# Patient Record
Sex: FEMALE | Race: BLACK OR AFRICAN AMERICAN | NOT HISPANIC OR LATINO | Employment: PART TIME | URBAN - METROPOLITAN AREA
[De-identification: names, ages, dates, MRNs, and addresses within clinical notes are randomized per-mention and may not be internally consistent; named-entity substitution may affect disease eponyms.]

---

## 2017-09-21 ENCOUNTER — APPOINTMENT (OUTPATIENT)
Dept: GENERAL RADIOLOGY | Facility: HOSPITAL | Age: 42
End: 2017-09-21

## 2017-09-21 ENCOUNTER — HOSPITAL ENCOUNTER (EMERGENCY)
Facility: HOSPITAL | Age: 42
Discharge: HOME | End: 2017-09-21
Attending: EMERGENCY MEDICINE | Admitting: EMERGENCY MEDICINE

## 2017-09-21 VITALS
OXYGEN SATURATION: 99 % | DIASTOLIC BLOOD PRESSURE: 87 MMHG | SYSTOLIC BLOOD PRESSURE: 113 MMHG | HEART RATE: 78 BPM | RESPIRATION RATE: 14 BRPM | HEIGHT: 63 IN | BODY MASS INDEX: 31.54 KG/M2 | TEMPERATURE: 98.3 F | WEIGHT: 178 LBS

## 2017-09-21 DIAGNOSIS — S80.212A KNEE ABRASION, LEFT, INITIAL ENCOUNTER: Primary | ICD-10-CM

## 2017-09-21 DIAGNOSIS — S83.512A SPRAIN OF ANTERIOR CRUCIATE LIGAMENT OF LEFT KNEE, INITIAL ENCOUNTER: ICD-10-CM

## 2017-09-21 PROCEDURE — 73560 X-RAY EXAM OF KNEE 1 OR 2: CPT

## 2017-09-21 PROCEDURE — 99283 EMERGENCY DEPT VISIT LOW MDM: CPT

## 2017-09-21 RX ORDER — HYDROCODONE BITARTRATE AND ACETAMINOPHEN 5; 325 MG/1; MG/1
1 TABLET ORAL EVERY 6 HOURS PRN
Qty: 15 TABLET | Refills: 0 | Status: SHIPPED | OUTPATIENT
Start: 2017-09-21 | End: 2017-12-13

## 2017-09-21 NOTE — DISCHARGE INSTRUCTIONS
Followup with orthopedic surgery within next week for further evaluation.     Wear knee immobilizer and use crutches as instructed.

## 2017-11-22 ENCOUNTER — HOSPITAL ENCOUNTER (EMERGENCY)
Facility: HOSPITAL | Age: 42
Discharge: HOME | End: 2017-11-22
Attending: EMERGENCY MEDICINE | Admitting: EMERGENCY MEDICINE

## 2017-11-22 VITALS
OXYGEN SATURATION: 98 % | WEIGHT: 178 LBS | SYSTOLIC BLOOD PRESSURE: 154 MMHG | TEMPERATURE: 98.5 F | BODY MASS INDEX: 30.39 KG/M2 | RESPIRATION RATE: 16 BRPM | HEIGHT: 64 IN | DIASTOLIC BLOOD PRESSURE: 67 MMHG | HEART RATE: 82 BPM

## 2017-11-22 DIAGNOSIS — M25.462 KNEE EFFUSION, LEFT: Primary | ICD-10-CM

## 2017-11-22 DIAGNOSIS — S83.92XA SPRAIN OF LEFT KNEE, UNSPECIFIED LIGAMENT, INITIAL ENCOUNTER: ICD-10-CM

## 2017-11-22 PROCEDURE — 99282 EMERGENCY DEPT VISIT SF MDM: CPT

## 2017-11-22 NOTE — ED PROVIDER NOTES
"Subjective   HPI Comments: Sully Yost is a 42 y.o.female who presents to the ED with c/o left knee pain. She reports she has developed severe left knee pain with swelling and numbness. She notes, 2 months ago, she came to [facility] for the same left knee pain and discharged with a knee brace. She states that she stopped wearing her knee brace and developed severe worsening pain, soon thereafter, which prompted her visit to the ED. She notes that her pain worsens with stretching and activity. There are no other complaints at this time.     Patient is a 42 y.o. female presenting with knee pain.   History provided by:  Patient  Knee Pain   Location:  Knee  Knee location:  L knee  Pain details:     Quality:  Aching    Radiates to:  Does not radiate    Severity:  Moderate    Onset quality:  Sudden    Timing:  Constant    Progression:  Worsening  Chronicity:  Recurrent  Dislocation: no    Foreign body present:  No foreign bodies  Tetanus status:  Unknown  Prior injury to area:  Yes  Relieved by:  None tried  Worsened by:  Activity  Ineffective treatments:  None tried  Associated symptoms: numbness and swelling        Review of Systems   Cardiovascular: Positive for leg swelling (Left knee swelling).   Musculoskeletal: Positive for arthralgias and myalgias.   Neurological: Positive for numbness.       Past Medical History:   Diagnosis Date   • Anxiety    • Asthma    • Migraine        Allergies   Allergen Reactions   • Penicillins      \"MY WHOLE FAMILY IS ALLERGIC AND I DONT WANT TO TAKE A CHANCE\"        Past Surgical History:   Procedure Laterality Date   • CYST REMOVAL         History reviewed. No pertinent family history.    Social History     Social History   • Marital status:      Spouse name: N/A   • Number of children: N/A   • Years of education: N/A     Social History Main Topics   • Smoking status: Current Every Day Smoker     Packs/day: 0.50     Types: Cigarettes   • Smokeless tobacco: Never Used   • " "Alcohol use Yes      Comment: OCCASIONALLY   • Drug use: No   • Sexual activity: Defer     Other Topics Concern   • None     Social History Narrative   • None         Objective   Physical Exam   Constitutional: She is oriented to person, place, and time. She appears well-developed and well-nourished. No distress.   HENT:   Head: Normocephalic and atraumatic.   Nose: Nose normal.   Eyes: Conjunctivae are normal. No scleral icterus.   Neck: Normal range of motion. Neck supple.   Cardiovascular: Normal rate, regular rhythm and normal heart sounds.    No murmur heard.  Pulmonary/Chest: Effort normal and breath sounds normal. No respiratory distress.   Musculoskeletal:   Medial and lateral TTP of left knee. Small to moderate effusion. No increased warmth or erythema throughout knee region.    Neurological: She is alert and oriented to person, place, and time.   Skin: Skin is warm and dry.   Psychiatric: She has a normal mood and affect. Her behavior is normal.   Nursing note and vitals reviewed.      Procedures         ED Course  ED Course     No results found for this or any previous visit (from the past 24 hour(s)).  Note: In addition to lab results from this visit, the labs listed above may include labs taken at another facility or during a different encounter within the last 24 hours. Please correlate lab times with ED admission and discharge times for further clarification of the services performed during this visit.    No orders to display     Vitals:    11/22/17 0951   BP: 154/67   BP Location: Left arm   Patient Position: Sitting   Pulse: 82   Resp: 16   Temp: 98.5 °F (36.9 °C)   TempSrc: Oral   SpO2: 98%   Weight: 178 lb (80.7 kg)   Height: 64\" (162.6 cm)     Medications - No data to display  ECG/EMG Results (last 24 hours)     ** No results found for the last 24 hours. **                          [facility]    Final diagnoses:   Knee effusion, left   Sprain of left knee, unspecified ligament, initial encounter "       Documentation assistance provided by jeff Osman.  Information recorded by the scribe was done at my direction and has been verified and validated by me.     Alexis Osman  11/22/17 5155       Bakari Aguilar MD  11/22/17 4460

## 2017-11-22 NOTE — DISCHARGE INSTRUCTIONS
Take 3 over-the-counter Ibuprofen tablets 3 times a day as needed for pain. Try to take the medication with food. If you develop upper abdominal discomfort, discontinue use.    Weight bearing as tolerated using the crutches you have at home.    Immediately return to the emergency department for any new or worsening symptoms.

## 2017-12-08 ENCOUNTER — HOSPITAL ENCOUNTER (EMERGENCY)
Facility: HOSPITAL | Age: 42
Discharge: HOME | End: 2017-12-08
Attending: EMERGENCY MEDICINE | Admitting: EMERGENCY MEDICINE

## 2017-12-08 VITALS
DIASTOLIC BLOOD PRESSURE: 78 MMHG | WEIGHT: 180 LBS | OXYGEN SATURATION: 100 % | HEART RATE: 83 BPM | TEMPERATURE: 98 F | HEIGHT: 65 IN | BODY MASS INDEX: 29.99 KG/M2 | SYSTOLIC BLOOD PRESSURE: 131 MMHG | RESPIRATION RATE: 16 BRPM

## 2017-12-08 DIAGNOSIS — M25.562 ACUTE PAIN OF LEFT KNEE: Primary | ICD-10-CM

## 2017-12-08 PROCEDURE — 99283 EMERGENCY DEPT VISIT LOW MDM: CPT

## 2017-12-08 RX ORDER — NAPROXEN 500 MG/1
500 TABLET ORAL 2 TIMES DAILY PRN
Qty: 10 TABLET | Refills: 0 | Status: SHIPPED | OUTPATIENT
Start: 2017-12-08 | End: 2017-12-13

## 2017-12-08 NOTE — ED PROVIDER NOTES
Subjective   HPI Comments: 42-year-old female reports pain in her left knee for the last 2 months.  She reports that she works as a  at a local hotel.  She reports she has been ER multiple times and received pain medication but she has ran out of those and it did not provide significant relief.  She has been referred to an orthopedic surgeon but has not followed up with the orthopedic surgeon.  She continues to bear weight on the left leg despite previous recommendation to the contrary, unfortunately this is necessary for her to do her daily activity.   Injuries.  No fever or systemic symptoms of infection.  No excessive swelling or redness to the left knee.    Patient is a 42 y.o. female presenting with knee pain.   Knee Pain   Pain details:     Quality:  Aching    Radiates to:  L leg    Severity:  Moderate    Onset quality:  Gradual    Duration:  2 months    Timing:  Constant    Progression:  Worsening  Chronicity:  Chronic  Dislocation: no    Foreign body present:  No foreign bodies  Tetanus status:  Unknown  Prior injury to area:  No  Relieved by:  Nothing  Worsened by:  Bearing weight, extension and flexion  Ineffective treatments:  Elevation, ice and rest  Associated symptoms: stiffness and swelling    Associated symptoms: no back pain, no fatigue, no fever, no muscle weakness, no neck pain and no numbness        Review of Systems   Constitutional: Negative for chills, fatigue and fever.   HENT: Negative for congestion, ear pain, postnasal drip, sinus pressure and sore throat.    Eyes: Negative for pain, redness and visual disturbance.   Respiratory: Negative for cough, chest tightness and shortness of breath.    Cardiovascular: Negative for chest pain, palpitations and leg swelling.   Gastrointestinal: Negative for abdominal pain, anal bleeding, blood in stool, diarrhea, nausea and vomiting.   Endocrine: Negative for polydipsia and polyuria.   Genitourinary: Negative for difficulty urinating,  "dysuria, frequency and urgency.   Musculoskeletal: Positive for arthralgias (left knee pain) and stiffness. Negative for back pain and neck pain.   Skin: Negative for pallor and rash.   Allergic/Immunologic: Negative for environmental allergies and immunocompromised state.   Neurological: Negative for dizziness, weakness and headaches.   Hematological: Negative for adenopathy.   Psychiatric/Behavioral: Negative for confusion, self-injury and suicidal ideas. The patient is not nervous/anxious.    All other systems reviewed and are negative.      Past Medical History:   Diagnosis Date   • Anxiety    • Asthma    • Migraine        Allergies   Allergen Reactions   • Penicillins      \"MY WHOLE FAMILY IS ALLERGIC AND I DONT WANT TO TAKE A CHANCE\"        Past Surgical History:   Procedure Laterality Date   • CYST REMOVAL         History reviewed. No pertinent family history.    Social History     Social History   • Marital status:      Spouse name: N/A   • Number of children: N/A   • Years of education: N/A     Social History Main Topics   • Smoking status: Current Every Day Smoker     Packs/day: 0.50     Types: Cigarettes   • Smokeless tobacco: Never Used   • Alcohol use Yes      Comment: OCCASIONALLY   • Drug use: No   • Sexual activity: Defer     Other Topics Concern   • None     Social History Narrative           Objective   Physical Exam   Constitutional: She is oriented to person, place, and time. She appears well-developed and well-nourished.  Non-toxic appearance. No distress.   HENT:   Head: Normocephalic and atraumatic.   Right Ear: External ear normal.   Left Ear: External ear normal.   Nose: Nose normal.   Eyes: EOM and lids are normal. Pupils are equal, round, and reactive to light.   Neck: Normal range of motion. Neck supple. No tracheal deviation present.   Cardiovascular: Normal rate, regular rhythm and normal heart sounds.  Exam reveals no gallop, no friction rub and no decreased pulses.    No " murmur heard.  Pulmonary/Chest: Effort normal and breath sounds normal. No respiratory distress. She has no decreased breath sounds. She has no wheezes. She has no rhonchi. She has no rales.   Abdominal: Soft. Normal appearance and bowel sounds are normal. There is no tenderness. There is no rebound and no guarding.   Musculoskeletal: She exhibits no deformity.        Left knee: She exhibits decreased range of motion (mild) and swelling (mild). She exhibits no deformity and no erythema. Tenderness found.        Legs:  Lymphadenopathy:     She has no cervical adenopathy.   Neurological: She is alert and oriented to person, place, and time. She has normal strength. No cranial nerve deficit or sensory deficit.   Skin: Skin is warm and dry. No rash noted. She is not diaphoretic.   Psychiatric: She has a normal mood and affect. Her speech is normal and behavior is normal. Judgment and thought content normal. Cognition and memory are normal.   Nursing note and vitals reviewed.      Procedures         ED Course  ED Course                  MDM  Number of Diagnoses or Management Options  Acute pain of left knee: new and requires workup  Diagnosis management comments: She will be advised to take naproxen twice daily.  Keep leg elevated and apply ice.    Use crutches to help aid in ambulation.    Keep follow-up with orthopedic surgeon, Dr. Lang.       Amount and/or Complexity of Data Reviewed  Review and summarize past medical records: yes  Independent visualization of images, tracings, or specimens: yes    Patient Progress  Patient progress: stable      Final diagnoses:   Acute pain of left knee            Marivel Singh MD  12/08/17 4308

## 2017-12-08 NOTE — DISCHARGE INSTRUCTIONS
Patient is advised to keep leg elevated and apply ice to help control pain.    Use crutches to help aid in ambulation.

## 2017-12-13 ENCOUNTER — OFFICE VISIT (OUTPATIENT)
Dept: ORTHOPEDIC SURGERY | Facility: CLINIC | Age: 42
End: 2017-12-13

## 2017-12-13 VITALS
DIASTOLIC BLOOD PRESSURE: 73 MMHG | HEIGHT: 65 IN | WEIGHT: 185.19 LBS | BODY MASS INDEX: 30.85 KG/M2 | HEART RATE: 70 BPM | SYSTOLIC BLOOD PRESSURE: 123 MMHG

## 2017-12-13 DIAGNOSIS — M25.562 CHRONIC PAIN OF LEFT KNEE: Primary | ICD-10-CM

## 2017-12-13 DIAGNOSIS — G89.29 CHRONIC PAIN OF LEFT KNEE: Primary | ICD-10-CM

## 2017-12-13 PROCEDURE — 99203 OFFICE O/P NEW LOW 30 MIN: CPT | Performed by: ORTHOPAEDIC SURGERY

## 2017-12-13 RX ORDER — NORETHINDRONE
KIT
Refills: 0 | COMMUNITY
Start: 2017-09-21 | End: 2019-04-15

## 2017-12-13 NOTE — PROGRESS NOTES
[facility] Orthopaedic Surgery Clinic Note    Subjective     Chief Complaint   Patient presents with   • Left Knee - Pain        HPI    Sully Yost is a 42 y.o. female. She presents today for evaluation of left knee pain.  The pain is been present now for 3-1/2 months, following no particular injury.  The pain is moderate to severe, associated with swelling, popping, grinding, stiffness and giving way.  It worsens with walking, standing and climbing stairs.  She currently works as a , and has and able to continue to work in spite of the pain and swelling.  The pain is aching in quality.  She has been seen in the emergency room a few times for this.      There is no problem list on file for this patient.    Past Medical History:   Diagnosis Date   • Anxiety    • Asthma    • Migraine       Past Surgical History:   Procedure Laterality Date   • CYST REMOVAL        Family History   Problem Relation Age of Onset   • Cancer Mother    • Diabetes Mother      Social History     Social History   • Marital status:      Spouse name: N/A   • Number of children: N/A   • Years of education: N/A     Occupational History   • Not on file.     Social History Main Topics   • Smoking status: Current Every Day Smoker     Packs/day: 0.50     Types: Cigarettes   • Smokeless tobacco: Never Used   • Alcohol use Yes      Comment: OCCASIONALLY   • Drug use: No   • Sexual activity: Defer     Other Topics Concern   • Not on file     Social History Narrative      Current Outpatient Prescriptions on File Prior to Visit   Medication Sig Dispense Refill   • [DISCONTINUED] HYDROcodone-acetaminophen (NORCO) 5-325 MG per tablet Take 1 tablet by mouth Every 6 (Six) Hours As Needed for Severe Pain  for up to 15 doses. 15 tablet 0   • [DISCONTINUED] naproxen (NAPROSYN) 500 MG tablet Take 1 tablet by mouth 2 (Two) Times a Day As Needed for Mild Pain  or Moderate Pain  for up to 5 days. 10 tablet 0     No current facility-administered  "medications on file prior to visit.       Allergies   Allergen Reactions   • Penicillins      \"MY WHOLE FAMILY IS ALLERGIC AND I DONT WANT TO TAKE A CHANCE\"         Review of Systems   Constitutional: Positive for appetite change.   Eyes: Negative.    Respiratory: Positive for apnea and shortness of breath.    Cardiovascular: Negative.    Gastrointestinal: Positive for constipation.   Endocrine: Positive for cold intolerance.   Genitourinary: Positive for menstrual problem.   Musculoskeletal: Positive for arthralgias.   Skin: Negative.    Allergic/Immunologic: Negative.    Neurological: Positive for headaches.   Hematological: Negative.    Psychiatric/Behavioral: Positive for sleep disturbance. The patient is nervous/anxious.         Objective      Physical Exam  /73  Pulse 70  Ht 165.1 cm (65\")  Wt 84 kg (185 lb 3 oz)  BMI 30.82 kg/m2    Body mass index is 30.82 kg/(m^2).    General:   Mental Status:  Alert   Appearance: Cooperative, in no acute distress   Build and Nutrition: Overweight female   Orientation: Alert and oriented to person, place and time   Posture: Normal   Gait: Antalgic    Integument:   Left knee: No skin lesions, no rash, no ecchymosis    Neurologic:   Sensation:    Left foot: Intact to light touch on the dorsal and plantar aspect   Motor:  Left lower extremity: 5/5 quadriceps, hamstrings, ankle dorsiflexors, and ankle plantar flexors  Vascular:   Left lower extremity: 2+ dorsalis pedis pulse, prompt capillary refill    Lower Extremities:   Left Knee:    Tenderness:  Medial and lateral joint line tenderness    Effusion:  2+    Swelling:  None    Crepitus:  Positive    Atrophy:  None    Range of motion:  Extension: 0°       Flexion: 100°  Instability:  No varus laxity, no valgus laxity, negative anterior drawer  Deformities:  None      Imaging/Studies  Reviewed her previous plain films, obtained here at [facility], which showed good alignment, with no obvious arthritic " changes.      Assessment and Plan     Sully was seen today for pain.    Diagnoses and all orders for this visit:    Chronic pain of left knee  -     MRI Knee Left Without Contrast; Future        I reviewed my findings with patient today.  I'm suspicious of a possible meniscal tear.  I do not believe that she has an ACL tear.  At this point, I recommended an MRI.  I will see her back after the MRI, but sooner for any problems.    Return for After Imaging Study.      Medical Decision Making    Data/Risk: independent visualization of imaging, lab tests, or EMG/NCV      Ceferino Lang MD  12/13/17  1:42 PM

## 2017-12-20 ENCOUNTER — HOSPITAL ENCOUNTER (OUTPATIENT)
Dept: MRI IMAGING | Facility: HOSPITAL | Age: 42
Discharge: HOME | End: 2017-12-20
Attending: ORTHOPAEDIC SURGERY | Admitting: ORTHOPAEDIC SURGERY

## 2017-12-20 DIAGNOSIS — G89.29 CHRONIC PAIN OF LEFT KNEE: ICD-10-CM

## 2017-12-20 DIAGNOSIS — M25.562 CHRONIC PAIN OF LEFT KNEE: ICD-10-CM

## 2017-12-20 PROCEDURE — 73721 MRI JNT OF LWR EXTRE W/O DYE: CPT

## 2017-12-22 ENCOUNTER — TELEPHONE (OUTPATIENT)
Dept: ORTHOPEDIC SURGERY | Facility: CLINIC | Age: 42
End: 2017-12-22

## 2017-12-22 NOTE — TELEPHONE ENCOUNTER
Called patient back, she was wanting to be seen earlier than 01/08/18 due to risk of losing job because of knee. I made her an appt with [facility] on 01/03/2018 since she is with [facility] on that day. I instructed patient to try to take it easy and rest the knee between now and then. She understood and will call back with further problems or questions.     Narcisa

## 2017-12-22 NOTE — TELEPHONE ENCOUNTER
PATIENT WOULD LIKE TO DISCUSS THE FLUID ON HER KNEE. HAD A MRI DONE AND APPOINTMENT TO COME BACK IS NOT UNTIL 1/8 BUT PATIENT IS HAVING INCREASED DIFFICULTIES.

## 2018-01-03 ENCOUNTER — OFFICE VISIT (OUTPATIENT)
Dept: ORTHOPEDIC SURGERY | Facility: CLINIC | Age: 43
End: 2018-01-03

## 2018-01-03 VITALS
BODY MASS INDEX: 30.49 KG/M2 | HEIGHT: 65 IN | WEIGHT: 182.98 LBS | SYSTOLIC BLOOD PRESSURE: 135 MMHG | DIASTOLIC BLOOD PRESSURE: 66 MMHG | HEART RATE: 82 BPM

## 2018-01-03 DIAGNOSIS — M17.11 PRIMARY OSTEOARTHRITIS OF RIGHT KNEE: Primary | ICD-10-CM

## 2018-01-03 PROCEDURE — 20610 DRAIN/INJ JOINT/BURSA W/O US: CPT | Performed by: PHYSICIAN ASSISTANT

## 2018-01-03 PROCEDURE — 99214 OFFICE O/P EST MOD 30 MIN: CPT | Performed by: PHYSICIAN ASSISTANT

## 2018-01-03 RX ORDER — LIDOCAINE HYDROCHLORIDE 10 MG/ML
4 INJECTION, SOLUTION INFILTRATION; PERINEURAL
Status: COMPLETED | OUTPATIENT
Start: 2018-01-03 | End: 2018-01-03

## 2018-01-03 RX ORDER — METHYLPREDNISOLONE ACETATE 40 MG/ML
40 INJECTION, SUSPENSION INTRA-ARTICULAR; INTRALESIONAL; INTRAMUSCULAR; SOFT TISSUE
Status: COMPLETED | OUTPATIENT
Start: 2018-01-03 | End: 2018-01-03

## 2018-01-03 RX ADMIN — LIDOCAINE HYDROCHLORIDE 4 ML: 10 INJECTION, SOLUTION INFILTRATION; PERINEURAL at 15:26

## 2018-01-03 RX ADMIN — METHYLPREDNISOLONE ACETATE 40 MG: 40 INJECTION, SUSPENSION INTRA-ARTICULAR; INTRALESIONAL; INTRAMUSCULAR; SOFT TISSUE at 15:26

## 2018-01-03 NOTE — PROGRESS NOTES
Procedure   Large Joint Arthrocentesis  Date/Time: 1/3/2018 3:26 PM  Consent given by: patient  Site marked: site marked  Timeout: Immediately prior to procedure a time out was called to verify the correct patient, procedure, equipment, support staff and site/side marked as required   Supporting Documentation  Indications: pain   Procedure Details  Location: knee - L knee  Preparation: Patient was prepped and draped in the usual sterile fashion  Needle size: 25 G  Approach: anterolateral  Medications administered: 4 mL lidocaine 1 %; 40 mg methylPREDNISolone acetate 40 MG/ML (Bupicavaine 0.25%, NDC: 55150-167-10, LOT: [id number], EXP: 05/2019, [facility])  Aspirate amount: 13 mL  Aspirate: yellow  Patient tolerance: patient tolerated the procedure well with no immediate complications

## 2018-01-03 NOTE — PROGRESS NOTES
"        [facility] Orthopaedic Surgery Clinic Note    Subjective     Patient: Sully Yost  : 1975    Primary Care Provider: No Known Provider    Requesting Provider: As above    Follow-up of the Left Knee      History    Chief Complaint: Follow-up left knee MRI    History of Present Illness: Patient returns today for follow-up of her left knee pain and MRI 17.  Patient reports no new symptoms in the left knee.  She complains of persisting left medial and lateral pain with weightbearing and walking, worse with stairs.  She denies any radiating pain or mechanical symptoms.  She does complain of some popping in the anterior knee.  She reports the knee has been swelling on and off.  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  She is ready to get back to work and would like to return to work with no restrictions.  She is not had any previous injection, physical therapy or anti-inflammatories.  No injury.    Current Outpatient Prescriptions on File Prior to Visit   Medication Sig Dispense Refill   • NORLYDA 0.35 MG tablet TK 1 T PO QD  0     No current facility-administered medications on file prior to visit.       Allergies   Allergen Reactions   • Penicillins      \"MY WHOLE FAMILY IS ALLERGIC AND I DONT WANT TO TAKE A CHANCE\"       Past Medical History:   Diagnosis Date   • Anxiety    • Asthma    • Migraine      Past Surgical History:   Procedure Laterality Date   • CYST REMOVAL       Family History   Problem Relation Age of Onset   • Cancer Mother    • Diabetes Mother       Social History     Social History   • Marital status:      Spouse name: N/A   • Number of children: N/A   • Years of education: N/A     Occupational History   • Not on file.     Social History Main Topics   • Smoking status: Current Every Day Smoker     Packs/day: 0.50     Types: Cigarettes   • Smokeless tobacco: Never Used   • Alcohol use Yes      Comment: OCCASIONALLY   • Drug " "use: No   • Sexual activity: Defer     Other Topics Concern   • Not on file     Social History Narrative        Review of Systems   Constitutional: Negative.    HENT: Negative.    Eyes: Negative.    Respiratory: Negative.    Cardiovascular: Negative.    Gastrointestinal: Negative.    Endocrine: Negative.    Genitourinary: Negative.    Musculoskeletal: Positive for arthralgias.   Skin: Negative.    Allergic/Immunologic: Negative.    Neurological: Negative.    Hematological: Negative.    Psychiatric/Behavioral: Negative.        The following portions of the patient's history were reviewed and updated as appropriate: allergies, current medications, past family history, past medical history, past social history, past surgical history and problem list.      Objective      Physical Exam  /66  Pulse 82  Ht 165.1 cm (65\")  Wt 83 kg (182 lb 15.7 oz)  BMI 30.45 kg/m2    Body mass index is 30.45 kg/(m^2).    GENERAL: Body habitus: obese    Lower extremity edema: Left: none; Right: none    Varicose veins:  Left: none; Right: none    Gait: antalgic     Mental Status:  awake and alert; oriented to person, place, and time    Voice:  clear  SKIN:  Normal    Hair Growth:  Right:normal; Left:  normal  HEENT: Head: Normocephalic, atraumatic,  without obvious abnormality.  eye: normal external eye, no icterus   PULM:  Repiratory effort normal    Ortho Exam  Left Knee Exam  ----------  Knee Exam:  ----------  ALIGNMENT:  Left: neutral  ----------  RANGE OF MOTION:  Left: Normal (0-130 degrees) with no extensor lag or flexion contracture  LIGAMENTOUS STABILITY:   Left:stable to varus and valgus stress at terminal extension and 30 degrees without any evidence of laxity   ----------  STRENGTH:  KNEE FLEXION  Left 5/5  KNEE EXTENSION Left 5/5  ----------  PAIN WITH PALPATION: Left medial joint line and anterior knee  PAIN WITH KNEE ROM:  Left no  PATELLAR CREPITUS:  Left yes  ----------  SENSATION TO LIGHT TOUCH:  DEEP " PERONEAL/SUPERFICIAL PERONEAL/SURAL/SAPHENOUS/TIBIAL:   Left intact  ----------  EDEMA:   Left:  no  ERYTHEMA:  ; Left:  no  WOUNDS/INCISIONS: none, no overlying skin problems.      Medical Decision Making    Data Review:   reviewed radiology images  MRI 12/20/17 left knee shows bone edema in medial femoral condyle, medial tibial plateau with cartilage loss int he medial compartment.  Patella chondromalacia, intact medial and lateral menisci. intact ACL, PCL    Assessment:  1. Primary osteoarthritis of right knee        Plan:  Right knee arthritis.  I reviewed MRI findings, clinical findings past and current treatment with the patient.  On exam, she has mainly anterior medial joint line pain with some patellofemoral joint line pain and no evidence of ligament or meniscal pathology.  I explained that the MRI shows changes consistent with arthritis with loss of cartilage and bone edema indicating overload.  I reassured her that there is no evidence of meniscus tear or ligament pathology on MRI.  We discussed treatment options including good shoe wear, icing the knee, anti-inflammatories, aspiration and steroid injection.  I also discussed the possibility of Visco supplementation in the future  I discussed with her that long term, she may likely need knee replacement.  Mentation today is aspiration followed by steroid injection of the right knee.  I will give her a note to return to work and encouraged her to wear good shoes while working.  She will return in 6 weeks to see how she has progressed and possibly consider physical therapy and/or visco.      Georgette Erwin PA-C  01/03/18  4:13 PM

## 2018-02-21 ENCOUNTER — OFFICE VISIT (OUTPATIENT)
Dept: ORTHOPEDIC SURGERY | Facility: CLINIC | Age: 43
End: 2018-02-21

## 2018-02-21 DIAGNOSIS — M17.12 PRIMARY OSTEOARTHRITIS OF LEFT KNEE: Primary | ICD-10-CM

## 2018-02-21 PROBLEM — M17.11 PRIMARY OSTEOARTHRITIS OF RIGHT KNEE: Status: RESOLVED | Noted: 2018-01-03 | Resolved: 2018-02-21

## 2018-02-21 PROCEDURE — 99212 OFFICE O/P EST SF 10 MIN: CPT | Performed by: PHYSICIAN ASSISTANT

## 2018-02-21 NOTE — PROGRESS NOTES
"        [facility]    Subjective     Patient: Sully Yost  : 1975    Primary Care Provider: No Known Provider    Requesting Provider: As above    Follow-up of the Left Knee (6 week f/u/)      History    Chief Complaint: Follow-up left knee pain    History of Present Illness: Patient returns today for routine follow-up of her left knee pain.  She has known left knee arthritis and aspiration and injection of steroid proximal to 6 weeks ago.  Patient reports that the injection gave her 100% improved pain until about a week ago.  The pain has slowly returned.  It is the same pain with no new symptoms.  She is not taking any anti-inflammatories.  She continues to work through the pain.  She has read up about Euflexxa and like to try a series of Visco she is a good candidate.  She was hoping that she could have repeat steroid injection today.    Current Outpatient Prescriptions on File Prior to Visit   Medication Sig Dispense Refill   • NORLYDA 0.35 MG tablet TK 1 T PO QD  0     No current facility-administered medications on file prior to visit.       Allergies   Allergen Reactions   • Penicillins      \"MY WHOLE FAMILY IS ALLERGIC AND I DONT WANT TO TAKE A CHANCE\"       Past Medical History:   Diagnosis Date   • Anxiety    • Asthma    • Migraine      Past Surgical History:   Procedure Laterality Date   • CYST REMOVAL       Family History   Problem Relation Age of Onset   • Cancer Mother    • Diabetes Mother    • No Known Problems Father       Social History     Social History   • Marital status:      Spouse name: N/A   • Number of children: N/A   • Years of education: N/A     Occupational History   • Not on file.     Social History Main Topics   • Smoking status: Current Every Day Smoker     Packs/day: 0.50     Types: Cigarettes   • Smokeless tobacco: Never Used   • Alcohol use Yes      Comment: OCCASIONALLY   • Drug use: No   • Sexual activity: Defer     Other Topics " Concern   • Not on file     Social History Narrative        Review of Systems    The following portions of the patient's history were reviewed and updated as appropriate: allergies, current medications, past family history, past medical history, past social history, past surgical history and problem list.      Objective      Physical Exam  There were no vitals taken for this visit.    There is no height or weight on file to calculate BMI.      Ortho Exam    Left Knee Exam  ----------  Knee Exam:  ----------  ALIGNMENT:  Left: neutral  ----------  RANGE OF MOTION:  Left: Normal (0-130 degrees) with no extensor lag or flexion contracture  LIGAMENTOUS STABILITY:   Left:stable to varus and valgus stress at terminal extension and 30 degrees without any evidence of laxity   ----------  STRENGTH:  KNEE FLEXION  Left 5/5  KNEE EXTENSION Left 5/5  ----------  PAIN WITH PALPATION: Left medial joint line  PAIN WITH KNEE ROM:  Left no  PATELLAR CREPITUS:  Left yes  ----------  SENSATION TO LIGHT TOUCH:  DEEP PERONEAL/SUPERFICIAL PERONEAL/SURAL/SAPHENOUS/TIBIAL:   Left intact  ----------  EDEMA:   Left:  no  ERYTHEMA:  ; Left:  no  WOUNDS/INCISIONS: none, no overlying skin problems.      Medical Decision Making    Data Review:   ordered and reviewed x-rays today    Assessment:  1. Primary osteoarthritis of left knee        Plan:  Left knee arthritis.  I reviewed clinical findings past and current treatment with the patient.  Patient reports steroid injection 6 weeks ago gave her excellent relief for about 5 weeks.  She is now having returning symptoms, the same as before.  We discussed possible Visco supplementation in the past.  She has read the literature about it and I explained to her that the goal is to give her longer term relief than the steroid injection.  I also discussed with her that we can only do steroid injection every 4 months and it is too early to do repeat injection today.  Patient would like to try a series of  Visco.  I will get the injections preauthorized for the left knee.  She will return to begin the series once it is approved or sooner if needed.    Georgette Erwin PA-C  02/21/18  3:04 PM

## 2018-03-08 ENCOUNTER — CLINICAL SUPPORT (OUTPATIENT)
Dept: ORTHOPEDIC SURGERY | Facility: CLINIC | Age: 43
End: 2018-03-08

## 2018-03-08 DIAGNOSIS — M17.12 PRIMARY OSTEOARTHRITIS OF LEFT KNEE: Primary | ICD-10-CM

## 2018-03-08 PROCEDURE — 20610 DRAIN/INJ JOINT/BURSA W/O US: CPT | Performed by: PHYSICIAN ASSISTANT

## 2018-03-08 RX ORDER — HYALURONATE SODIUM 10 MG/ML
20 SYRINGE (ML) INTRAARTICULAR
Status: COMPLETED | OUTPATIENT
Start: 2018-03-08 | End: 2018-03-08

## 2018-03-08 RX ADMIN — Medication 20 MG: at 10:32

## 2018-03-08 NOTE — PROGRESS NOTES
"Subjective     Injections (Left knee Euflexxa Injection #1)      Sully Yost is a 43 y.o. female.     History of Present Illness   Patient presents for the first injection of Euflexxa in the left knee.  She has never had flex in the past.  She reports no new symptoms in the left knee.  Allergies   Allergen Reactions   • Penicillins      \"MY WHOLE FAMILY IS ALLERGIC AND I DONT WANT TO TAKE A CHANCE\"      Current Outpatient Prescriptions on File Prior to Visit   Medication Sig Dispense Refill   • diclofenac (VOLTAREN) 50 MG EC tablet Take 1 tablet by mouth 2 (Two) Times a Day As Needed (knee pain). 60 tablet 1   • NORLYDA 0.35 MG tablet TK 1 T PO QD  0     No current facility-administered medications on file prior to visit.      Social History     Social History   • Marital status:      Spouse name: N/A   • Number of children: N/A   • Years of education: N/A     Occupational History   • Not on file.     Social History Main Topics   • Smoking status: Current Every Day Smoker     Packs/day: 0.50     Types: Cigarettes   • Smokeless tobacco: Never Used   • Alcohol use Yes      Comment: OCCASIONALLY   • Drug use: No   • Sexual activity: Defer     Other Topics Concern   • Not on file     Social History Narrative     Past Surgical History:   Procedure Laterality Date   • CYST REMOVAL       Family History   Problem Relation Age of Onset   • Cancer Mother    • Diabetes Mother    • No Known Problems Father      Past Medical History:   Diagnosis Date   • Anxiety    • Asthma    • Migraine          Review of Systems    The following portions of the patient's history were reviewed and updated as appropriate: allergies, current medications, past family history, past medical history, past social history, past surgical history and problem list.    Ortho Exam      Medical Decision Making    Assessment and Plan/ Diagnosis/Treatment options:   Left knee arthritis here to begin a series of Euflexxa.  Plan is begin a series a " left knee today.  She'll return in 1 week for the second injection or sooner if needed.    Using sterile technique, the left knee was sterilely prepped with Hibiclens.  Following time out, using a 22 gauge needle the left knee was aspirated and then injected with 2 ml Euflexxa. Approximately 0.5 mm of straw-colored fluid was obtained.  Patient tolerated the procedure well.  No complications.

## 2018-03-08 NOTE — PROGRESS NOTES
Procedure   Large Joint Arthrocentesis  Date/Time: 3/8/2018 10:32 AM  Consent given by: patient  Site marked: site marked  Timeout: Immediately prior to procedure a time out was called to verify the correct patient, procedure, equipment, support staff and site/side marked as required   Supporting Documentation  Indications: pain   Procedure Details  Location: knee - L knee  Preparation: Patient was prepped and draped in the usual sterile fashion  Needle size: 22 G  Approach: superior  Medications administered: 20 mg Sodium Hyaluronate 20 MG/2ML  Aspirate: clear (to verify intraarticular placement of the needle)  Patient tolerance: patient tolerated the procedure well with no immediate complications

## 2018-03-15 ENCOUNTER — OFFICE VISIT (OUTPATIENT)
Dept: ORTHOPEDIC SURGERY | Facility: CLINIC | Age: 43
End: 2018-03-15

## 2018-03-15 DIAGNOSIS — M17.12 PRIMARY OSTEOARTHRITIS OF LEFT KNEE: Primary | ICD-10-CM

## 2018-03-15 PROCEDURE — 20610 DRAIN/INJ JOINT/BURSA W/O US: CPT | Performed by: PHYSICIAN ASSISTANT

## 2018-03-15 RX ORDER — HYALURONATE SODIUM 10 MG/ML
20 SYRINGE (ML) INTRAARTICULAR
Status: COMPLETED | OUTPATIENT
Start: 2018-03-15 | End: 2018-03-15

## 2018-03-15 RX ADMIN — Medication 20 MG: at 13:27

## 2018-03-15 NOTE — PROGRESS NOTES
"Subjective     Follow-up of the Left Knee (1 week follow up: Euflexxa Injection #2)      Sully Yost is a 43 y.o. female.     History of Present Illness   Patient comes in for the second injection of Euflexxa in her left knee.  She is tolerated previous injections well.  She reports no change in her knee pain since beginning the series.  Allergies   Allergen Reactions   • Penicillins      \"MY WHOLE FAMILY IS ALLERGIC AND I DONT WANT TO TAKE A CHANCE\"      Current Outpatient Prescriptions on File Prior to Visit   Medication Sig Dispense Refill   • diclofenac (VOLTAREN) 50 MG EC tablet Take 1 tablet by mouth 2 (Two) Times a Day As Needed (knee pain). 60 tablet 1   • NORLYDA 0.35 MG tablet TK 1 T PO QD  0     No current facility-administered medications on file prior to visit.      Social History     Social History   • Marital status:      Spouse name: N/A   • Number of children: N/A   • Years of education: N/A     Occupational History   • Not on file.     Social History Main Topics   • Smoking status: Current Every Day Smoker     Packs/day: 0.50     Types: Cigarettes   • Smokeless tobacco: Never Used   • Alcohol use Yes      Comment: OCCASIONALLY   • Drug use: No   • Sexual activity: Defer     Other Topics Concern   • Not on file     Social History Narrative   • No narrative on file     Past Surgical History:   Procedure Laterality Date   • CYST REMOVAL       Family History   Problem Relation Age of Onset   • Cancer Mother    • Diabetes Mother    • No Known Problems Father      Past Medical History:   Diagnosis Date   • Anxiety    • Asthma    • Migraine          Review of Systems    The following portions of the patient's history were reviewed and updated as appropriate: allergies, current medications, past family history, past medical history, past social history, past surgical history and problem list.    Ortho Exam      Medical Decision Making    Assessment and Plan/ Diagnosis/Treatment options: "   Left knee arthritis.  Plan is to proceed with a second injection left knee.  She'll return in 1 week for the final injection or sooner if needed.    Using sterile technique, the left knee was sterilely prepped with Hibiclens.  Following time out, using a 22 gauge needle the left knee was aspirated and then injected with 2 ml Euflexxa. Approximately 0.5 mm of straw-colored fluid was obtained.  Patient tolerated the procedure well.  No complications.

## 2018-03-15 NOTE — PROGRESS NOTES
Procedure   Large Joint Arthrocentesis  Date/Time: 3/15/2018 1:27 PM  Consent given by: patient  Site marked: site marked  Timeout: Immediately prior to procedure a time out was called to verify the correct patient, procedure, equipment, support staff and site/side marked as required   Supporting Documentation  Indications: pain   Procedure Details  Location: knee - L knee  Needle size: 22 G  Medications administered: 20 mg Sodium Hyaluronate 20 MG/2ML

## 2018-03-22 ENCOUNTER — OFFICE VISIT (OUTPATIENT)
Dept: ORTHOPEDIC SURGERY | Facility: CLINIC | Age: 43
End: 2018-03-22

## 2018-03-22 DIAGNOSIS — M17.12 PRIMARY OSTEOARTHRITIS OF LEFT KNEE: Primary | ICD-10-CM

## 2018-03-22 PROCEDURE — 20610 DRAIN/INJ JOINT/BURSA W/O US: CPT | Performed by: PHYSICIAN ASSISTANT

## 2018-03-22 RX ORDER — HYALURONATE SODIUM 10 MG/ML
20 SYRINGE (ML) INTRAARTICULAR
Status: COMPLETED | OUTPATIENT
Start: 2018-03-22 | End: 2018-03-22

## 2018-03-22 RX ADMIN — Medication 20 MG: at 14:17

## 2018-03-22 NOTE — PROGRESS NOTES
"Subjective     No chief complaint on file.      Sully Yost is a 43 y.o. female.     History of Present Illness   Patient returns for the final injection of Euflexxa in her left knee.  She reports no change in her knee pain.  She is tolerated previous injections well.  Allergies   Allergen Reactions   • Penicillins      \"MY WHOLE FAMILY IS ALLERGIC AND I DONT WANT TO TAKE A CHANCE\"      Current Outpatient Prescriptions on File Prior to Visit   Medication Sig Dispense Refill   • diclofenac (VOLTAREN) 50 MG EC tablet Take 1 tablet by mouth 2 (Two) Times a Day As Needed (knee pain). 60 tablet 1   • NORLYDA 0.35 MG tablet TK 1 T PO QD  0     No current facility-administered medications on file prior to visit.      Social History     Social History   • Marital status:      Spouse name: N/A   • Number of children: N/A   • Years of education: N/A     Occupational History   • Not on file.     Social History Main Topics   • Smoking status: Current Every Day Smoker     Packs/day: 0.50     Types: Cigarettes   • Smokeless tobacco: Never Used   • Alcohol use Yes      Comment: OCCASIONALLY   • Drug use: No   • Sexual activity: Defer     Other Topics Concern   • Not on file     Social History Narrative   • No narrative on file     Past Surgical History:   Procedure Laterality Date   • CYST REMOVAL       Family History   Problem Relation Age of Onset   • Cancer Mother    • Diabetes Mother    • No Known Problems Father      Past Medical History:   Diagnosis Date   • Anxiety    • Asthma    • Migraine          Review of Systems   Constitutional: Negative.    HENT: Negative.    Eyes: Negative.    Respiratory: Negative.    Cardiovascular: Negative.    Gastrointestinal: Negative.    Endocrine: Negative.    Genitourinary: Negative.    Musculoskeletal: Positive for arthralgias.   Skin: Negative.    Allergic/Immunologic: Negative.    Neurological: Negative.    Hematological: Negative.    Psychiatric/Behavioral: Negative.  "       The following portions of the patient's history were reviewed and updated as appropriate: allergies, current medications, past family history, past medical history, past social history, past surgical history and problem list.    Ortho Exam      Medical Decision Making    Assessment and Plan/ Diagnosis/Treatment options:   Left knee arthritis undergoing Euflexxa series.  Plan is to finish series in the left knee today.  I read minded her that it can take 6 weeks following the last injection to get optimal relief.  She'll return in 6-8 weeks to see how she has progressed or sooner if needed.    Using sterile technique, the left knee was sterilely prepped with Hibiclens.  Following time out, using a 22 gauge needle the left knee was aspirated and then injected with 2 ml Euflexxa. Approximately 0.5 mm of straw-colored fluid was obtained.  Patient tolerated the procedure well.  No complications.

## 2018-03-22 NOTE — PROGRESS NOTES
Procedure   Large Joint Arthrocentesis  Date/Time: 3/22/2018 2:17 PM  Consent given by: patient  Site marked: site marked  Timeout: Immediately prior to procedure a time out was called to verify the correct patient, procedure, equipment, support staff and site/side marked as required   Supporting Documentation  Indications: pain   Procedure Details  Location: knee - L knee  Needle size: 22 G  Approach: anterolateral  Medications administered: 20 mg Sodium Hyaluronate 20 MG/2ML  Patient tolerance: patient tolerated the procedure well with no immediate complications

## 2018-06-29 ENCOUNTER — LAB (OUTPATIENT)
Dept: LAB | Facility: HOSPITAL | Age: 43
End: 2018-06-29

## 2018-06-29 ENCOUNTER — OFFICE VISIT (OUTPATIENT)
Dept: ORTHOPEDIC SURGERY | Facility: CLINIC | Age: 43
End: 2018-06-29

## 2018-06-29 DIAGNOSIS — M25.462 EFFUSION OF LEFT KNEE: ICD-10-CM

## 2018-06-29 DIAGNOSIS — M17.12 PRIMARY OSTEOARTHRITIS OF LEFT KNEE: ICD-10-CM

## 2018-06-29 DIAGNOSIS — G89.29 CHRONIC PAIN OF LEFT KNEE: Primary | ICD-10-CM

## 2018-06-29 DIAGNOSIS — M25.562 CHRONIC PAIN OF LEFT KNEE: Primary | ICD-10-CM

## 2018-06-29 LAB
APPEARANCE FLD: ABNORMAL
COLOR FLD: YELLOW
CRYSTALS FLD MICRO: NORMAL
LYMPHOCYTES NFR FLD MANUAL: 30 %
MONOCYTES NFR FLD: 40 %
NEUTROPHILS NFR FLD MANUAL: 30 %
RBC # FLD AUTO: <1000 /MM3
WBC # FLD: 393 /MM3

## 2018-06-29 PROCEDURE — 89060 EXAM SYNOVIAL FLUID CRYSTALS: CPT

## 2018-06-29 PROCEDURE — 87015 SPECIMEN INFECT AGNT CONCNTJ: CPT

## 2018-06-29 PROCEDURE — 87070 CULTURE OTHR SPECIMN AEROBIC: CPT

## 2018-06-29 PROCEDURE — 99213 OFFICE O/P EST LOW 20 MIN: CPT | Performed by: PHYSICIAN ASSISTANT

## 2018-06-29 PROCEDURE — 87205 SMEAR GRAM STAIN: CPT

## 2018-06-29 PROCEDURE — 89051 BODY FLUID CELL COUNT: CPT | Performed by: PHYSICIAN ASSISTANT

## 2018-06-29 PROCEDURE — 20610 DRAIN/INJ JOINT/BURSA W/O US: CPT | Performed by: PHYSICIAN ASSISTANT

## 2018-06-29 RX ORDER — TRIAMCINOLONE ACETONIDE 40 MG/ML
40 INJECTION, SUSPENSION INTRA-ARTICULAR; INTRAMUSCULAR
Status: COMPLETED | OUTPATIENT
Start: 2018-06-29 | End: 2018-06-29

## 2018-06-29 RX ORDER — LIDOCAINE HYDROCHLORIDE 10 MG/ML
4 INJECTION, SOLUTION INFILTRATION; PERINEURAL
Status: COMPLETED | OUTPATIENT
Start: 2018-06-29 | End: 2018-06-29

## 2018-06-29 RX ORDER — MELOXICAM 7.5 MG/1
TABLET ORAL
Qty: 90 TABLET | Refills: 0 | Status: SHIPPED | OUTPATIENT
Start: 2018-06-29 | End: 2019-04-15

## 2018-06-29 RX ADMIN — LIDOCAINE HYDROCHLORIDE 4 ML: 10 INJECTION, SOLUTION INFILTRATION; PERINEURAL at 09:22

## 2018-06-29 RX ADMIN — TRIAMCINOLONE ACETONIDE 40 MG: 40 INJECTION, SUSPENSION INTRA-ARTICULAR; INTRAMUSCULAR at 09:22

## 2018-06-29 NOTE — PROGRESS NOTES
Procedure   Large Joint Arthrocentesis  Date/Time: 6/29/2018 9:22 AM  Consent given by: patient  Site marked: site marked  Timeout: Immediately prior to procedure a time out was called to verify the correct patient, procedure, equipment, support staff and site/side marked as required   Supporting Documentation  Indications: pain   Procedure Details  Location: knee - L knee  Preparation: Patient was prepped and draped in the usual sterile fashion  Needle size: 18 G  Approach: anterolateral  Medications administered: 4 mL lidocaine 1 %; 40 mg triamcinolone acetonide 40 MG/ML  Aspirate amount: 25 mL  Aspirate: clear and yellow  Patient tolerance: patient tolerated the procedure well with no immediate complications

## 2018-06-29 NOTE — PROGRESS NOTES
[facility]    Subjective     CC: Follow-up of the Left Knee (3 months)      HPI    Sully Yost is a 43 y.o. female. Patient presents to orthopedic clinic for follow-up of her left knee pain.  She has a known history of osteoarthritis.  She received a aspiration and corticosteroid injection in January 2018 which provided approximately 2 months of pain relief.  She then underwent Visco supplementation injections which worked until 2 weeks after the last injection was given.  Her complaint at this time is returning pain especially with prolonged standing, walking or going up and downstairs and effusion/swelling that's causing increased pain and limited range of motion.  She's no history of gout or rheumatoid arthritis.  No reported fever, chills, night sweats or other constitutional symptoms.     Current pain scale maximum 10/10 especially when walking or when up and down stairs.  Severity the pain moderate to severe.  Quality pain sharp.  Associated symptoms swelling and giving away.  Worsens with walking, standing, sitting, stair climbing.      ROS:    Constiutional:Pt denies fever, chills, nausea, or vomiting.  MSK:as above    Objective      Past Medical History  Past Medical History:   Diagnosis Date   • Anxiety    • Asthma    • Migraine          Physical Exam  There were no vitals taken for this visit.    There is no height or weight on file to calculate BMI.    Patient is well nourished and well developed.        Ortho Exam  Integument:              Left knee: No skin lesions, no rash, no ecchymosis.  No redness, warmth.  No evidence of knee infection.     Neurologic:              Sensation:                          Left foot: Intact to light touch on the dorsal and plantar aspect              Motor:  Left lower extremity: 5/5 quadriceps, hamstrings, ankle dorsiflexors, and ankle plantar flexors  Vascular:              Left lower extremity: 2+ dorsalis pedis pulse, prompt  capillary refill     Lower Extremities:              Left Knee:                          Tenderness:    Diffuse throughout entire knee to include medial and lateral joint line tenderness                          Effusion:          2+                          Swelling:          None                          Crepitus:          Positive                          Atrophy:           None                          Range of motion:        Extension:       0°                                                              Flexion:           100° to 110° with overpressure  Instability:         No varus laxity, no valgus laxity, negative anterior drawer    Deformities:     None    Imaging/Labs/EMG Reviewed:  Imaging Results (last 24 hours)     ** No results found for the last 24 hours. **      No new imaging today.    Assessment:  1. Chronic pain of left knee    2. Primary osteoarthritis of left knee    3. Effusion of left knee        Plan:  1. Discussion was had with patient regarding exam, assessment and treatment options.  2. After discussing risks and benefits we will proceed on today with joint aspiration followed by corticosteroid injection into the left knee.  3. Fluid will be sent for evaluation to include Gram stain, cell count, crystal.    4. Patient states she has tried anti-inflammatories so today I will prescribe Mobic.  5. Ordered physical therapy to help assist with inflammation and pain control as well as range of motion, stretching and strengthening.  6. Discussed the importance of activity modification and low nonimpact activities.  7. Follow-up in 6 weeks for repeat evaluation, sooner if issues arise.  8. Questions and concerns answered.      After discussing the risks of aspiration and injection, the patient gave consent to proceed.  Her left knee was confirmed as the correct joint to be aspirated and injected with a timeout.  It was then prepped using Betadine and under sterile conditions 25 cc of clear  straw-colored fluid was aspirated.  Then corticosteroid injection was given intra-articularly with a mixture of 4 cc of 1% plain lidocaine and 1 cc of Kenalog (40 mg per mL) without any resistance through the lateral approach, patient in supine position.  Area was cleaned, hemostasis was achieved and a Band-Aid was applied over the injection site.  The patient tolerated procedure well.  I instructed the patient on signs and symptoms of infection.  They should report to the emergency department or return to clinic if any of these develop, for further evaluation and treatment.  Recommended modifying activity for the next 48 hours to include rest, ice, elevation and oral pain medication as needed.  Patient was observed ambulating normally after the aspiration and injection.      Medical Decision Making  Management Options : prescription/IM medicine and physical/occupational therapy, injection      Mindi Jordan PA-C  06/29/18  10:26 AM

## 2018-07-13 LAB
BACTERIA FLD CULT: NORMAL
GRAM STN SPEC: NORMAL
GRAM STN SPEC: NORMAL

## 2019-04-15 ENCOUNTER — HOSPITAL ENCOUNTER (EMERGENCY)
Facility: HOSPITAL | Age: 44
Discharge: HOME | End: 2019-04-15
Attending: EMERGENCY MEDICINE | Admitting: EMERGENCY MEDICINE

## 2019-04-15 ENCOUNTER — APPOINTMENT (OUTPATIENT)
Dept: GENERAL RADIOLOGY | Facility: HOSPITAL | Age: 44
End: 2019-04-15

## 2019-04-15 VITALS
OXYGEN SATURATION: 98 % | WEIGHT: 180 LBS | SYSTOLIC BLOOD PRESSURE: 120 MMHG | HEIGHT: 64 IN | HEART RATE: 70 BPM | DIASTOLIC BLOOD PRESSURE: 70 MMHG | RESPIRATION RATE: 16 BRPM | TEMPERATURE: 97.8 F | BODY MASS INDEX: 30.73 KG/M2

## 2019-04-15 DIAGNOSIS — M50.30 DDD (DEGENERATIVE DISC DISEASE), CERVICAL: ICD-10-CM

## 2019-04-15 DIAGNOSIS — M54.31 SCIATICA OF RIGHT SIDE: ICD-10-CM

## 2019-04-15 DIAGNOSIS — M51.36 DDD (DEGENERATIVE DISC DISEASE), LUMBAR: Primary | ICD-10-CM

## 2019-04-15 LAB
B-HCG UR QL: NEGATIVE
INTERNAL NEGATIVE CONTROL: NEGATIVE
INTERNAL POSITIVE CONTROL: POSITIVE
Lab: NORMAL

## 2019-04-15 PROCEDURE — 99283 EMERGENCY DEPT VISIT LOW MDM: CPT

## 2019-04-15 PROCEDURE — 25010000002 KETOROLAC TROMETHAMINE PER 15 MG: Performed by: NURSE PRACTITIONER

## 2019-04-15 PROCEDURE — 81025 URINE PREGNANCY TEST: CPT | Performed by: NURSE PRACTITIONER

## 2019-04-15 PROCEDURE — 72100 X-RAY EXAM L-S SPINE 2/3 VWS: CPT

## 2019-04-15 PROCEDURE — 72040 X-RAY EXAM NECK SPINE 2-3 VW: CPT

## 2019-04-15 PROCEDURE — 96372 THER/PROPH/DIAG INJ SC/IM: CPT

## 2019-04-15 PROCEDURE — 73502 X-RAY EXAM HIP UNI 2-3 VIEWS: CPT

## 2019-04-15 RX ORDER — CYCLOBENZAPRINE HCL 10 MG
10 TABLET ORAL ONCE
Status: COMPLETED | OUTPATIENT
Start: 2019-04-15 | End: 2019-04-15

## 2019-04-15 RX ORDER — CYCLOBENZAPRINE HCL 10 MG
10 TABLET ORAL 3 TIMES DAILY PRN
Qty: 15 TABLET | Refills: 0 | Status: SHIPPED | OUTPATIENT
Start: 2019-04-15 | End: 2022-06-30

## 2019-04-15 RX ORDER — KETOROLAC TROMETHAMINE 30 MG/ML
30 INJECTION, SOLUTION INTRAMUSCULAR; INTRAVENOUS ONCE
Status: COMPLETED | OUTPATIENT
Start: 2019-04-15 | End: 2019-04-15

## 2019-04-15 RX ORDER — IBUPROFEN 800 MG/1
800 TABLET ORAL
Qty: 15 TABLET | Refills: 0 | Status: SHIPPED | OUTPATIENT
Start: 2019-04-15 | End: 2021-03-26

## 2019-04-15 RX ADMIN — CYCLOBENZAPRINE HYDROCHLORIDE 10 MG: 10 TABLET, FILM COATED ORAL at 19:40

## 2019-04-15 RX ADMIN — KETOROLAC TROMETHAMINE 30 MG: 30 INJECTION, SOLUTION INTRAMUSCULAR at 19:41

## 2021-02-08 ENCOUNTER — TELEPHONE (OUTPATIENT)
Dept: ORTHOPEDIC SURGERY | Facility: CLINIC | Age: 46
End: 2021-02-08

## 2021-02-08 NOTE — TELEPHONE ENCOUNTER
Are you willing to refill the Meloxicam from 2018 for the patient or should she get this from PCP?    Essence

## 2021-02-08 NOTE — TELEPHONE ENCOUNTER
Caller: PATIENT    Relationship: SELF    Best call back number: [phone]    Medication needed: INFLAMMATORY MEDICATION. PATIENT STATED MS. PEGGY CORINNE ANTHONY HAD PRESCRIBED HER A INFLAMMATORY MEDICATION PRIOR AND PATIENT IS REQUESTING A REFILL OF THAT PRESCRIPTION PRIOR TO HER APPT THAT IS SCHEDULED FOR Friday, 02.12.21. PATIENT DID NOT KNOW THE NAME OF THAT PRESCRIPTION BUT STATED SHE WOULD LIKE A REFILL ON THAT ASAP.    When do you need the refill by: ASAP    What details did the patient provide when requesting the medication: PATIENT STATED THE INFLAMMATION IN HER KNEE IS SEVERE AND SHE WOULD LIKE A REFILL ON THE PRESCRIPTION THAT MS. WOODS CORINNE ANTHONY HAD PRESCRIBED HER IN THE PAST. PATIENT DID NOT KNOW THE NAME OF THE INFLAMMATION MEDICATION. PATIENT WOULD LIKE A CALL TO LET HER KNOW IF SHE IS ABLE TO GET A REFILL ON THE PRESCRIPTION OR NOT. PLEASE ADVISE.    Does the patient have less than a 3 day supply:  [x] Yes  [] No    What is the patient's preferred pharmacy:    [facility]

## 2021-02-08 NOTE — TELEPHONE ENCOUNTER
I spoke with the patient and advised that she needs to go through her PCP for the medication. She mentioned that she does not have one. I told her that since we have not seen her in the office we cannot prescribe this for her. She mentioned she would see us on Friday and hopefully get it then.    Essence

## 2021-02-08 NOTE — TELEPHONE ENCOUNTER
JUSTINM for the patient to return my call. When she calls back, have her transferred to clinic.    Essence

## 2021-02-12 ENCOUNTER — OFFICE VISIT (OUTPATIENT)
Dept: ORTHOPEDIC SURGERY | Facility: CLINIC | Age: 46
End: 2021-02-12

## 2021-02-12 VITALS — HEART RATE: 77 BPM | WEIGHT: 213 LBS | BODY MASS INDEX: 36.37 KG/M2 | HEIGHT: 64 IN | OXYGEN SATURATION: 98 %

## 2021-02-12 DIAGNOSIS — M25.462 EFFUSION OF LEFT KNEE: ICD-10-CM

## 2021-02-12 DIAGNOSIS — M17.12 PRIMARY OSTEOARTHRITIS OF LEFT KNEE: ICD-10-CM

## 2021-02-12 DIAGNOSIS — G89.29 CHRONIC PAIN OF LEFT KNEE: Primary | ICD-10-CM

## 2021-02-12 DIAGNOSIS — E66.09 CLASS 2 OBESITY DUE TO EXCESS CALORIES WITH BODY MASS INDEX (BMI) OF 36.0 TO 36.9 IN ADULT, UNSPECIFIED WHETHER SERIOUS COMORBIDITY PRESENT: ICD-10-CM

## 2021-02-12 DIAGNOSIS — M25.562 CHRONIC PAIN OF LEFT KNEE: Primary | ICD-10-CM

## 2021-02-12 PROCEDURE — 20610 DRAIN/INJ JOINT/BURSA W/O US: CPT | Performed by: PHYSICIAN ASSISTANT

## 2021-02-12 PROCEDURE — 99214 OFFICE O/P EST MOD 30 MIN: CPT | Performed by: PHYSICIAN ASSISTANT

## 2021-02-12 RX ORDER — IBUPROFEN 800 MG/1
800 TABLET ORAL 3 TIMES DAILY
Qty: 90 TABLET | Refills: 2 | Status: SHIPPED | OUTPATIENT
Start: 2021-02-12 | End: 2021-03-26 | Stop reason: ALTCHOICE

## 2021-02-12 RX ADMIN — LIDOCAINE HYDROCHLORIDE 4 ML: 10 INJECTION, SOLUTION EPIDURAL; INFILTRATION; INTRACAUDAL; PERINEURAL at 11:50

## 2021-02-12 RX ADMIN — TRIAMCINOLONE ACETONIDE 40 MG: 40 INJECTION, SUSPENSION INTRA-ARTICULAR; INTRAMUSCULAR at 11:50

## 2021-02-12 NOTE — PROGRESS NOTES
"    [facility] [place] [facility] Clinic Note        Subjective     CC: Follow-up (2 year follow up - Chronic pain of left knee )      HPI    Sully Yost is a 46 y.o. female.  Patient returns today for follow-up evaluation of her left knee.  She was treated with aspiration, corticosteroid injection in June 2018.  She reports by October 2020 she started noticing increasing pain to the left knee.    Currently she endorses a pain scale of 9/10.  Severity the pain moderate to severe.  Quality pain sharp, stabbing, throbbing.  Associated symptoms swelling, popping, grinding, stiffness and giving away.  Symptoms are worse with walking, standing, stair climbing.  No reported numbness or tingling.    Overall, patient's symptoms are worse.    Patient is nondiabetic, non-smoker.  She has a BMI 36.54.    ROS:    Constiutional:Pt denies fever, chills, nausea, or vomiting.  MSK:as above        Objective      Past Medical History  Past Medical History:   Diagnosis Date   • Anxiety    • Arthritis of back    • Asthma    • Migraine          Physical Exam  Pulse 77   Ht 162.6 cm (64.02\")   Wt 96.6 kg (213 lb)   SpO2 98%   BMI 36.54 kg/m²     Body mass index is 36.54 kg/m².    Patient is well nourished and well developed.        Ortho Exam  Integument:              Left knee: No skin lesions, no rash, no ecchymosis.  No redness, warmth.  No evidence of knee infection.     Neurologic:              Sensation:                          Left foot: Intact to light touch on the dorsal and plantar aspect              Motor:  Left lower extremity: 5/5 quadriceps, hamstrings, ankle dorsiflexors, and ankle plantar flexors  Vascular:              Left lower extremity: 2+ dorsalis pedis pulse, prompt capillary refill     Lower Extremities:              Left Knee:                          Tenderness:    Global tenderness throughout the knee with increased pain medial greater than lateral joint lines.                            Effusion: "          2+                          Swelling:          None                          Crepitus:          Positive                          Atrophy:           None                          Range of motion:        Extension:       0°                                                              Flexion:           110° with overpressure  Instability:         No varus laxity, no valgus laxity, negative anterior drawer                          Deformities:     None       Imaging/Labs/EMG Reviewed:  Ordered left knee plain films.  Imaging read by Dr. Bergman.    Knee X-Ray  Indication: Pain     Upright AP of bilateral knees. Lateral, skiers and Sunrise views of left knee     Findings: Arthritic changes with medial joint space narrowing and patella osteochondral changes  No fracture     No prior studies were available for comparison.      Assessment:  1. Chronic pain of left knee    2. Primary osteoarthritis of left knee    3. Effusion of left knee    4. Class 2 obesity due to excess calories with body mass index (BMI) of 36.0 to 36.9 in adult, unspecified whether serious comorbidity present        Plan:  1. Chronic left knee pain due to primary osteoarthritis with joint effusion.  2. Offered and accepted joint aspiration followed by injection of corticosteroid.  Both aspiration and injection were performed today.  3. Patient had been previously prescribed meloxicam but that is no longer on her insurance plan.  She was prescribed ibuprofen 800 mg today.  4. Obesity--patient has a BMI of 36.54 which needs to be watched.  Briefly discussed weight loss by dieting, portion control, calorie restriction, non to low impact exercise, referral to weight loss management and/or bariatric surgery.  It was explained that weight loss can improve joint pain alone by decreasing the joint reaction forces.  For every pound of weight change, the knee and hip joints see a 4 to 5 fold multiplier affect.  Given these options, the patient will  proceed with diet and low impact exercise.  5. Follow-up in 6 weeks for repeat evaluation, sooner if issues arise or symptoms worsen/change.  Depending on her response to the aspiration/injection today would consider Visco supplementation series as additional options for pain management.  6. Questions and concerns answered.    After discussing the risks of aspiration and injection, the patient gave consent to proceed.  Her left knee was confirmed as the correct joint to be aspirated and injected with a timeout.  It was then prepped using chlorhexidine and under sterile conditions 20 cc of clear straw-colored fluid was aspirated.  Then corticosteroid injection was given intra-articularly with a mixture of 4 cc of 1% plain lidocaine and 1 cc of Kenalog (40 mg per mL) without any resistance through the lateral approach, patient in supine position.  Area was cleaned, hemostasis was achieved and a Band-Aid was applied over the injection site.  The patient tolerated procedure well.  I instructed the patient on signs and symptoms of infection.  They should report to the emergency department or return to clinic if any of these develop, for further evaluation and treatment.  Recommended modifying activity for the next 48 hours to include rest, ice, elevation and oral pain medication as needed.  Patient was observed ambulating normally after the aspiration and injection.      Mindi Jordan PA-C  02/15/21  10:18 EST      Dragon disclaimer:  Much of this encounter note is an electronic transcription/translation of spoken language to printed text. The electronic translation of spoken language may permit erroneous, or at times, nonsensical words or phrases to be inadvertently transcribed; Although I have reviewed the note for such errors, some may still exist.

## 2021-02-12 NOTE — PROGRESS NOTES
Procedure   Large Joint Arthrocentesis: L knee  Date/Time: 2/12/2021 11:50 AM  Consent given by: patient  Site marked: site marked  Timeout: Immediately prior to procedure a time out was called to verify the correct patient, procedure, equipment, support staff and site/side marked as required   Supporting Documentation  Indications: pain   Procedure Details  Location: knee - L knee  Preparation: Patient was prepped and draped in the usual sterile fashion  Needle size: 18 G  Approach: anterolateral  Medications administered: 4 mL lidocaine PF 1% 1 %; 40 mg triamcinolone acetonide 40 MG/ML  Aspirate amount: 20 mL  Aspirate: yellow and clear  Patient tolerance: patient tolerated the procedure well with no immediate complications

## 2021-02-15 RX ORDER — LIDOCAINE HYDROCHLORIDE 10 MG/ML
4 INJECTION, SOLUTION EPIDURAL; INFILTRATION; INTRACAUDAL; PERINEURAL
Status: COMPLETED | OUTPATIENT
Start: 2021-02-12 | End: 2021-02-12

## 2021-02-15 RX ORDER — TRIAMCINOLONE ACETONIDE 40 MG/ML
40 INJECTION, SUSPENSION INTRA-ARTICULAR; INTRAMUSCULAR
Status: COMPLETED | OUTPATIENT
Start: 2021-02-12 | End: 2021-02-12

## 2021-03-26 ENCOUNTER — OFFICE VISIT (OUTPATIENT)
Dept: ORTHOPEDIC SURGERY | Facility: CLINIC | Age: 46
End: 2021-03-26

## 2021-03-26 VITALS
BODY MASS INDEX: 36.7 KG/M2 | WEIGHT: 215 LBS | DIASTOLIC BLOOD PRESSURE: 75 MMHG | HEART RATE: 82 BPM | HEIGHT: 64 IN | SYSTOLIC BLOOD PRESSURE: 158 MMHG

## 2021-03-26 DIAGNOSIS — M17.12 PRIMARY OSTEOARTHRITIS OF LEFT KNEE: Primary | ICD-10-CM

## 2021-03-26 DIAGNOSIS — G89.29 CHRONIC PAIN OF LEFT KNEE: ICD-10-CM

## 2021-03-26 DIAGNOSIS — M25.562 CHRONIC PAIN OF LEFT KNEE: ICD-10-CM

## 2021-03-26 PROCEDURE — 99213 OFFICE O/P EST LOW 20 MIN: CPT | Performed by: PHYSICIAN ASSISTANT

## 2021-03-26 RX ORDER — MELOXICAM 15 MG/1
15 TABLET ORAL DAILY
Qty: 90 TABLET | Refills: 2 | Status: SHIPPED | OUTPATIENT
Start: 2021-03-26 | End: 2021-08-09 | Stop reason: SDUPTHER

## 2021-03-26 RX ORDER — MELOXICAM 15 MG/1
15 TABLET ORAL DAILY
COMMUNITY
End: 2021-03-26 | Stop reason: SDUPTHER

## 2021-03-26 NOTE — PROGRESS NOTES
"    [facility] [place] [facility] Note        Subjective     CC: Follow-up (6 week follow up; Chronic pain of left knee-last aspiration and injection given at last visit 2/12/21)      HPI    Sully Yost is a 46 y.o. female. Patient returns following corticosteroid to left knee on 2/12/2021.  She reports symptoms improved with injection.    Pain scale: 3/10. Associated symptoms popping, swelling, grinding.  Pain worse with walking, standing (sometimes) and stairs.  No numbness or tingling into the extremity.     Overall, patient's symptoms are improved following steroid injection.    ROS:    Constiutional:Pt denies fever, chills, nausea, or vomiting.  MSK:as above        Objective      Past Medical History  Past Medical History:   Diagnosis Date   • Anxiety    • Arthritis of back    • Asthma    • Migraine          Physical Exam  /75   Pulse 82   Ht 162.6 cm (64.02\")   Wt 97.5 kg (215 lb)   BMI 36.89 kg/m²     Body mass index is 36.89 kg/m².    Patient is well nourished and well developed.        Ortho Exam  Left knee  Trace effusion noted  Diffuse tenderness but improved after injection.  0-110 with crepitus      Imaging/Labs/EMG Reviewed:  No new imaging today.       Assessment:  1. Primary osteoarthritis of left knee    2. Chronic pain of left knee        Plan:  1. Osteoarthritis left knee causing chronic pain.  2. Placed pre-authorization for visco supplementation injection(s).  3. Once approved patient will return to clinic to start series.   4. Patient requesting refill meloxicam but that is no longer on her insurance plan.  She would still like it prescribed, done.  If cost is too much then return to using ibuprofen 800 mg TID as needed.  DC medication if GI issues develop.  5. Follow-up when injections approved.  Additionally discussed possible referral to rheumatology for recurrent effusions.  6. Questions and concerns answered.      Mindi Jordan PA-C  03/30/21  09:24 " SAHARA Hummel disclaimer:  Much of this encounter note is an electronic transcription/translation of spoken language to printed text. The electronic translation of spoken language may permit erroneous, or at times, nonsensical words or phrases to be inadvertently transcribed; Although I have reviewed the note for such errors, some may still exist.

## 2021-04-16 ENCOUNTER — CLINICAL SUPPORT (OUTPATIENT)
Dept: ORTHOPEDIC SURGERY | Facility: CLINIC | Age: 46
End: 2021-04-16

## 2021-04-16 DIAGNOSIS — M17.12 PRIMARY OSTEOARTHRITIS OF LEFT KNEE: ICD-10-CM

## 2021-04-16 PROCEDURE — 20610 DRAIN/INJ JOINT/BURSA W/O US: CPT | Performed by: PHYSICIAN ASSISTANT

## 2021-04-16 NOTE — PROGRESS NOTES
CC: Follow-up left knee osteoarthritis, 1/3 Orthovisc injection today    History of present illness: Patient presents for her first Orthovisc injection to the left knee today.  At this time she denies any numbness or tingling into the distal extremity.  No fever, chills, night sweats or other constitutional symptoms.    See chart for PMH, PSH, Meds, All - reviewed.    Ortho exam:  Left knee  Skin is intact without redness, warmth.  Trace effusion.  No lesions or evidence of infection noted.  Motor/sensory: Grossly intact L2-S1.    Assessment/plan:  Left knee osteoarthritis    Proceed today with 1/3 of Orthovisc injection.  Patient will follow-up in week for second injection.      After discussing risks of injection the patient gave consent to proceed.  Her left knee was confirmed as the correct joint to be injected with a timeout.  The knee was then prepped with Hibiclens and injected with a prefilled syringe of Orthovisc without any resistance using anterior lateral approach, patient in seated position.  The patient tolerated procedure well.  Hemostasis was achieved and a Band-Aid was applied over the injection site.  I instructed the patient on signs and symptoms of infection.  They should report to the ED if any of these develop.  Recommended modifying activity to include rest, ice, elevation and/or heat along with oral pain medication as needed.  Patient observed ambulating normally after the injection.

## 2021-04-16 NOTE — PROGRESS NOTES
Procedure   Large Joint Arthrocentesis: L knee  Date/Time: 4/16/2021 9:30 AM  Consent given by: patient  Timeout: Immediately prior to procedure a time out was called to verify the correct patient, procedure, equipment, support staff and site/side marked as required   Supporting Documentation  Indications: pain   Procedure Details  Location: knee - L knee  Needle size: 23 G  Approach: anteromedial  Medications administered: 30 mg Hyaluronan 30 MG/2ML  Patient tolerance: patient tolerated the procedure well with no immediate complications

## 2021-04-23 ENCOUNTER — CLINICAL SUPPORT (OUTPATIENT)
Dept: ORTHOPEDIC SURGERY | Facility: CLINIC | Age: 46
End: 2021-04-23

## 2021-04-23 DIAGNOSIS — M17.12 PRIMARY OSTEOARTHRITIS OF LEFT KNEE: Primary | ICD-10-CM

## 2021-04-23 PROCEDURE — 20610 DRAIN/INJ JOINT/BURSA W/O US: CPT | Performed by: PHYSICIAN ASSISTANT

## 2021-04-23 NOTE — PROGRESS NOTES
Procedure   Large Joint Arthrocentesis: L knee  Date/Time: 4/23/2021 9:58 AM  Consent given by: patient  Site marked: site marked  Timeout: Immediately prior to procedure a time out was called to verify the correct patient, procedure, equipment, support staff and site/side marked as required   Supporting Documentation  Indications: pain   Procedure Details  Location: knee - L knee  Preparation: Patient was prepped and draped in the usual sterile fashion  Needle size: 23 G  Approach: anterolateral  Medications administered: 30 mg Hyaluronan 30 MG/2ML  Patient tolerance: patient tolerated the procedure well with no immediate complications

## 2021-04-23 NOTE — PROGRESS NOTES
CC: Follow-up left knee osteoarthritis, 2/3 Orthovisc injection today     History of present illness: Patient presents for her second Orthovisc injection to the left knee today.  At this time she denies any numbness or tingling into the distal extremity.  No fever, chills, night sweats or other constitutional symptoms.     See chart for PMH, PSH, Meds, All - reviewed.     Ortho exam:  Left knee  Skin is intact without redness, warmth.  Trace effusion.  No lesions or evidence of infection noted.  Motor/sensory: Grossly intact L2-S1.     Assessment/plan:  Left knee osteoarthritis     Proceed today with 2/3 of Orthovisc injection.  Patient will follow-up in week for third injection.       After discussing risks of injection the patient gave consent to proceed.  Her left knee was confirmed as the correct joint to be injected with a timeout.  The knee was then prepped with Hibiclens and injected with a prefilled syringe of Orthovisc without any resistance using anterior lateral approach, patient in seated position.  The patient tolerated procedure well.  Hemostasis was achieved and a Band-Aid was applied over the injection site.  I instructed the patient on signs and symptoms of infection.  They should report to the ED if any of these develop.  Recommended modifying activity to include rest, ice, elevation and/or heat along with oral pain medication as needed.  Patient observed ambulating normally after the injection.

## 2021-04-30 ENCOUNTER — CLINICAL SUPPORT (OUTPATIENT)
Dept: ORTHOPEDIC SURGERY | Facility: CLINIC | Age: 46
End: 2021-04-30

## 2021-04-30 DIAGNOSIS — M17.12 PRIMARY OSTEOARTHRITIS OF LEFT KNEE: Primary | ICD-10-CM

## 2021-04-30 PROCEDURE — 20610 DRAIN/INJ JOINT/BURSA W/O US: CPT | Performed by: PHYSICIAN ASSISTANT

## 2021-04-30 NOTE — PROGRESS NOTES
Procedure   Large Joint Arthrocentesis: L knee  Date/Time: 4/30/2021 10:01 AM  Consent given by: patient  Site marked: site marked  Timeout: Immediately prior to procedure a time out was called to verify the correct patient, procedure, equipment, support staff and site/side marked as required   Supporting Documentation  Indications: pain   Procedure Details  Location: knee - L knee  Preparation: Patient was prepped and draped in the usual sterile fashion  Needle size: 23 G  Approach: anterolateral  Medications administered: 30 mg Hyaluronan 30 MG/2ML  Patient tolerance: patient tolerated the procedure well with no immediate complications

## 2021-04-30 NOTE — PROGRESS NOTES
CC: Follow-up left knee osteoarthritis, 3/3 Orthovisc injection today     History of present illness: Patient presents for her third Orthovisc injection to the left knee today.  At this time she denies any numbness or tingling into the distal extremity.  No fever, chills, night sweats or other constitutional symptoms.    Patient has not noticed much change with starting the series.  She is taking prescribed NSAIDs.     See chart for PMH, PSH, Meds, All - reviewed.     Ortho exam:  Left knee  Skin is intact without redness, warmth.  Trace effusion.  No lesions or evidence of infection noted.  Motor/sensory: Grossly intact L2-S1.     Assessment/plan:  Left knee osteoarthritis     Proceed today with 3/3 of Orthovisc injection.  Patient will follow-up in 3 months.       After discussing risks of injection the patient gave consent to proceed.  Her left knee was confirmed as the correct joint to be injected with a timeout.  The knee was then prepped with Hibiclens and injected with a prefilled syringe of Orthovisc without any resistance using anterior lateral approach, patient in seated position.  The patient tolerated procedure well.  Hemostasis was achieved and a Band-Aid was applied over the injection site.  I instructed the patient on signs and symptoms of infection.  They should report to the ED if any of these develop.  Recommended modifying activity to include rest, ice, elevation and/or heat along with oral pain medication as needed.  Patient observed ambulating normally after the injection.

## 2021-08-09 ENCOUNTER — OFFICE VISIT (OUTPATIENT)
Dept: ORTHOPEDIC SURGERY | Facility: CLINIC | Age: 46
End: 2021-08-09

## 2021-08-09 VITALS
WEIGHT: 206 LBS | DIASTOLIC BLOOD PRESSURE: 81 MMHG | BODY MASS INDEX: 35.17 KG/M2 | SYSTOLIC BLOOD PRESSURE: 138 MMHG | HEIGHT: 64 IN | HEART RATE: 67 BPM

## 2021-08-09 DIAGNOSIS — E66.09 CLASS 2 OBESITY DUE TO EXCESS CALORIES WITHOUT SERIOUS COMORBIDITY WITH BODY MASS INDEX (BMI) OF 35.0 TO 35.9 IN ADULT: ICD-10-CM

## 2021-08-09 DIAGNOSIS — M17.12 PRIMARY OSTEOARTHRITIS OF LEFT KNEE: Primary | ICD-10-CM

## 2021-08-09 DIAGNOSIS — Z72.0 TOBACCO USE: ICD-10-CM

## 2021-08-09 DIAGNOSIS — M25.562 CHRONIC PAIN OF LEFT KNEE: ICD-10-CM

## 2021-08-09 DIAGNOSIS — G89.29 CHRONIC PAIN OF LEFT KNEE: ICD-10-CM

## 2021-08-09 PROCEDURE — 20610 DRAIN/INJ JOINT/BURSA W/O US: CPT | Performed by: PHYSICIAN ASSISTANT

## 2021-08-09 PROCEDURE — 99214 OFFICE O/P EST MOD 30 MIN: CPT | Performed by: PHYSICIAN ASSISTANT

## 2021-08-09 RX ORDER — MELOXICAM 15 MG/1
15 TABLET ORAL DAILY
Qty: 90 TABLET | Refills: 2 | Status: SHIPPED | OUTPATIENT
Start: 2021-08-09 | End: 2022-06-30

## 2021-08-09 RX ORDER — CEFUROXIME AXETIL 500 MG/1
500 TABLET ORAL 2 TIMES DAILY
COMMUNITY
Start: 2021-08-02 | End: 2021-11-15

## 2021-08-09 RX ADMIN — TRIAMCINOLONE ACETONIDE 40 MG: 40 INJECTION, SUSPENSION INTRA-ARTICULAR; INTRAMUSCULAR at 11:18

## 2021-08-09 RX ADMIN — LIDOCAINE HYDROCHLORIDE 4 ML: 10 INJECTION, SOLUTION EPIDURAL; INFILTRATION; INTRACAUDAL; PERINEURAL at 11:18

## 2021-08-09 NOTE — PROGRESS NOTES
"    [facility] [place] [facility] Clinic Note        Subjective     CC: Follow-up (3 month f/u--Primary osteoarthritis of left knee )      HPI    Sully Yost is a 46 y.o. female.  Patient returns today for follow-up evaluation of left knee.  She has known osteoarthritis.  She was given a viscosupplementation series in April 2021 which she said worked quite well but now she is noticing pain beginning to increase.  Additionally patient has changed her job and is no longer walking for prolonged periods of time on concrete.  She now works at a .  She is also been working on weight loss.  Patient has also been taking meloxicam which does help.    Without the meloxicam she endorses a pain scale of 8/10.  With the anti-inflammatory 5/10.  Associated symptoms swelling.  Pain worsened with walking, prolonged standing, stair climbing and rising from a seated position.    Overall, patient's symptoms are improved following viscosupplementation but slowly worsening as medication begins to wear off.    ROS:    Constiutional:Pt denies fever, chills, nausea, or vomiting.  MSK:as above        Objective      Past Medical History  Past Medical History:   Diagnosis Date   • Anxiety    • Arthritis of back    • Asthma    • Migraine          Physical Exam  /81   Pulse 67   Ht 162.6 cm (64.02\")   Wt 93.4 kg (206 lb)   BMI 35.34 kg/m²     Body mass index is 35.34 kg/m².    Patient is well nourished and well developed.        Ortho Exam  Left knee  Skin: Grossly intact without any redness, warmth.  Trace effusion noted.  Tenderness: Positive medial greater than lateral joint line.  Motion: 0-115 degrees with pain at end range of motion as well as crepitus appreciable.  Motor/sensory: Grossly intact L2-S1  Testing: Negative instability with regards to varus and valgus stress test.  Lachman's negative.      Imaging/Labs/EMG Reviewed:  No new imaging today.      Assessment:  1. Primary osteoarthritis of left knee    2. Chronic " pain of left knee    3. Class 2 obesity due to excess calories without serious comorbidity with body mass index (BMI) of 35.0 to 35.9 in adult    4. Tobacco use        Plan:  1. Left knee osteoarthritis causing chronic pain (pain slowly worsening)--offered and accepted corticosteroid injection to left knee.  Injection was given today.  2. Refilled/prescribed meloxicam as it is worked well in the past.  3. Obesity--patient with a BMI of 35.34.  Since her last appointment/way in in March she has lost 6 pounds.  Continue with diet, portion control and non to low impact exercises for continued weight loss.  4. Tobacco use--discussed the importance of smoking cessation, reviewed the adverse effects of tobacco use: increased risk of tendonitis (more difficult to treat and resolve), hardening of the arteries, gangrene, amputation, etc. Included in the counseling were treatments options for cessation: quitting cold turkey, medication, nicotine step-down programs and smoking cessation programs. Furthermore, I explained to the patient the importance of abstaining from nicotine usage as nicotine is known to increase risk of complications associated with surgery including but not limited to infection, decreased wound healing, fracture/fusion nonunion, slowed soft tissue healing, and increased surgery associated pain.  Patient understands that she will need to be tobacco free in order to proceed with surgical intervention. (5 minutes spent counseling patient).  5. Follow-up 3 months for repeat of viscosupplementation series.  She will need to contact the clinic 1 to 2 weeks prior to that appointment so preauthorization can be obtained.  6. Questions and concerns answered.    After discussing the risks, benefits, indications of injection, the patient gave consent to proceed.  Her left knee was confirmed as the correct joint to be injected with a timeout.  It was then prepped using Hibiclens and injected with a mixture of 4 cc of  1% plain lidocaine and 1 cc of Kenalog (40 mg per mL), without any resistance through the anterior lateral approach, patient in seated position.  Area was cleaned, hemostasis was achieved and a Band-Aid was applied over the injection site.  The patient tolerated procedure well.  I instructed the patient on signs and symptoms of infection.  They should report to the emergency department or return to clinic if any of these develop, for further evaluation and treatment.  Recommended modifying activity for the next 48 hours to include rest, ice, elevation and oral pain medication as needed.        Mindi Jordan PA-C  08/12/21  14:35 EDT      Dragon disclaimer:  Much of this encounter note is an electronic transcription/translation of spoken language to printed text. The electronic translation of spoken language may permit erroneous, or at times, nonsensical words or phrases to be inadvertently transcribed; Although I have reviewed the note for such errors, some may still exist.

## 2021-08-12 RX ORDER — TRIAMCINOLONE ACETONIDE 40 MG/ML
40 INJECTION, SUSPENSION INTRA-ARTICULAR; INTRAMUSCULAR
Status: COMPLETED | OUTPATIENT
Start: 2021-08-09 | End: 2021-08-09

## 2021-08-12 RX ORDER — LIDOCAINE HYDROCHLORIDE 10 MG/ML
4 INJECTION, SOLUTION EPIDURAL; INFILTRATION; INTRACAUDAL; PERINEURAL
Status: COMPLETED | OUTPATIENT
Start: 2021-08-09 | End: 2021-08-09

## 2021-11-15 ENCOUNTER — OFFICE VISIT (OUTPATIENT)
Dept: ORTHOPEDIC SURGERY | Facility: CLINIC | Age: 46
End: 2021-11-15

## 2021-11-15 VITALS
DIASTOLIC BLOOD PRESSURE: 86 MMHG | SYSTOLIC BLOOD PRESSURE: 138 MMHG | WEIGHT: 214.2 LBS | HEART RATE: 81 BPM | HEIGHT: 64 IN | BODY MASS INDEX: 36.57 KG/M2

## 2021-11-15 DIAGNOSIS — M25.462 EFFUSION OF LEFT KNEE: ICD-10-CM

## 2021-11-15 DIAGNOSIS — E66.09 CLASS 2 OBESITY DUE TO EXCESS CALORIES WITH BODY MASS INDEX (BMI) OF 36.0 TO 36.9 IN ADULT, UNSPECIFIED WHETHER SERIOUS COMORBIDITY PRESENT: ICD-10-CM

## 2021-11-15 DIAGNOSIS — G89.29 CHRONIC PAIN OF LEFT KNEE: ICD-10-CM

## 2021-11-15 DIAGNOSIS — Z72.0 TOBACCO USE: ICD-10-CM

## 2021-11-15 DIAGNOSIS — M25.562 CHRONIC PAIN OF LEFT KNEE: ICD-10-CM

## 2021-11-15 DIAGNOSIS — M17.12 PRIMARY OSTEOARTHRITIS OF LEFT KNEE: Primary | ICD-10-CM

## 2021-11-15 PROCEDURE — 99213 OFFICE O/P EST LOW 20 MIN: CPT | Performed by: PHYSICIAN ASSISTANT

## 2021-11-15 RX ORDER — ALBUTEROL SULFATE 90 UG/1
2 AEROSOL, METERED RESPIRATORY (INHALATION) EVERY 6 HOURS PRN
COMMUNITY
Start: 2022-09-16

## 2021-11-15 RX ORDER — IBUPROFEN 600 MG/1
TABLET ORAL
COMMUNITY
Start: 2021-09-26 | End: 2022-06-30

## 2021-11-15 NOTE — PROGRESS NOTES
"    [facility] [place] [facility] Clinic Note        Subjective     CC: Follow-up (3 month recheck - Primary osteoarthritis of left knee )      HPI    Sully Yost is a 46 y.o. female.  Patient returns today for follow-up left knee.  She was last seen in August 2021 and provided corticosteroid injection.  Upon return today she notes increased pain as well as return of swelling to the knee.  Last viscosupplementation series was April 2021.  She is interested in trying the viscosupplementation series again.    Currently she endorses a pain scale of 8/10.  Associated symptoms swelling.  Pain is worse with walking, stair climbing.  Sitting helps.  No reported numbness or tingling.  No mechanical symptoms such as locking or catching to the knee.    She still has her meloxicam that I prescribed her back in August but she does not take it on a routine basis.  She began taking it again.    Overall, patient's symptoms are worsening as prior injections have worn off and swelling has returned.    ROS:    Constiutional:Pt denies fever, chills, nausea, or vomiting.  MSK:as above        Objective      Past Medical History  Past Medical History:   Diagnosis Date   • Anxiety    • Arthritis of back    • Asthma    • Migraine          Physical Exam  /86   Pulse 81   Ht 162.6 cm (64.02\")   Wt 97.2 kg (214 lb 3.2 oz)   BMI 36.75 kg/m²     Body mass index is 36.75 kg/m².    Patient is well nourished and well developed.        Ortho Exam  Left knee  Skin: Grossly intact without any redness or warmth.  1+ effusion noted.  Tenderness: Diffuse tenderness throughout the knee with increased pain medial greater than lateral joint line.  Motion: 0-115 degrees with crepitus and pain noted throughout.  Motor/sensory: Grossly intact L2-S1.      Imaging/Labs/EMG Reviewed:  No new imaging today.      Assessment:  1. Primary osteoarthritis of left knee    2. Chronic pain of left knee    3. Effusion of left knee    4. Class 2 obesity due to " excess calories with body mass index (BMI) of 36.0 to 36.9 in adult, unspecified whether serious comorbidity present    5. Tobacco use        Plan:  1. Left knee osteoarthritis and joint effusion causing chronic pain.  2. Patient would like to try viscosupplementation series.  Preauthorization was placed.  If denied then we will proceed with corticosteroid injection.  3. She needs to return to using her meloxicam for inflammation/pain control.  4. Obesity--patient has had no change in her weight.  She continues to be around BMI 36.  She is to continue working with diet and portion control as well as non to low impact exercises.  5. Tobacco use--patient has continued to smoke but is working on cutting down.  She reports less than half pack a day smoker.  6. Follow-up when injections are available to be started.  7. Questions and concerns answered.      Mindi Jordan PA-C  11/19/21  09:28 EST      Dictated Utilizing Dragon Dictation.

## 2021-12-07 ENCOUNTER — TELEPHONE (OUTPATIENT)
Dept: ORTHOPEDIC SURGERY | Facility: CLINIC | Age: 46
End: 2021-12-07

## 2021-12-20 ENCOUNTER — CLINICAL SUPPORT (OUTPATIENT)
Dept: ORTHOPEDIC SURGERY | Facility: CLINIC | Age: 46
End: 2021-12-20

## 2021-12-20 DIAGNOSIS — M17.12 PRIMARY OSTEOARTHRITIS OF LEFT KNEE: ICD-10-CM

## 2021-12-20 PROCEDURE — 20610 DRAIN/INJ JOINT/BURSA W/O US: CPT | Performed by: PHYSICIAN ASSISTANT

## 2021-12-20 NOTE — PROGRESS NOTES

## 2021-12-20 NOTE — PROGRESS NOTES
CC: Follow-up left knee osteoarthritis, 1/3 Orthovisc injection today    History of present illness: Patient presents for her first Orthovisc injection to the left knee today.  At this time she denies any numbness or tingling into the distal extremity.  No fever, chills, night sweats or other constitutional symptoms.    Patient reports she is changed jobs, no longer working at .  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    See chart for PMH, PSH, Meds, All - reviewed.    Ortho exam:  Left knee  Skin is intact without redness, warmth.  Only trace effusion noted not as significant as it was from her last appointment.  No lesions or evidence of infection noted.  Motor/sensory: Grossly intact L2-S1.    Assessment/plan:  Left knee osteoarthritis    Proceed today with 1/3 of Orthovisc injection.  Patient will follow-up in week for second injection.      After discussing risks of injection the patient gave consent to proceed.  Left knee was confirmed as the correct joint to be injected with a timeout.  The knee was then prepped with Hibiclens and injected with a prefilled syringe of Orthovisc without any resistance using anterior lateral approach, patient in seated position.  The patient tolerated procedure well.  Hemostasis was achieved and a Band-Aid was applied over the injection site.  I instructed the patient on signs and symptoms of infection.  They should report to the ED if any of these develop.  Recommended modifying activity to include rest, ice, elevation and/or heat along with oral pain medication as needed.

## 2021-12-27 ENCOUNTER — CLINICAL SUPPORT (OUTPATIENT)
Dept: ORTHOPEDIC SURGERY | Facility: CLINIC | Age: 46
End: 2021-12-27

## 2021-12-27 DIAGNOSIS — M17.12 PRIMARY OSTEOARTHRITIS OF LEFT KNEE: Primary | ICD-10-CM

## 2021-12-27 PROCEDURE — 20610 DRAIN/INJ JOINT/BURSA W/O US: CPT | Performed by: PHYSICIAN ASSISTANT

## 2021-12-27 NOTE — PROGRESS NOTES
CC: Follow-up left knee osteoarthritis, 2/3 Orthovisc injection today     History of present illness: Patient presents for her second Orthovisc injection to the left knee today.  At this time she denies any numbness or tingling into the distal extremity.  No fever, chills, night sweats or other constitutional symptoms.     See chart for PMH, PSH, Meds, All - reviewed.     Ortho exam:  Left knee  Skin is intact without redness, warmth.  Only trace effusion noted not as significant as it was from her last appointment.  No lesions or evidence of infection noted.  Motor/sensory: Grossly intact L2-S1.     Assessment/plan:  Left knee osteoarthritis     Proceed today with 2/3 of Orthovisc injection.  Patient will follow-up in week for third injection.       After discussing risks of injection the patient gave consent to proceed.  Left knee was confirmed as the correct joint to be injected with a timeout.  The knee was then prepped with Hibiclens and injected with a prefilled syringe of Orthovisc without any resistance using anterior lateral approach, patient in seated position.  The patient tolerated procedure well.  Hemostasis was achieved and a Band-Aid was applied over the injection site.  I instructed the patient on signs and symptoms of infection.  They should report to the ED if any of these develop.  Recommended modifying activity to include rest, ice, elevation and/or heat along with oral pain medication as needed.

## 2021-12-27 NOTE — PROGRESS NOTES

## 2022-01-03 ENCOUNTER — CLINICAL SUPPORT (OUTPATIENT)
Dept: ORTHOPEDIC SURGERY | Facility: CLINIC | Age: 47
End: 2022-01-03

## 2022-01-03 DIAGNOSIS — M17.12 PRIMARY OSTEOARTHRITIS OF LEFT KNEE: Primary | ICD-10-CM

## 2022-01-03 PROCEDURE — 20610 DRAIN/INJ JOINT/BURSA W/O US: CPT | Performed by: PHYSICIAN ASSISTANT

## 2022-01-03 NOTE — PROGRESS NOTES
CC: Follow-up left knee osteoarthritis, 3/3 Orthovisc injection today     History of present illness: Patient presents for her third Orthovisc injection to the left knee today.  At this time she denies any numbness or tingling into the distal extremity.  No fever, chills, night sweats or other constitutional symptoms.     See chart for PMH, PSH, Meds, All - reviewed.     Ortho exam:  Left knee  Skin is intact without redness, warmth, trace effusion.  No lesions or evidence of infection noted.  Motor/sensory: Grossly intact L2-S1.     Assessment/plan:  Left knee osteoarthritis     Proceed today with 3/3 of Orthovisc injection.  Patient will follow-up in 3 months.       After discussing risks of injection the patient gave consent to proceed.  Left knee was confirmed as the correct joint to be injected with a timeout.  The knee was then prepped with Hibiclens and injected with a prefilled syringe of Orthovisc without any resistance using anterior lateral approach, patient in seated position.  The patient tolerated procedure well.  Hemostasis was achieved and a Band-Aid was applied over the injection site.  I instructed the patient on signs and symptoms of infection.  They should report to the ED if any of these develop.  Recommended modifying activity to include rest, ice, elevation and/or heat along with oral pain medication as needed.

## 2022-01-03 NOTE — PROGRESS NOTES

## 2022-03-08 ENCOUNTER — NURSE TRIAGE (OUTPATIENT)
Dept: CALL CENTER | Facility: HOSPITAL | Age: 47
End: 2022-03-08

## 2022-03-09 ENCOUNTER — NURSE TRIAGE (OUTPATIENT)
Dept: CALL CENTER | Facility: HOSPITAL | Age: 47
End: 2022-03-09

## 2022-03-09 NOTE — TELEPHONE ENCOUNTER
"    Reason for Disposition  • General information question, no triage required and triager able to answer question    Additional Information  • Negative: [1] Caller is not with the adult (patient) AND [2] reporting urgent symptoms  • Negative: Lab result questions  • Negative: Medication questions  • Negative: Caller can't be reached by phone  • Negative: Caller has already spoken to PCP or another triager  • Negative: RN needs further essential information from caller in order to complete triage  • Negative: Requesting regular office appointment  • Negative: [1] Caller requesting NON-URGENT health information AND [2] PCP's office is the best resource  • Negative: Health Information question, no triage required and triager able to answer question    Answer Assessment - Initial Assessment Questions  1. REASON FOR CALL or QUESTION: \"What is your reason for calling today?\" or \"How can I best help you?\" or \"What question do you have that I can help answer?\"      *No Answer*  Has yeast under her breast and under abdomen.  Went to the [facility] ER today and diagnosed with yeast outbreak. Was prescribed Clotrimazole cream to be applied BIDx 14 days. Asking what creme is for and any home remedies she should try. Advised no home remedies. Advised to use rx as prescribed for the entire 14 days. Advised washing area with mild soap water patting dry. Keeping area clean and dry.    Protocols used: INFORMATION ONLY CALL - NO TRIAGE-ADULT-      "

## 2022-03-09 NOTE — TELEPHONE ENCOUNTER
"Caller states was given medication for yeast infection and now it's back. Caller states all topical OTC not working. Caller states areas under breast, abdominal folds and vaginal/buttocks area. Caller states severe itching. Caller advised per guideline. Advised to call back as needed.         Reason for Disposition  • MODERATE-SEVERE itching (i.e., interferes with school, work, or sleep)    Additional Information  • Negative: Sounds like a life-threatening emergency to the triager  • Negative: Followed a genital area injury (e.g., vagina, vulva)  • Negative: Foreign body in vagina (e.g., tampon)  • Negative: Vaginal bleeding is main symptom  • Negative: Vaginal discharge is main symptom  • Negative: Pain or burning with passing urine (urination) is main symptom  • Negative: Menstrual cramps is main symptom  • Negative: Abdomen pain is main symptom  • Negative: Pubic lice suspected  • Negative: Itching or rash of external female genital area (vulva)  • Negative: Labor suspected  • Negative: Patient sounds very sick or weak to the triager  • Negative: [1] SEVERE pain AND [2] not improved 2 hours after pain medicine  • Negative: [1] Genital area looks infected (e.g., draining sore, spreading redness) AND [2] fever  • Negative: [1] Something is hanging out of the vagina AND [2] can't easily be pushed back inside    Answer Assessment - Initial Assessment Questions  1. SYMPTOM: \"What's the main symptom you're concerned about?\" (e.g., pain, itching, dryness)      Some painful and burning and itching   2. LOCATION: \"Where is the   located?\" (e.g., inside/outside, left/right)      Vaginal area, under breast, under abdominal folds   3. ONSET: \"When did the  symtoms start?\"      Almost month ago   4. PAIN: \"Is there any pain?\" If Yes, ask: \"How bad is it?\" (Scale: 1-10; mild, moderate, severe)      Burning   5. ITCHING: \"Is there any itching?\" If Yes, ask: \"How bad is it?\" (Scale: 1-10; mild, moderate, severe)      Severe   6. " "CAUSE: \"What do you think is causing the discharge?\" \"Have you had the same problem before? What happened then?\"      Denies   7. OTHER SYMPTOMS: \"Do you have any other symptoms?\" (e.g., fever, itching, vaginal bleeding, pain with urination, injury to genital area, vaginal foreign body)      Denies   8. PREGNANCY: \"Is there any chance you are pregnant?\" \"When was your last menstrual period?\"    Protocols used: VAGINAL SYMPTOMS-ADULT-      "

## 2022-04-06 ENCOUNTER — TELEPHONE (OUTPATIENT)
Dept: ORTHOPEDIC SURGERY | Facility: CLINIC | Age: 47
End: 2022-04-06

## 2022-04-29 ENCOUNTER — OFFICE VISIT (OUTPATIENT)
Dept: ORTHOPEDIC SURGERY | Facility: CLINIC | Age: 47
End: 2022-04-29

## 2022-04-29 VITALS
WEIGHT: 214.29 LBS | SYSTOLIC BLOOD PRESSURE: 140 MMHG | BODY MASS INDEX: 36.58 KG/M2 | DIASTOLIC BLOOD PRESSURE: 84 MMHG | HEIGHT: 64 IN

## 2022-04-29 DIAGNOSIS — M17.12 PRIMARY OSTEOARTHRITIS OF LEFT KNEE: Primary | ICD-10-CM

## 2022-04-29 DIAGNOSIS — E66.09 CLASS 2 OBESITY DUE TO EXCESS CALORIES WITH BODY MASS INDEX (BMI) OF 36.0 TO 36.9 IN ADULT, UNSPECIFIED WHETHER SERIOUS COMORBIDITY PRESENT: ICD-10-CM

## 2022-04-29 DIAGNOSIS — Z72.0 TOBACCO USE: ICD-10-CM

## 2022-04-29 PROCEDURE — 20610 DRAIN/INJ JOINT/BURSA W/O US: CPT | Performed by: PHYSICIAN ASSISTANT

## 2022-04-29 PROCEDURE — 99214 OFFICE O/P EST MOD 30 MIN: CPT | Performed by: PHYSICIAN ASSISTANT

## 2022-04-29 RX ADMIN — TRIAMCINOLONE ACETONIDE 40 MG: 40 INJECTION, SUSPENSION INTRA-ARTICULAR; INTRAMUSCULAR at 11:31

## 2022-04-29 RX ADMIN — LIDOCAINE HYDROCHLORIDE 4 ML: 10 INJECTION, SOLUTION EPIDURAL; INFILTRATION; INTRACAUDAL; PERINEURAL at 11:31

## 2022-05-05 RX ORDER — LIDOCAINE HYDROCHLORIDE 10 MG/ML
4 INJECTION, SOLUTION EPIDURAL; INFILTRATION; INTRACAUDAL; PERINEURAL
Status: COMPLETED | OUTPATIENT
Start: 2022-04-29 | End: 2022-04-29

## 2022-05-05 RX ORDER — TRIAMCINOLONE ACETONIDE 40 MG/ML
40 INJECTION, SUSPENSION INTRA-ARTICULAR; INTRAMUSCULAR
Status: COMPLETED | OUTPATIENT
Start: 2022-04-29 | End: 2022-04-29

## 2022-06-30 ENCOUNTER — OFFICE VISIT (OUTPATIENT)
Dept: ORTHOPEDIC SURGERY | Facility: CLINIC | Age: 47
End: 2022-06-30

## 2022-06-30 VITALS
BODY MASS INDEX: 36.88 KG/M2 | SYSTOLIC BLOOD PRESSURE: 122 MMHG | DIASTOLIC BLOOD PRESSURE: 72 MMHG | HEIGHT: 64 IN | WEIGHT: 216 LBS

## 2022-06-30 DIAGNOSIS — Z72.0 TOBACCO USE: ICD-10-CM

## 2022-06-30 DIAGNOSIS — M17.12 PRIMARY OSTEOARTHRITIS OF LEFT KNEE: Primary | ICD-10-CM

## 2022-06-30 PROCEDURE — 99214 OFFICE O/P EST MOD 30 MIN: CPT | Performed by: ORTHOPAEDIC SURGERY

## 2022-06-30 RX ORDER — MELOXICAM 7.5 MG/1
15 TABLET ORAL ONCE
Status: CANCELLED | OUTPATIENT
Start: 2022-06-30 | End: 2022-06-30

## 2022-06-30 RX ORDER — ACETAMINOPHEN 325 MG/1
1000 TABLET ORAL ONCE
Status: CANCELLED | OUTPATIENT
Start: 2022-06-30 | End: 2022-06-30

## 2022-06-30 RX ORDER — PREGABALIN 75 MG/1
75 CAPSULE ORAL ONCE
Status: CANCELLED | OUTPATIENT
Start: 2022-06-30 | End: 2022-06-30

## 2022-06-30 NOTE — PROGRESS NOTES
Orthopaedic Clinic Note: Knee New Patient    Chief Complaint   Patient presents with   • Left Knee - Pain, Initial Evaluation     Discuss TKA-Last Appt Mindi Jordan, PAC 22        HPI    Sully Yost is a 47 y.o. female who presents with left knee pain for 3 year(s). Onset atraumatic and gradual in nature. Pain is localized to the medial anterior aspect of the knee and is a 5/10 on the pain scale. Pain is described as aching. Associated symptoms include pain, popping and giving way/buckling. The pain is worse with walking, standing, climbing stairs, working, rising from seated position and any movement of the joint; resting, sitting and assistive device (cane/walker) make it better. Previous treatments have included: bracing, cane/walker, NSAIDS, oral steroids, viscosupplementation (last injection 21) and steroid injection (last injection 22) since symptom onset. Although some transient relief was reported with these interventions, these conservative measures have failed and symptoms have persisted. The patient is limited in daily activities and has had a significant decrease in quality of life as a result. She denies fevers, chills, or constitutional symptoms.    I have reviewed the following portions of the patient's history:History of Present Illness    Past Medical History:   Diagnosis Date   • Anxiety    • Arthritis of back    • Asthma    • Migraine       Past Surgical History:   Procedure Laterality Date   • CYST REMOVAL        Family History   Problem Relation Age of Onset   • Cancer Mother    • Diabetes Mother    • No Known Problems Father      Social History     Socioeconomic History   • Marital status:    Tobacco Use   • Smoking status: Former Smoker     Packs/day: 0.50     Types: Cigarettes     Quit date: 2022     Years since quittin.0   • Smokeless tobacco: Never Used   Substance and Sexual Activity   • Alcohol use: Yes     Comment: OCCASIONALLY   • Drug use:  "No   • Sexual activity: Defer      Current Outpatient Medications on File Prior to Visit   Medication Sig Dispense Refill   • albuterol sulfate  (90 Base) MCG/ACT inhaler Inhale 2 puffs Every 6 (Six) Hours As Needed.     • [DISCONTINUED] cyclobenzaprine (FLEXERIL) 10 MG tablet Take 1 tablet by mouth 3 (Three) Times a Day As Needed for Muscle Spasms. 15 tablet 0   • [DISCONTINUED] ibuprofen (ADVIL,MOTRIN) 600 MG tablet TAKE 1 TABLET BY MOUTH EVERY 6 HOURS FOR 7 DAYS AS NEEDED FOR PAIN WITH FOOD OR MILK     • [DISCONTINUED] meloxicam (MOBIC) 15 MG tablet Take 1 tablet by mouth Daily. 90 tablet 2     No current facility-administered medications on file prior to visit.      Allergies   Allergen Reactions   • Penicillins      \"MY WHOLE FAMILY IS ALLERGIC AND I DONT WANT TO TAKE A CHANCE\"         Review of Systems   Constitutional: Negative.    HENT: Negative.    Eyes: Negative.    Respiratory: Negative.    Cardiovascular: Negative.    Gastrointestinal: Negative.    Endocrine: Negative.    Genitourinary: Negative.    Musculoskeletal: Positive for arthralgias.   Skin: Negative.    Allergic/Immunologic: Negative.    Neurological: Negative.    Hematological: Negative.    Psychiatric/Behavioral: Negative.         The patient's Review of Systems was personally reviewed and confirmed as accurate.    The following portions of the patient's history were reviewed and updated as appropriate: allergies, current medications, past family history, past medical history, past social history, past surgical history and problem list.    Physical Exam  Blood pressure 122/72, height 162.6 cm (64.02\"), weight 98 kg (216 lb).    Body mass index is 37.06 kg/m².    GENERAL APPEARANCE: awake, alert & oriented x 3, in no acute distress and well developed, well nourished  PSYCH: normal affect  LUNGS:  breathing nonlabored  EYES: sclera anicteric  CARDIOVASCULAR: palpable dorsalis pedis, palpable posterior tibial bilaterally. Capillary refill " less than 2 seconds  EXTREMITIES: no clubbing, cyanosis  GAIT:  Antalgic            Right Lower Extremity Exam:   ----------  Hip Exam  ----------  FLEXION CONTRACTURE: None  FLEXION: 110 degrees  INTERNAL ROTATION: 20 degrees at 90 degrees of flexion   EXTERNAL ROTATION: 40 degrees at 90 degrees of flexion    PAIN WITH HIP MOTION: no  ----------  Knee Exam  ----------  ALIGNMENT: neutral, no varus or valgus deformity     RANGE OF MOTION:  Normal (0-120 degrees) with no extensor lag or flexion contracture  LIGAMENTOUS STABILITY:   stable to varus and valgus stress at terminal extension and 30 degrees without any evidence of laxity     STRENGTH:  5/5 knee flexion, extension. 5/5 ankle dorsiflexion and plantarflexion.     PAIN WITH PALPATION: denies tenderness to palpation about the knee, denies medial or lateral joint line pain  KNEE EFFUSION: no  PAIN WITH KNEE ROM: no  PATELLAR CREPITUS: yes, asymptomatic  SPECIAL EXAM FINDINGS:  Negative patellar compression    REFLEXES:  PATELLAR 2+/4  ACHILLES 2+/4    CLONUS: negative  STRAIGHT LEG TEST:   negative    SENSATION TO LIGHT TOUCH:  DEEP PERONEAL/SUPERFICIAL PERONEAL/SURAL/SAPHENOUS/TIBIAL:   intact    EDEMA:  no  ERYTHEMA:  no  WOUNDS/INCISIONS: no overlying skin problems.      Left Lower Extremity Exam:   ----------  Hip Exam  ----------  FLEXION CONTRACTURE: None  FLEXION: 110 degrees  INTERNAL ROTATION: 20 degrees at 90 degrees of flexion   EXTERNAL ROTATION: 40 degrees at 90 degrees of flexion    PAIN WITH HIP MOTION: no  ----------  Knee Exam  ----------  ALIGNMENT: severe varus, correctable to neutral    RANGE OF MOTION:  Decreased (5 - 115 degrees) with no extensor lag  LIGAMENTOUS STABILITY:   stable to varus and valgus stress at terminal extension and 30 degrees; retensioning of the MCL is appreciated with valgus stress at 30 degrees consistent with medial compartment degeneration     STRENGTH:  4/5 knee flexion, extension. 5/5 ankle dorsiflexion and  plantarflexion.     PAIN WITH PALPATION: medial joint line and anterior knee  KNEE EFFUSION: yes, trace effusion  PAIN WITH KNEE ROM: yes, medially  PATELLAR CREPITUS: yes, painful and symptomatic  SPECIAL EXAM FINDINGS:  none    REFLEXES:  PATELLAR 2+/4  ACHILLES 2+/4    CLONUS: no  STRAIGHT LEG TEST:   negative    SENSATION TO LIGHT TOUCH:  DEEP PERONEAL/SUPERFICIAL PERONEAL/SURAL/SAPHENOUS/TIBIAL:   intact    EDEMA:  no  ERYTHEMA:  no  WOUNDS/INCISIONS:  no      ______________________________________________________________________  ______________________________________________________________________    RADIOGRAPHIC FINDINGS:   Left knee radiographs from 4/29/2022 are personally interpreted.  Radiographs demonstrate tricompartmental degenerative changes with genu varum alignment and bone-on-bone articulation medial compartment    Assessment/Plan:   Diagnosis Plan   1. Primary osteoarthritis of left knee  Case Request    COVID PRE-OP / PRE-PROCEDURE SCREENING ORDER (NO ISOLATION) - Swab, Nasopharynx    Pregnancy, Urine - Urine, Clean Catch    CBC and Differential    Basic metabolic panel    Protime-INR    APTT    Hemoglobin A1c    ECG 12 Lead    Nicotine & Metabolite, Quant    Case Request   2. Tobacco use       The patient has clinical and radiographic evidence of end-stage left knee joint degeneration. Conservative measures have been tried for 3 months or longer, but have failed to adequately treat or improve the patient's symptoms. Pain is restricting the patient's daily activities as well as quality of life. The recommendation at this time is to proceed with a left total knee arthroplasty with the goal to improve patient function and pain. The risks, benefits, potential complications, and alternatives were discussed with the patient in detail. Risks included but were not limited to bleeding, infection, anesthesia risks, damage to neurovascular structures, osteolysis, aseptic loosening, instability,  dislocation, pain, continued pain, iatrogenic fracture, possible need for future surgery including the potential for amputation, blood clots, myocardial infarction, stroke, and death. Evelyn-operative blood management and the potential for blood transfusion were discussed with risks and options clearly outlined. Specific details of the surgical procedure, hospitalization, recovery, rehabilitation, and long-term precautions were also presented. Pre-operative teaching was provided. Implant/prosthesis selection was outlined, and the many options available were explained; the final choice will be made at the time of the procedure to match the anatomy and condition of the bone, ligaments, tendons, and muscles. Given this instruction, the patient elected to proceed with the left total knee arthroplasty. The patient will be seen by pre-admission testing for pre-operative optimization and risk assessment and will be scheduled for surgery once this is completed.    The patient is considered standard risk for DVT based on patient risk factors and will be placed on aspirin postoperatively for DVT prophylaxis.    Patient was counseled on maintaining nicotine free status prior to surgery.  She will be tested for nicotine levels prior to surgery and canceled if her nicotine levels demonstrate persistent nicotine intake.      Cayetano King MD  06/30/22  11:14 EDT

## 2022-09-01 ENCOUNTER — PRE-ADMISSION TESTING (OUTPATIENT)
Dept: PREADMISSION TESTING | Facility: HOSPITAL | Age: 47
End: 2022-09-01

## 2022-09-01 VITALS — WEIGHT: 220.35 LBS | BODY MASS INDEX: 36.71 KG/M2 | HEIGHT: 65 IN

## 2022-09-01 DIAGNOSIS — M17.12 PRIMARY OSTEOARTHRITIS OF LEFT KNEE: ICD-10-CM

## 2022-09-01 LAB
ANION GAP SERPL CALCULATED.3IONS-SCNC: 9 MMOL/L (ref 5–15)
APTT PPP: 32.9 SECONDS (ref 22–39)
BASOPHILS # BLD AUTO: 0.05 10*3/MM3 (ref 0–0.2)
BASOPHILS NFR BLD AUTO: 0.5 % (ref 0–1.5)
BUN SERPL-MCNC: 9 MG/DL (ref 6–20)
BUN/CREAT SERPL: 15.5 (ref 7–25)
CALCIUM SPEC-SCNC: 9.7 MG/DL (ref 8.6–10.5)
CHLORIDE SERPL-SCNC: 104 MMOL/L (ref 98–107)
CO2 SERPL-SCNC: 27 MMOL/L (ref 22–29)
CREAT SERPL-MCNC: 0.58 MG/DL (ref 0.57–1)
DEPRECATED RDW RBC AUTO: 44.8 FL (ref 37–54)
EGFRCR SERPLBLD CKD-EPI 2021: 111.1 ML/MIN/1.73
EOSINOPHIL # BLD AUTO: 0.49 10*3/MM3 (ref 0–0.4)
EOSINOPHIL NFR BLD AUTO: 4.5 % (ref 0.3–6.2)
ERYTHROCYTE [DISTWIDTH] IN BLOOD BY AUTOMATED COUNT: 15.8 % (ref 12.3–15.4)
GLUCOSE SERPL-MCNC: 115 MG/DL (ref 65–99)
HBA1C MFR BLD: 5.6 % (ref 4.8–5.6)
HCT VFR BLD AUTO: 37 % (ref 34–46.6)
HGB BLD-MCNC: 11.7 G/DL (ref 12–15.9)
IMM GRANULOCYTES # BLD AUTO: 0.04 10*3/MM3 (ref 0–0.05)
IMM GRANULOCYTES NFR BLD AUTO: 0.4 % (ref 0–0.5)
INR PPP: 1.05 (ref 0.84–1.13)
LYMPHOCYTES # BLD AUTO: 2.48 10*3/MM3 (ref 0.7–3.1)
LYMPHOCYTES NFR BLD AUTO: 22.6 % (ref 19.6–45.3)
MCH RBC QN AUTO: 24.8 PG (ref 26.6–33)
MCHC RBC AUTO-ENTMCNC: 31.6 G/DL (ref 31.5–35.7)
MCV RBC AUTO: 78.4 FL (ref 79–97)
MONOCYTES # BLD AUTO: 0.73 10*3/MM3 (ref 0.1–0.9)
MONOCYTES NFR BLD AUTO: 6.7 % (ref 5–12)
NEUTROPHILS NFR BLD AUTO: 65.3 % (ref 42.7–76)
NEUTROPHILS NFR BLD AUTO: 7.17 10*3/MM3 (ref 1.7–7)
NRBC BLD AUTO-RTO: 0 /100 WBC (ref 0–0.2)
PLATELET # BLD AUTO: 324 10*3/MM3 (ref 140–450)
PMV BLD AUTO: 9.7 FL (ref 6–12)
POTASSIUM SERPL-SCNC: 3.8 MMOL/L (ref 3.5–5.2)
PROTHROMBIN TIME: 13.6 SECONDS (ref 11.4–14.4)
QT INTERVAL: 460 MS
QTC INTERVAL: 443 MS
RBC # BLD AUTO: 4.72 10*6/MM3 (ref 3.77–5.28)
SODIUM SERPL-SCNC: 140 MMOL/L (ref 136–145)
WBC NRBC COR # BLD: 10.96 10*3/MM3 (ref 3.4–10.8)

## 2022-09-01 PROCEDURE — 36415 COLL VENOUS BLD VENIPUNCTURE: CPT

## 2022-09-01 PROCEDURE — 93010 ELECTROCARDIOGRAM REPORT: CPT | Performed by: INTERNAL MEDICINE

## 2022-09-01 PROCEDURE — 93005 ELECTROCARDIOGRAM TRACING: CPT

## 2022-09-01 PROCEDURE — 85730 THROMBOPLASTIN TIME PARTIAL: CPT

## 2022-09-01 PROCEDURE — 80048 BASIC METABOLIC PNL TOTAL CA: CPT

## 2022-09-01 PROCEDURE — 83036 HEMOGLOBIN GLYCOSYLATED A1C: CPT

## 2022-09-01 PROCEDURE — 85025 COMPLETE CBC W/AUTO DIFF WBC: CPT

## 2022-09-01 PROCEDURE — 85610 PROTHROMBIN TIME: CPT

## 2022-09-01 PROCEDURE — G0480 DRUG TEST DEF 1-7 CLASSES: HCPCS

## 2022-09-01 ASSESSMENT — KOOS JR
KOOS JR SCORE: 17
KOOS JR SCORE: 44.905

## 2022-09-01 NOTE — PAT
Patient instructed to drink 20 ounces of Gatorade and it needs to be completed 1 hour (for Main OR patients) or 2 hours (scheduled  section & [facility] patients) before given arrival time for procedure (NO RED Gatorade)    Patient verbalized understanding.    Patient to apply Chlorhexadine wipes  to surgical area (as instructed) the night before procedure and the AM of procedure. Wipes provided.    Prescription for Bactroban (if prescribed) and Chlorhexidine shower called into patient's pharmacy or [facility] pharmacy by patient's surgeon.  Reinforced with patient to  the prescription from applicable pharmacy if they haven't already.  Verbal and written instructions given regarding proper use of the Bactroban (if prescribed) and Chlorhexidine to patient and/or famlily during PAT visit. Patient/family also instructed to complete checklist and return it to Pre-op on the day of surgery.  Patient and/or family verbalized understanding.    Clean catch urinalysis not indicated because patient denied recent urinary frequency, urinary urgency, burning/pain upon urination, or flank pain. No recent UTIs.    Discussed with patient options for receiving total joint replacement education and assessed patient's ability and preference. Joint Replacement Guide given to patient during PAT visit since not received a copy within the last year. Encouraged patient/family to read guide thoroughly and notify PAT staff with any questions or concerns. Handout provided directing patient to links to watch online videos related to joint replacement surgery on the [facility] website. The handout gives detailed instructions for joining an online joint replacement class through Zoom or phone conference offered on . Patient agreed to participate by watching videos online. Patient verbalized understanding of instructions and to complete the online learning tool survey. Encouraged to share information with family and/or . An  overview of the joint replacement education was provided during the visit including general perioperative instructions that are routine for all surgical patients (PAT PASS, wipes, directions to pre-op, etc.).    Please collect urine HCG day of surgery, patient did not provide sample in PAT.    Consent signed.

## 2022-09-01 NOTE — DISCHARGE INSTRUCTIONS
The following information and instructions were given:    Do not eat, drink, smoke or chew gum after midnight the night before surgery. This includes no mints.  Take all routine, prescribed medications including heart and blood pressure medicines with a sip of water unless otherwise instructed by your physician.   Do NOT take diabetic medication unless instructed by your physician.    DO NOT shave for two days before your procedure.  Do not wear makeup.      DO NOT wear fingernail polish (gel/regular) and/or acrylic/artificial nails on the day of surgery. If you had a recent manicure and would rather not remove polish or artificial nails, the minimum requirement is that the polish/artificial nails must be removed from the middle finger on each hand.      If you are having surgery/procedure on an upper extremity, fingernail polish/artificial fingernails must be removed for surgery.  NO EXCEPTIONS.      If you are having surgery/procedure on a lower extremity, toenail polish on both extremities must be removed for surgery.  NO EXCEPTIONS.    Remove all jewelry (advise to go to jeweler if unable to remove).  Jewelry, especially rings, can no longer be taped for surgery.    Leave anything you consider valuable at home.    Leave your suitcase in the car until after your surgery if you are staying overnight.    Bring the following with you the day of your procedure (when applicable):       -Picture ID and insurance cards       -Co-pay/deductible required by insurance       -Medications in the original bottles (not a list) including all over-the-counter medications if not brought to PAT       -Copy of advance directive, living will or power of  documents if not brought to PAT       -CPAP or BIPAP mask and tubing (do not bring machine)       -Skin prep instruction(s) sheet       -PAT Pass    Educational handout or binder (joint replacements) related to procedure given to patient.  Educational handout also includes  general information related to the recovery that mentions signs and symptoms of infection and when to call the doctor.    When applicable, an ERAS handout was given to patient.    Respirex use and pneumonia prevention education provided in Pre Admission Testing general education video.    Information related to infection and hand hygiene mentioned in Pre Admission Testing general education video. Patient instructed to call their doctor if any of the following symptoms are noted during recovery:  Fever of 100.4 F or higher, incision that is warm or has increasing bleeding, redness or drainage.    DVT Prevention instructions given in general education video presentation during Pre Admission Testing appointment that stress the importance of ambulation to improve blood circulation.  Also encouraged patient to perform foot exercises when in bed and application of a sequential device may be applied to lower extremities to improve circulation.      Please apply Chlorhexidine wipes to surgical area (if instructed) the night before procedure and the AM of procedure and document date/time of applications on skin prep instruction sheet.

## 2022-09-12 LAB
COTININE SERPL-MCNC: <1 NG/ML
NICOTINE SERPL-MCNC: <1 NG/ML

## 2022-09-13 ENCOUNTER — ANESTHESIA EVENT (OUTPATIENT)
Dept: PERIOP | Facility: HOSPITAL | Age: 47
End: 2022-09-13

## 2022-09-13 RX ORDER — FAMOTIDINE 10 MG/ML
20 INJECTION, SOLUTION INTRAVENOUS ONCE
Status: CANCELLED | OUTPATIENT
Start: 2022-09-13 | End: 2022-09-13

## 2022-09-13 RX ORDER — SODIUM CHLORIDE 0.9 % (FLUSH) 0.9 %
10 SYRINGE (ML) INJECTION AS NEEDED
Status: CANCELLED | OUTPATIENT
Start: 2022-09-13

## 2022-09-13 RX ORDER — SODIUM CHLORIDE 0.9 % (FLUSH) 0.9 %
10 SYRINGE (ML) INJECTION EVERY 12 HOURS SCHEDULED
Status: CANCELLED | OUTPATIENT
Start: 2022-09-13

## 2022-09-14 ENCOUNTER — HOSPITAL ENCOUNTER (OUTPATIENT)
Facility: HOSPITAL | Age: 47
Discharge: HOME | End: 2022-09-15
Attending: ORTHOPAEDIC SURGERY | Admitting: ORTHOPAEDIC SURGERY

## 2022-09-14 ENCOUNTER — APPOINTMENT (OUTPATIENT)
Dept: GENERAL RADIOLOGY | Facility: HOSPITAL | Age: 47
End: 2022-09-14

## 2022-09-14 ENCOUNTER — ANESTHESIA (OUTPATIENT)
Dept: PERIOP | Facility: HOSPITAL | Age: 47
End: 2022-09-14

## 2022-09-14 ENCOUNTER — ANESTHESIA EVENT CONVERTED (OUTPATIENT)
Dept: ANESTHESIOLOGY | Facility: HOSPITAL | Age: 47
End: 2022-09-14

## 2022-09-14 DIAGNOSIS — Z96.652 STATUS POST TOTAL LEFT KNEE REPLACEMENT: Primary | ICD-10-CM

## 2022-09-14 DIAGNOSIS — M17.12 PRIMARY OSTEOARTHRITIS OF LEFT KNEE: ICD-10-CM

## 2022-09-14 DIAGNOSIS — Z74.09 IMPAIRED FUNCTIONAL MOBILITY, BALANCE, GAIT, AND ENDURANCE: ICD-10-CM

## 2022-09-14 PROBLEM — J45.909 ASTHMA: Status: ACTIVE | Noted: 2022-09-14

## 2022-09-14 LAB
B-HCG UR QL: NEGATIVE
EXPIRATION DATE: NORMAL
GLUCOSE BLDC GLUCOMTR-MCNC: 89 MG/DL (ref 70–130)
INTERNAL NEGATIVE CONTROL: NORMAL
INTERNAL POSITIVE CONTROL: NORMAL
Lab: NORMAL
POTASSIUM SERPL-SCNC: 4 MMOL/L (ref 3.5–5.2)

## 2022-09-14 PROCEDURE — 97116 GAIT TRAINING THERAPY: CPT

## 2022-09-14 PROCEDURE — 25010000002 PROPOFOL 10 MG/ML EMULSION: Performed by: NURSE ANESTHETIST, CERTIFIED REGISTERED

## 2022-09-14 PROCEDURE — 25010000002 CEFAZOLIN IN DEXTROSE 2-4 GM/100ML-% SOLUTION: Performed by: ORTHOPAEDIC SURGERY

## 2022-09-14 PROCEDURE — 25010000002 DEXAMETHASONE PER 1 MG: Performed by: NURSE ANESTHETIST, CERTIFIED REGISTERED

## 2022-09-14 PROCEDURE — 25010000002 ROPIVACAINE PER 1 MG: Performed by: ORTHOPAEDIC SURGERY

## 2022-09-14 PROCEDURE — C1776 JOINT DEVICE (IMPLANTABLE): HCPCS | Performed by: ORTHOPAEDIC SURGERY

## 2022-09-14 PROCEDURE — 84132 ASSAY OF SERUM POTASSIUM: CPT | Performed by: ANESTHESIOLOGY

## 2022-09-14 PROCEDURE — 27447 TOTAL KNEE ARTHROPLASTY: CPT | Performed by: ORTHOPAEDIC SURGERY

## 2022-09-14 PROCEDURE — G0378 HOSPITAL OBSERVATION PER HR: HCPCS

## 2022-09-14 PROCEDURE — 73560 X-RAY EXAM OF KNEE 1 OR 2: CPT

## 2022-09-14 PROCEDURE — 97110 THERAPEUTIC EXERCISES: CPT

## 2022-09-14 PROCEDURE — 25010000002 KETOROLAC TROMETHAMINE PER 15 MG: Performed by: ORTHOPAEDIC SURGERY

## 2022-09-14 PROCEDURE — 25010000002 ONDANSETRON PER 1 MG: Performed by: NURSE ANESTHETIST, CERTIFIED REGISTERED

## 2022-09-14 PROCEDURE — 25010000002 ROPIVACAINE PER 1 MG

## 2022-09-14 PROCEDURE — 81025 URINE PREGNANCY TEST: CPT | Performed by: ANESTHESIOLOGY

## 2022-09-14 PROCEDURE — 27447 TOTAL KNEE ARTHROPLASTY: CPT | Performed by: PHYSICIAN ASSISTANT

## 2022-09-14 PROCEDURE — 82962 GLUCOSE BLOOD TEST: CPT

## 2022-09-14 PROCEDURE — S0260 H&P FOR SURGERY: HCPCS | Performed by: PHYSICIAN ASSISTANT

## 2022-09-14 PROCEDURE — 25010000002 MORPHINE PER 10 MG: Performed by: ORTHOPAEDIC SURGERY

## 2022-09-14 PROCEDURE — C1755 CATHETER, INTRASPINAL: HCPCS | Performed by: ORTHOPAEDIC SURGERY

## 2022-09-14 PROCEDURE — 97161 PT EVAL LOW COMPLEX 20 MIN: CPT

## 2022-09-14 DEVICE — PAT TRIATH TRITANIUM 32X36X10MM: Type: IMPLANTABLE DEVICE | Site: KNEE | Status: FUNCTIONAL

## 2022-09-14 DEVICE — DEV CONTRL TISS STRATAFIX SPIRAL PDO BIDIR 1 36X36CM: Type: IMPLANTABLE DEVICE | Site: KNEE | Status: FUNCTIONAL

## 2022-09-14 DEVICE — BASEPLT TIB TRIATH TRITANIUM SZ4: Type: IMPLANTABLE DEVICE | Site: KNEE | Status: FUNCTIONAL

## 2022-09-14 DEVICE — INSRT TIB/KN TRIATHLON CONDY/STBL X3 SZ4 11MM: Type: IMPLANTABLE DEVICE | Site: KNEE | Status: FUNCTIONAL

## 2022-09-14 DEVICE — CAP TOTAL KN CMTLS 4 PC: Type: IMPLANTABLE DEVICE | Site: KNEE | Status: FUNCTIONAL

## 2022-09-14 DEVICE — COMP FEM TRIATH CR CMTLS SZ4 LT: Type: IMPLANTABLE DEVICE | Site: KNEE | Status: FUNCTIONAL

## 2022-09-14 RX ORDER — DEXAMETHASONE SODIUM PHOSPHATE 4 MG/ML
INJECTION, SOLUTION INTRA-ARTICULAR; INTRALESIONAL; INTRAMUSCULAR; INTRAVENOUS; SOFT TISSUE AS NEEDED
Status: DISCONTINUED | OUTPATIENT
Start: 2022-09-14 | End: 2022-09-14 | Stop reason: SURG

## 2022-09-14 RX ORDER — GLYCOPYRROLATE 0.2 MG/ML
INJECTION INTRAMUSCULAR; INTRAVENOUS AS NEEDED
Status: DISCONTINUED | OUTPATIENT
Start: 2022-09-14 | End: 2022-09-14 | Stop reason: SURG

## 2022-09-14 RX ORDER — OXYCODONE HYDROCHLORIDE 5 MG/1
10 TABLET ORAL EVERY 4 HOURS PRN
Status: DISCONTINUED | OUTPATIENT
Start: 2022-09-14 | End: 2022-09-15 | Stop reason: HOSPADM

## 2022-09-14 RX ORDER — NALOXONE HCL 0.4 MG/ML
0.1 VIAL (ML) INJECTION
Status: DISCONTINUED | OUTPATIENT
Start: 2022-09-14 | End: 2022-09-15 | Stop reason: HOSPADM

## 2022-09-14 RX ORDER — MELOXICAM 15 MG/1
15 TABLET ORAL ONCE
Status: COMPLETED | OUTPATIENT
Start: 2022-09-14 | End: 2022-09-14

## 2022-09-14 RX ORDER — ACETAMINOPHEN 500 MG
1000 TABLET ORAL EVERY 8 HOURS
Status: DISCONTINUED | OUTPATIENT
Start: 2022-09-14 | End: 2022-09-15 | Stop reason: HOSPADM

## 2022-09-14 RX ORDER — ONDANSETRON 2 MG/ML
INJECTION INTRAMUSCULAR; INTRAVENOUS AS NEEDED
Status: DISCONTINUED | OUTPATIENT
Start: 2022-09-14 | End: 2022-09-14 | Stop reason: SURG

## 2022-09-14 RX ORDER — BUPIVACAINE HYDROCHLORIDE 2.5 MG/ML
INJECTION, SOLUTION EPIDURAL; INFILTRATION; INTRACAUDAL
Status: DISCONTINUED | OUTPATIENT
Start: 2022-09-14 | End: 2022-09-14 | Stop reason: SURG

## 2022-09-14 RX ORDER — EPHEDRINE SULFATE 50 MG/ML
INJECTION, SOLUTION INTRAVENOUS AS NEEDED
Status: DISCONTINUED | OUTPATIENT
Start: 2022-09-14 | End: 2022-09-14 | Stop reason: SURG

## 2022-09-14 RX ORDER — TRANEXAMIC ACID 10 MG/ML
1000 INJECTION, SOLUTION INTRAVENOUS ONCE
Status: COMPLETED | OUTPATIENT
Start: 2022-09-14 | End: 2022-09-14

## 2022-09-14 RX ORDER — SODIUM CHLORIDE, SODIUM LACTATE, POTASSIUM CHLORIDE, CALCIUM CHLORIDE 600; 310; 30; 20 MG/100ML; MG/100ML; MG/100ML; MG/100ML
9 INJECTION, SOLUTION INTRAVENOUS CONTINUOUS
Status: DISCONTINUED | OUTPATIENT
Start: 2022-09-14 | End: 2022-09-15 | Stop reason: HOSPADM

## 2022-09-14 RX ORDER — MAGNESIUM HYDROXIDE 1200 MG/15ML
LIQUID ORAL AS NEEDED
Status: DISCONTINUED | OUTPATIENT
Start: 2022-09-14 | End: 2022-09-14 | Stop reason: HOSPADM

## 2022-09-14 RX ORDER — ESMOLOL HYDROCHLORIDE 10 MG/ML
INJECTION INTRAVENOUS AS NEEDED
Status: DISCONTINUED | OUTPATIENT
Start: 2022-09-14 | End: 2022-09-14 | Stop reason: SURG

## 2022-09-14 RX ORDER — ALBUTEROL SULFATE 2.5 MG/3ML
2.5 SOLUTION RESPIRATORY (INHALATION) EVERY 6 HOURS PRN
Status: DISCONTINUED | OUTPATIENT
Start: 2022-09-14 | End: 2022-09-15 | Stop reason: HOSPADM

## 2022-09-14 RX ORDER — ROPIVACAINE HYDROCHLORIDE 2 MG/ML
INJECTION, SOLUTION EPIDURAL; INFILTRATION; PERINEURAL CONTINUOUS
Status: DISCONTINUED | OUTPATIENT
Start: 2022-09-14 | End: 2022-09-15 | Stop reason: HOSPADM

## 2022-09-14 RX ORDER — LIDOCAINE HYDROCHLORIDE 10 MG/ML
0.5 INJECTION, SOLUTION EPIDURAL; INFILTRATION; INTRACAUDAL; PERINEURAL ONCE AS NEEDED
Status: COMPLETED | OUTPATIENT
Start: 2022-09-14 | End: 2022-09-14

## 2022-09-14 RX ORDER — MELOXICAM 15 MG/1
15 TABLET ORAL DAILY
Status: DISCONTINUED | OUTPATIENT
Start: 2022-09-15 | End: 2022-09-15 | Stop reason: HOSPADM

## 2022-09-14 RX ORDER — BUPIVACAINE HYDROCHLORIDE 5 MG/ML
INJECTION, SOLUTION PERINEURAL
Status: COMPLETED | OUTPATIENT
Start: 2022-09-14 | End: 2022-09-14

## 2022-09-14 RX ORDER — OXYCODONE HYDROCHLORIDE 5 MG/1
5 TABLET ORAL EVERY 4 HOURS PRN
Status: DISCONTINUED | OUTPATIENT
Start: 2022-09-14 | End: 2022-09-15 | Stop reason: HOSPADM

## 2022-09-14 RX ORDER — FAMOTIDINE 20 MG/1
20 TABLET, FILM COATED ORAL ONCE
Status: COMPLETED | OUTPATIENT
Start: 2022-09-14 | End: 2022-09-14

## 2022-09-14 RX ORDER — ONDANSETRON 2 MG/ML
4 INJECTION INTRAMUSCULAR; INTRAVENOUS EVERY 6 HOURS PRN
Status: DISCONTINUED | OUTPATIENT
Start: 2022-09-14 | End: 2022-09-15 | Stop reason: HOSPADM

## 2022-09-14 RX ORDER — MIDAZOLAM HYDROCHLORIDE 1 MG/ML
1 INJECTION INTRAMUSCULAR; INTRAVENOUS
Status: DISCONTINUED | OUTPATIENT
Start: 2022-09-14 | End: 2022-09-14 | Stop reason: HOSPADM

## 2022-09-14 RX ORDER — SODIUM CHLORIDE 9 MG/ML
100 INJECTION, SOLUTION INTRAVENOUS CONTINUOUS
Status: DISCONTINUED | OUTPATIENT
Start: 2022-09-14 | End: 2022-09-15 | Stop reason: HOSPADM

## 2022-09-14 RX ORDER — FENTANYL CITRATE 50 UG/ML
50 INJECTION, SOLUTION INTRAMUSCULAR; INTRAVENOUS
Status: DISCONTINUED | OUTPATIENT
Start: 2022-09-14 | End: 2022-09-14

## 2022-09-14 RX ORDER — BUPIVACAINE HCL/0.9 % NACL/PF 0.125 %
PLASTIC BAG, INJECTION (ML) EPIDURAL AS NEEDED
Status: DISCONTINUED | OUTPATIENT
Start: 2022-09-14 | End: 2022-09-14 | Stop reason: SURG

## 2022-09-14 RX ORDER — ACETAMINOPHEN 500 MG
1000 TABLET ORAL ONCE
Status: COMPLETED | OUTPATIENT
Start: 2022-09-14 | End: 2022-09-14

## 2022-09-14 RX ORDER — ONDANSETRON 4 MG/1
4 TABLET, FILM COATED ORAL EVERY 6 HOURS PRN
Status: DISCONTINUED | OUTPATIENT
Start: 2022-09-14 | End: 2022-09-15 | Stop reason: HOSPADM

## 2022-09-14 RX ORDER — CEFAZOLIN SODIUM 2 G/100ML
2 INJECTION, SOLUTION INTRAVENOUS EVERY 8 HOURS
Status: COMPLETED | OUTPATIENT
Start: 2022-09-14 | End: 2022-09-15

## 2022-09-14 RX ORDER — CEFAZOLIN SODIUM 2 G/100ML
2 INJECTION, SOLUTION INTRAVENOUS ONCE
Status: COMPLETED | OUTPATIENT
Start: 2022-09-14 | End: 2022-09-14

## 2022-09-14 RX ORDER — DEXMEDETOMIDINE HYDROCHLORIDE 100 UG/ML
INJECTION, SOLUTION INTRAVENOUS AS NEEDED
Status: DISCONTINUED | OUTPATIENT
Start: 2022-09-14 | End: 2022-09-14 | Stop reason: SURG

## 2022-09-14 RX ORDER — LIDOCAINE HYDROCHLORIDE 10 MG/ML
INJECTION, SOLUTION EPIDURAL; INFILTRATION; INTRACAUDAL; PERINEURAL AS NEEDED
Status: DISCONTINUED | OUTPATIENT
Start: 2022-09-14 | End: 2022-09-14 | Stop reason: SURG

## 2022-09-14 RX ORDER — PROPOFOL 10 MG/ML
VIAL (ML) INTRAVENOUS AS NEEDED
Status: DISCONTINUED | OUTPATIENT
Start: 2022-09-14 | End: 2022-09-14 | Stop reason: SURG

## 2022-09-14 RX ORDER — ASPIRIN 81 MG/1
81 TABLET ORAL EVERY 12 HOURS SCHEDULED
Status: DISCONTINUED | OUTPATIENT
Start: 2022-09-15 | End: 2022-09-15 | Stop reason: HOSPADM

## 2022-09-14 RX ORDER — PREGABALIN 75 MG/1
75 CAPSULE ORAL ONCE
Status: COMPLETED | OUTPATIENT
Start: 2022-09-14 | End: 2022-09-14

## 2022-09-14 RX ORDER — SODIUM CHLORIDE 0.9 % (FLUSH) 0.9 %
3-10 SYRINGE (ML) INJECTION AS NEEDED
Status: DISCONTINUED | OUTPATIENT
Start: 2022-09-14 | End: 2022-09-15 | Stop reason: HOSPADM

## 2022-09-14 RX ORDER — SODIUM CHLORIDE 0.9 % (FLUSH) 0.9 %
3 SYRINGE (ML) INJECTION EVERY 12 HOURS SCHEDULED
Status: DISCONTINUED | OUTPATIENT
Start: 2022-09-14 | End: 2022-09-15 | Stop reason: HOSPADM

## 2022-09-14 RX ADMIN — MUPIROCIN 1 APPLICATION: 20 OINTMENT TOPICAL at 06:21

## 2022-09-14 RX ADMIN — Medication 100 MCG: at 08:45

## 2022-09-14 RX ADMIN — ESMOLOL HYDROCHLORIDE 50 MG: 10 INJECTION, SOLUTION INTRAVENOUS at 09:23

## 2022-09-14 RX ADMIN — ONDANSETRON 4 MG: 2 INJECTION INTRAMUSCULAR; INTRAVENOUS at 08:55

## 2022-09-14 RX ADMIN — CEFAZOLIN SODIUM 2 G: 2 INJECTION, SOLUTION INTRAVENOUS at 15:17

## 2022-09-14 RX ADMIN — Medication 100 MCG: at 08:42

## 2022-09-14 RX ADMIN — SODIUM CHLORIDE 100 ML/HR: 9 INJECTION, SOLUTION INTRAVENOUS at 12:08

## 2022-09-14 RX ADMIN — MELOXICAM 15 MG: 15 TABLET ORAL at 06:21

## 2022-09-14 RX ADMIN — LIDOCAINE HYDROCHLORIDE 0.5 ML: 10 INJECTION, SOLUTION EPIDURAL; INFILTRATION; INTRACAUDAL; PERINEURAL at 06:45

## 2022-09-14 RX ADMIN — PROPOFOL 50 MG: 10 INJECTION, EMULSION INTRAVENOUS at 07:25

## 2022-09-14 RX ADMIN — LIDOCAINE HYDROCHLORIDE 25 MG: 10 INJECTION, SOLUTION EPIDURAL; INFILTRATION; INTRACAUDAL; PERINEURAL at 07:44

## 2022-09-14 RX ADMIN — PREGABALIN 75 MG: 75 CAPSULE ORAL at 06:21

## 2022-09-14 RX ADMIN — Medication 200 MCG: at 08:11

## 2022-09-14 RX ADMIN — Medication 200 MCG: at 08:06

## 2022-09-14 RX ADMIN — Medication 100 MCG: at 08:21

## 2022-09-14 RX ADMIN — DEXAMETHASONE SODIUM PHOSPHATE 8 MG: 4 INJECTION, SOLUTION INTRA-ARTICULAR; INTRALESIONAL; INTRAMUSCULAR; INTRAVENOUS; SOFT TISSUE at 07:46

## 2022-09-14 RX ADMIN — EPHEDRINE SULFATE 10 MG: 50 INJECTION INTRAVENOUS at 09:00

## 2022-09-14 RX ADMIN — PROPOFOL 25 MG: 10 INJECTION, EMULSION INTRAVENOUS at 07:36

## 2022-09-14 RX ADMIN — CEFAZOLIN SODIUM 2 G: 2 INJECTION, SOLUTION INTRAVENOUS at 07:38

## 2022-09-14 RX ADMIN — LIDOCAINE HYDROCHLORIDE 25 MG: 10 INJECTION, SOLUTION EPIDURAL; INFILTRATION; INTRACAUDAL; PERINEURAL at 07:36

## 2022-09-14 RX ADMIN — GLYCOPYRROLATE 0.2 MCG: 0.2 INJECTION INTRAMUSCULAR; INTRAVENOUS at 08:03

## 2022-09-14 RX ADMIN — PROPOFOL 100 MCG/KG/MIN: 10 INJECTION, EMULSION INTRAVENOUS at 07:33

## 2022-09-14 RX ADMIN — LIDOCAINE HYDROCHLORIDE 50 MG: 10 INJECTION, SOLUTION EPIDURAL; INFILTRATION; INTRACAUDAL; PERINEURAL at 07:25

## 2022-09-14 RX ADMIN — ROPIVACAINE HYDROCHLORIDE: 2 INJECTION, SOLUTION EPIDURAL; INFILTRATION; PERINEURAL at 09:50

## 2022-09-14 RX ADMIN — PROPOFOL 25 MG: 10 INJECTION, EMULSION INTRAVENOUS at 07:44

## 2022-09-14 RX ADMIN — Medication 100 MCG: at 08:32

## 2022-09-14 RX ADMIN — Medication 100 MCG: at 08:47

## 2022-09-14 RX ADMIN — TRANEXAMIC ACID 1000 MG: 10 INJECTION, SOLUTION INTRAVENOUS at 08:47

## 2022-09-14 RX ADMIN — ACETAMINOPHEN 1000 MG: 500 TABLET, FILM COATED ORAL at 06:21

## 2022-09-14 RX ADMIN — GLYCOPYRROLATE 0.2 MCG: 0.2 INJECTION INTRAMUSCULAR; INTRAVENOUS at 08:28

## 2022-09-14 RX ADMIN — Medication 150 MCG: at 07:50

## 2022-09-14 RX ADMIN — SODIUM CHLORIDE, POTASSIUM CHLORIDE, SODIUM LACTATE AND CALCIUM CHLORIDE: 600; 310; 30; 20 INJECTION, SOLUTION INTRAVENOUS at 08:47

## 2022-09-14 RX ADMIN — DEXMEDETOMIDINE HYDROCHLORIDE 8 MCG: 100 INJECTION, SOLUTION INTRAVENOUS at 08:11

## 2022-09-14 RX ADMIN — Medication 150 MCG: at 08:16

## 2022-09-14 RX ADMIN — SODIUM CHLORIDE, POTASSIUM CHLORIDE, SODIUM LACTATE AND CALCIUM CHLORIDE 9 ML/HR: 600; 310; 30; 20 INJECTION, SOLUTION INTRAVENOUS at 06:46

## 2022-09-14 RX ADMIN — Medication 200 MCG: at 08:36

## 2022-09-14 RX ADMIN — Medication 100 MCG: at 08:50

## 2022-09-14 RX ADMIN — Medication 200 MCG: at 08:01

## 2022-09-14 RX ADMIN — DEXMEDETOMIDINE HYDROCHLORIDE 8 MCG: 100 INJECTION, SOLUTION INTRAVENOUS at 08:31

## 2022-09-14 RX ADMIN — ACETAMINOPHEN 1000 MG: 500 TABLET, FILM COATED ORAL at 15:17

## 2022-09-14 RX ADMIN — BUPIVACAINE HYDROCHLORIDE 15 ML: 2.5 INJECTION, SOLUTION EPIDURAL; INFILTRATION; INTRACAUDAL at 09:38

## 2022-09-14 RX ADMIN — Medication 100 MCG: at 07:56

## 2022-09-14 RX ADMIN — Medication 200 MCG: at 08:28

## 2022-09-14 RX ADMIN — TRANEXAMIC ACID 1000 MG: 10 INJECTION, SOLUTION INTRAVENOUS at 07:44

## 2022-09-14 RX ADMIN — ACETAMINOPHEN 1000 MG: 500 TABLET, FILM COATED ORAL at 20:44

## 2022-09-14 RX ADMIN — DEXMEDETOMIDINE HYDROCHLORIDE 8 MCG: 100 INJECTION, SOLUTION INTRAVENOUS at 09:11

## 2022-09-14 RX ADMIN — OXYCODONE 5 MG: 5 TABLET ORAL at 20:45

## 2022-09-14 RX ADMIN — BUPIVACAINE HYDROCHLORIDE 2 ML: 5 INJECTION, SOLUTION PERINEURAL at 07:29

## 2022-09-14 RX ADMIN — DEXMEDETOMIDINE HYDROCHLORIDE 8 MCG: 100 INJECTION, SOLUTION INTRAVENOUS at 09:22

## 2022-09-14 RX ADMIN — FAMOTIDINE 20 MG: 20 TABLET ORAL at 06:21

## 2022-09-14 RX ADMIN — EPHEDRINE SULFATE 10 MG: 50 INJECTION INTRAVENOUS at 09:08

## 2022-09-14 RX ADMIN — DEXMEDETOMIDINE HYDROCHLORIDE 8 MCG: 100 INJECTION, SOLUTION INTRAVENOUS at 08:51

## 2022-09-14 NOTE — ANESTHESIA PROCEDURE NOTES
Spinal Block    Pre-sedation assessment completed: 9/14/2022 7:25 AM    Patient reassessed immediately prior to procedure    Patient location during procedure: OR  Start Time: 9/14/2022 7:25 AM  Indication:at surgeon's request  Performed By  CRNA/CAA: Elvis Rueda, PARMJITNA: Christiana Davis SRNA  Preanesthetic Checklist  Completed: patient identified, IV checked, site marked, risks and benefits discussed, surgical consent, monitors and equipment checked, pre-op evaluation and timeout performed  Spinal Block Prep:  Patient Position:sitting  Sterile Tech:cap, gloves, sterile barriers and mask  Prep:Chloraprep  Patient Monitoring:blood pressure monitoring, continuous pulse oximetry and EKG  Spinal Block Procedure  Approach:midline  Guidance:landmark technique and palpation technique  Location:L4-L5  Needle Type:Sprotte  Needle Gauge:25 G  Placement of Spinal needle event:cerebrospinal fluid aspirated  Paresthesia: no  Fluid Appearance:clear  Medications: bupivacaine (MARCAINE) 0.5 % injection, 2 mL  Med Administered at 9/14/2022 7:29 AM   Post Assessment  Patient Tolerance:patient tolerated the procedure well with no apparent complications  Complications no  Additional Notes  Procedure:  Pt assisted to sitting position, with legs in position of comfort over side of bed.  Pt. instructed in optimal spine presentation, the spine was prepped/ Draped and the skin at insertion site was anesthetized with 1% Lidocaine 2 ml.  The spinal needle was then advanced until CSF flow was obtained and LA was injected:

## 2022-09-14 NOTE — BRIEF OP NOTE
TOTAL KNEE ARTHROPLASTY  Progress Note    Sully Yost  9/14/2022    Pre-op Diagnosis:   Primary osteoarthritis of left knee [M17.12]       Post-Op Diagnosis Codes:     * Primary osteoarthritis of left knee [M17.12]    Procedure/CPT® Codes:  [place] TOTAL KNEE ARTHROPLASTY [33382]      Procedure(s):  LEFT TOTAL KNEE ARTHROPLASTY    Surgical Approach: Knee Medial Parapatellar      Surgeon(s):  Cayetano King MD    Anesthesia: Spinal    Staff:   Circulator: Jillian Campbell RN  Scrub Person: Nayana Call  Vendor Representative: Travis Coleman Brad  Nursing Assistant: Velvet Vasques PCT; Laurita Lester PCT  Assistant: Mindi Jordan PA-C  Assistant: Mindi Jordan PA-C      Estimated Blood Loss: 25 mL    Urine Voided: * No values recorded between 9/14/2022  7:18 AM and 9/14/2022  8:50 AM *    Specimens:                None          Drains: * No LDAs found *    Findings: Advanced tricompartmental osteoarthritis genu varum alignment        Complications: None apparent    Assistant: Mindi Jordan PA-C  was responsible for performing the following activities: Retraction, Suction, Irrigation, Suturing, Closing and Placing Dressing and their skilled assistance was necessary for the success of this case.    Cayetano King MD     Date: 9/14/2022  Time: 09:15 EDT

## 2022-09-14 NOTE — ANESTHESIA PREPROCEDURE EVALUATION
Anesthesia Evaluation     Patient summary reviewed and Nursing notes reviewed   no history of anesthetic complications:  NPO Solid Status: > 8 hours  NPO Liquid Status: > 2 hours           Airway   Mallampati: II  TM distance: >3 FB  Neck ROM: full  No difficulty expected  Dental - normal exam     Pulmonary - normal exam    breath sounds clear to auscultation  (+) a smoker (quit x 5 months) Former, asthma,sleep apnea (possible, no sleep study verification),   Cardiovascular     ECG reviewed  Rhythm: regular  Rate: normal    (+) valvular problems/murmurs (asymptomatic) murmur, murmur,       Neuro/Psych- negative ROS  GI/Hepatic/Renal/Endo    (+) obesity,  GERD,      Musculoskeletal     Abdominal    Substance History      OB/GYN          Other   arthritis,                      Anesthesia Plan    ASA 2     spinal     (Left adductor canal block/catheter with Infu-system pump for post-operative analgesia per request of Dr. King.  Time out performed to verify patient, surgeon, and surgical site.)  intravenous induction     Anesthetic plan, risks, benefits, and alternatives have been provided, discussed and informed consent has been obtained with: patient.    Plan discussed with CRNA.        CODE STATUS:

## 2022-09-14 NOTE — DISCHARGE INSTRUCTIONS
"Do NOT drive until you have been cleared to do so.   Do NOT place a pillow under directly under the knee.       DISCHARGE INSTRUCTIONS   Dr. King     Total Knee Replacement/ Partial (Uni) Knee Replacement     Wound Care   1) Keep wound / incision area clean and dry.   2) Dressing to remain in place until post-operative day 7. Upon dressing removal, assess for wound drainage. If no drainage is present, keep wound / incision area open to air as much as possible. If drainage is present, place sterile dressing to cover wound and assess daily. If drainage continues to occur after post-operative day 14, call the office for an urgent appointment. (You should be seen in the clinic within 1-2 days of calling). DO NOT REMOVE SUTURES (IF PRESENT) UNDER ANY CIRCUMSTANCES PRIOR TO FOLLOW UP APPOINTMENT.  3) No baths or swimming until otherwise instructed. The wound must remain dry for 10 days after surgery. After 10 days, you may begin to shower only if no drainage is present. No submerging the wound under standing water until cleared by your physician (no baths, hot tubs, swimming pools, etc). Sponge baths are the best way to perform personal hygiene while at the same time protecting the wound from moisture.   4) Prior to showering, the wound must remain dry for 72 consecutive hours (no drainage whatsoever) prior to showering. If the wound drains or spots, the clock \"resets\" - make sure the wound has been drainage-free for 72 consecutive hours.   5) Once you are allowed to get the wound wet, please use gentle soap to wash the wound area. DO NOT aggressively scrub the wound with a washcloth or bath sponge. Please visually inspect your wound(s) at least once daily. If the wound(s) are in a difficult to see location, please use a mirror or have someone else assist with visual inspection.   6) No scrubbing the wound. You may \"pad dry\" the wound, but do not rub, as this may open up the wound and pre-dispose to wound infection. " "  7) Do not apply lotions or creams to incision site, unless instructed otherwise.   8) Observe for redness, swelling, or drainage. Please call the clinic immediately if you have fevers, chills with warmth/redness surrounding wound site or if you notice pus drainage from the wound site     Activity   No heavy lifting objects greater than 10 pounds.   No driving while on narcotic pain medication.   No submerging wound under standing water (pool, bath tub, etc.) until otherwise instructed.   You may be protected weightbearing as tolerated on your operative (left lower) extremity   Use crutches or a walker for ambulation.   Wean as appropriate per physical therapist's discretion.   Do not sleep with a pillow behind your knee. You may sleep with a pillow behind your Achilles or foot. This will prevent your knee from getting stiff in the flexed (\"bent\") position and will encourage full extension (leg straightening).   Be vigilant in terms of working on full knee extension and flexion. Your goal should be 0 to 90 degrees by 2 weeks post-op - MINIMUM!   Knee range of motion as tolerated.    Blood Clot Prophylaxis   (Aspirin vs. Lovenox vs. Eliquis administration is determined by your surgeon and tailored to your specific risk profile. You will be discharged with one of these medications.) You will need to complete a total 4 week course of enteric coated aspirin 325 mg (or 81mg) twice daily or Eliquis 2.5mg twice daily, in order to minimize your risk of blood clots following surgery. You will be supplied with a prescription to obtain this. Alternatively, you will need to compete a total 2 week course of Lovenox after surgery (followed by a 2 week course of aspirin twice daily), in order to minimize the risk of blood clots following surgery. Lovenox requires a single shot in the abdomen, to be taken once daily. You will be supplied with the prescription to obtain this. Prior to your discharge from the hospital, the nursing " "staff will instruct you on self-administration of the Lovenox, if you will be returning directly home from the hospital.     Discharge Pain Medications   You will be given a prescription for pain medication. You should start taking this the same day after your surgery. Wean off as tolerated. Do not wait to take the pain medication until the pain is severe, as it will be difficult to \"catch up\" once this occurs. The pain medication usually reaches its full effect ~1 hour after ingesting. If you have been sent home on Colace, this medication should be taken until you are off all narcotic (i.e. Oxycodone, etc) pain medications, in order to prevent constipation. If you have been sent home with a combination of oxycodone and Tylenol, please take Tylenol as scheduled.  You must be careful not to exceed 4,000mg (4 grams) of Tylenol. The oxycodone is to be taken as needed for \"breakthrough\" pain.  Some common side effects of the narcotic pain medications include nausea and itching. Benadryl is a great over the counter medication that helps calm your stomach, decreases your anxiety levels, and minimizes the itching. You can easily purchase this at your local pharmacy as an over-the-counter medication. Please abide by the instructions as printed on the bottle. If your nausea persists, make sure to take small amounts of crackers or other lighter foods.     Follow-Up   Follow-up with Dr. King's office in 3 weeks from the surgery date for a post-operative evaluation. Have the following xrays done upon arrival to the follow-up appointment: 3 views of operative knee. Please call Dr. King's office at [phone] for orthopaedic appointments or questions.InfuBLOCK - Patient Information    What is a pain pump?  InfuBLOCK is a postoperative, non-narcotic pain relief system that delivers local anesthetic to or near the surgical site. This is a pain minimizing therapy that delivers an anesthetic (numbing) medicine to the " nerve.    The InfuBLOCK pain pump will continuously deliver a local anesthetic medication to block the pain in the area of your procedure.    Where can I find information about my pain pump?           For more information about your pain pump, scan the QR code.  For additional patient resources, visit [email or web address].                                                                                             The [employer] Nursing Hotline is Here for You 24/7.     Call [phone] for Assistance.  While your physician is your primary source for information about your treatment., there may be times during your treatment that you need assistance with your infusion pump. Our team of compassionate and knowledgeable Registered Nursed (RN) is here to assist every step of the way.    Answers to questions about your infusion pump                 Tubing disconnect  Assistance with pump alarms                                                      Dislodged catheter  Excessive leakage noted from pump                                         Inadequate pain control             Nerve Catheter Removal Instructions  When your device is empty:    Remove your catheter by pulling the dressing off slowly (like you would remove a regular bandage). The catheter should pull right out of the skin.  Check that the BLUE tip is intact.                                                                                     If the catheter is stuck, reposition your   extremity and pull slowly until removed.  *If catheter is HURTING and WON'T come out, stop and call [phone] for further assistance.    Remove medication bag from the black carrying case.  Cut the tubing on right and left side of pump, and discard the medication bag and tubing into garbage.  Place the pump and black carrying case into the plastic bag and then place this into the return box.  Seal box with blue stickers and return to [place] postal  service.    THIS IS PRE-PAID POSTAGE.             [facility] COLD THERAPY - PATIENT INSTRUCTION SHEET    Cold Compression Therapy for your comfort and rehabilitation  Your caregivers want you to be productive in your rehab and comfortable during your stay. In keeping with those goals, you will be receiving an [facility] Cold Therapy Wrap to help ease post-operative pain and swelling that might keep you from getting back on track! Your SMI Cold Therapy Wrap is effective and simple-to-use, and you will be encouraged to apply it throughout your hospital stay and at home through the duration of your recovery.    When you are ready to go home  Be sure to take your SMI Cold Therapy Wrap and both sets of Gel Bags with you for continued comfort and use throughout your rehabilitation. If you don't already have them, ask your nurse or aide to retrieve your SMI Gel Bags from the patient freezer.    Home use precautions  Always follow your medical professional's application instructions upon discharge. Your SMI Cold Therapy Wrap and Gel Bags are designed to last for months following your surgery. Never heat the Gel Bags unless specified by your healthcare provider. Supervision is advised when using this product on children or geriatric patients. To avoid danger of suffocation, please keep the outer plastic packaging away from children & pets.    Cold Therapy Instructions  Place Gel Bags in a freezer set ¾ of the way to max temperature for at least (4) hours. For best results, lay the Gel Bags flat and [id number] in the freezer. Once frozen, slide Gel Bags into the gel pouch and secure your wrap to the affected area with the straps.  Gel wraps that have been stored in a freezer for an extended period of time may require a (10) minute period of softening up in a room temperature environment before application.  The gel pouch acts as a protective barrier. NEVER place frozen bags directly onto skin, as this may cause frostbite injury.  The [facility]  Cold Therapy Wrap is designed to be able to be worm while ambulating. The compression straps can be secured well enough so that the Wrap won't fall off while moving.  Wrap Application Videos can be viewed at [email or web address].  An additional protective barrier such as clothing, a washcloth, hand-towel or pillowcase may be used during prolonged treatment applications.  The Gel-Pouch and Wrap are both Latex-Free and the Gel Bag ingredients are non toxic.    SMI Wrap care instructions  The [facility] Cold Therapy Wrap may be hand washed and hung to dry when needed.    [facility] re-order information  Additional [facility] body specific wraps and/or Gel Bags can be re-ordered from [email or web address] or call [email or web address] ([phone])

## 2022-09-14 NOTE — THERAPY EVALUATION
Patient Name: Sully Yost  : 1975    MRN: [id number]                              Today's Date: 2022       Admit Date: 2022    Visit Dx:     ICD-10-CM ICD-9-CM   1. Primary osteoarthritis of left knee  M17.12 715.16     Patient Active Problem List   Diagnosis   • Primary osteoarthritis of left knee     Past Medical History:   Diagnosis Date   • Anemia    • Anesthesia complication     per pt, she stopped breathing on table during cyst removal and required resucitation   • Anxiety    • Arthritis    • Asthma    • Depression    • GERD (gastroesophageal reflux disease)    • Heart murmur    • Migraine    • Sleep apnea     self-diagnosed, has not had study to confirm     Past Surgical History:   Procedure Laterality Date   • CYST REMOVAL     • VAGINAL DELIVERY      x5      General Information     Row Name 22 1301          Physical Therapy Time and Intention    Document Type evaluation  -LR     Mode of Treatment physical therapy;individual therapy  -LR     Row Name 22 1301          General Information    Patient Profile Reviewed yes  -LR     Prior Level of Function min assist:;all household mobility;community mobility;gait;transfer;bed mobility;ADL's  all mobility limited by pain  -LR     Existing Precautions/Restrictions fall;other (see comments)  L adductor canal nerve catheter  -LR     Barriers to Rehab previous functional deficit  -LR     Row Name 22 1301          Living Environment    People in Home child(martín), dependent;other (see comments)  will have no assist upon d/c home  -LR     Row Name 22 1301          Home Main Entrance    Number of Stairs, Main Entrance four  -LR     Stair Railings, Main Entrance railing on right side (ascending)  -LR     Row Name 22 1301          Stairs Within Home, Primary    Stairs, Within Home, Primary bedroom/bathroom on second floor but plans to stay on first floor and use BSC  -LR     Row Name 22 1301          Cognition     Orientation Status (Cognition) oriented x 4  -LR     Row Name 09/14/22 1301          Safety Issues, Functional Mobility    Safety Issues Affecting Function (Mobility) safety precautions follow-through/compliance;safety precaution awareness;positioning of assistive device  -LR     Impairments Affecting Function (Mobility) strength;pain;endurance/activity tolerance;range of motion (ROM)  -LR           User Key  (r) = Recorded By, (t) = Taken By, (c) = Cosigned By    Initials Name Provider Type    LR Christiana Chiang, PT Physical Therapist               Mobility     Row Name 09/14/22 1301          Bed Mobility    Bed Mobility supine-sit  -LR     Supine-Sit [place] (Bed Mobility) verbal cues;standby assist  -LR     Assistive Device (Bed Mobility) head of bed elevated;bed rails  -LR     Comment, (Bed Mobility) Verbal cues to move LEs towards EOB and to push up from bed to raise trunk into sitting and to scoot hips out to get feet on floor. Denied dizziness upon sitting up.  -LR     Row Name 09/14/22 1301          Transfers    Comment, (Transfers) Verbal cues to push up from bed to stand and to reach back for chair to lower into sitting. Patient did not reach back despite these cues. Verbal cues step L LE out before t/f for comfort. Assisted to and from bathroom. Cues to use grab bars for UE support. Cues to drive RW flush with sink to avoid twisting/pivoting for hand hygiene at sink.  -LR     Row Name 09/14/22 1301          Bed-Chair Transfer    Bed-Chair [place] (Transfers) not tested  -LR     Row Name 09/14/22 1301          Sit-Stand Transfer    Sit-Stand [place] (Transfers) verbal cues;contact guard;2 person assist  -LR     Assistive Device (Sit-Stand Transfers) walker, front-wheeled  -LR     Row Name 09/14/22 1301          Gait/Stairs (Locomotion)    [place] Level (Gait) verbal cues;contact guard;2 person assist  -LR     Assistive Device (Gait) walker, front-wheeled  -LR     Ambulated day  of surgery or within 4 hours of PACU discharge yes  -LR     Distance in Feet (Gait) 350  -LR     Deviations/Abnormal Patterns (Gait) bilateral deviations;chelsy decreased;gait speed decreased;stride length decreased;left sided deviations;antalgic  -LR     Bilateral Gait Deviations heel strike decreased  -LR     Left Sided Gait Deviations weight shift ability decreased  -LR     [place] Level (Stairs) not tested  -LR     Comment, (Gait/Stairs) Patient ambulated with step through gait pattern at slow pace. Verbal cues for correct sequencing of steps, increased L LE weight bearing/stance phase, decreased UE weight bearing, increased step length, and increased L knee flexion during swing phase. Improved with cues for correction and gait became smoother. Gait limited by fatigue.  -LR     Row Name 09/14/22 1301          Mobility    Extremity Weight-bearing Status left lower extremity  -LR     Left Lower Extremity (Weight-bearing Status) weight-bearing as tolerated (WBAT)  -LR           User Key  (r) = Recorded By, (t) = Taken By, (c) = Cosigned By    Initials Name Provider Type    LR Christiana Chiang, PT Physical Therapist               Obj/Interventions     Row Name 09/14/22 1301          Range of Motion Comprehensive    General Range of Motion lower extremity range of motion deficits identified  -LR     Row Name 09/14/22 1301          Strength Comprehensive (MMT)    General Manual Muscle Testing (MMT) Assessment lower extremity strength deficits identified  -LR     Row Name 09/14/22 1301          Motor Skills    Therapeutic Exercise ankle;knee;other (see comments)  cues for technique; independent with L SLR, able to actively DF bilaterally  -LR     Row Name 09/14/22 1301          Knee (Therapeutic Exercise)    Knee (Therapeutic Exercise) AROM (active range of motion);strengthening exercise;isometric exercises  -LR     Knee AROM (Therapeutic Exercise) left;heel slides;sitting;supine;3 repetitions  -LR     Knee  Isometrics (Therapeutic Exercise) left;quad sets;supine;10 repetitions  -LR     Knee Strengthening (Therapeutic Exercise) left;SLR (straight leg raise);SAQ (short arc quad);LAQ (long arc quad);sitting;supine;standing;3 repetitions  standing calf raises  -LR     Row Name 09/14/22 1301          Ankle (Therapeutic Exercise)    Ankle (Therapeutic Exercise) AROM (active range of motion)  -LR     Ankle AROM (Therapeutic Exercise) bilateral;plantarflexion;dorsiflexion;supine;10 repetitions  -LR     Row Name 09/14/22 1301          Balance    Balance Assessment sitting static balance;sitting dynamic balance;standing static balance;standing dynamic balance  -LR     Static Sitting Balance supervision  -LR     Dynamic Sitting Balance supervision  -LR     Position, Sitting Balance unsupported;sitting edge of bed  -LR     Static Standing Balance contact guard;2-person assist  -LR     Dynamic Standing Balance contact guard;2-person assist  -LR     Position/Device Used, Standing Balance supported;walker, rolling  -LR     Row Name 09/14/22 1301          Sensory Assessment (Somatosensory)    Sensory Assessment (Somatosensory) LE sensation intact;other (see comments)  denies numbness/tingling; reports light touch equal and intact upon assessment, able to actively DF bilaterally  -LR     Row Name 09/14/22 1301          General Lower Extremity Assessment (Range of Motion)    Lower Extremity: Range of Motion RLE ROM WFL;knee, left: LE ROM  -LR     Comment: Lower Extremity ROM L knee AROM 11-91 degrees; lacking 11 degrees extension AROM, demonstrates 91 degrees flexon AAROM in sitting  -LR     Row Name 09/14/22 1301          Lower Extremity (Manual Muscle Testing)    Lower Extremity: Manual Muscle Testing (MMT) right LE strength is WFL;left knee strength deficit  -LR     Comment, MMT: Lower Extremity L knee functionally 4-/5, independent with SLR, no knee buckling with weight bearing  -LR           User Key  (r) = Recorded By, (t) = Taken  By, (c) = Cosigned By    Initials Name Provider Type    LR Christiana Chiang, PT Physical Therapist               Goals/Plan     Row Name 09/14/22 1301          Bed Mobility Goal 1 (PT)    Activity/Assistive Device (Bed Mobility Goal 1, PT) sit to supine/supine to sit  -LR     [place] Level/Cues Needed (Bed Mobility Goal 1, PT) modified independence  -LR     Time Frame (Bed Mobility Goal 1, PT) long term goal (LTG);3 days  -LR     Progress/Outcomes (Bed Mobility Goal 1, PT) goal ongoing  -LR     Row Name 09/14/22 1301          Transfer Goal 1 (PT)    Activity/Assistive Device (Transfer Goal 1, PT) sit-to-stand/stand-to-sit;walker, rolling  -LR     [place] Level/Cues Needed (Transfer Goal 1, PT) modified independence  -LR     Time Frame (Transfer Goal 1, PT) long term goal (LTG)  -LR     Progress/Outcome (Transfer Goal 1, PT) goal ongoing  -     Row Name 09/14/22 1301          Gait Training Goal 1 (PT)    Activity/Assistive Device (Gait Training Goal 1, PT) gait (walking locomotion);walker, rolling  -LR     [place] Level (Gait Training Goal 1, PT) modified independence  -LR     Distance (Gait Training Goal 1, PT) 500 feet  -LR     Time Frame (Gait Training Goal 1, PT) long term goal (LTG);3 days  -LR     Progress/Outcome (Gait Training Goal 1, PT) goal ongoing  -     Row Name 09/14/22 1301          ROM Goal 1 (PT)    ROM Goal 1 (PT) 0-100 degrees L knee AROM  -LR     Time Frame (ROM Goal 1, PT) long-term goal (LTG);3 days  -LR     Progress/Outcome (ROM Goal 1, PT) goal ongoing  -LR     Row Name 09/14/22 1301          Stairs Goal 1 (PT)    Activity/Assistive Device (Stairs Goal 1, PT) ascending stairs;descending stairs;using handrail, right;step-to-step;cane, straight  -LR     [place] Level/Cues Needed (Stairs Goal 1, PT) contact guard required  -LR     Number of Stairs (Stairs Goal 1, PT) 4  -LR     Time Frame (Stairs Goal 1, PT) long term goal (LTG);3 days  -LR     Progress/Outcome  (Stairs Goal 1, PT) goal ongoing  -LR     Row Name 09/14/22 1301          Therapy Assessment/Plan (PT)    Planned Therapy Interventions (PT) balance training;bed mobility training;gait training;home exercise program;patient/family education;ROM (range of motion);stair training;strengthening;transfer training  -LR           User Key  (r) = Recorded By, (t) = Taken By, (c) = Cosigned By    Initials Name Provider Type    LR Christiana Chiang, PT Physical Therapist               Clinical Impression     Row Name 09/14/22 1301          Pain    Pretreatment Pain Rating 1/10  -LR     Posttreatment Pain Rating 2/10  -LR     Pain Location - Side/Orientation Left  -LR     Pain Location anterior  -LR     Pain Location - knee  -LR     Pain Intervention(s) Ambulation/increased activity;Repositioned;Cold applied  -LR     Row Name 09/14/22 1301          Plan of Care Review    Plan of Care Reviewed With patient  -LR     Progress improving  -LR     Outcome Evaluation Patient ambulated 350 feet with RW, CGAx2, step through gait pattern, limited by fatigue. L knee AROM progressing well, 11-91 degrees. Recommend d/c home with family and HHPT. Encouraged frequent ambulation with nursing. Will continue to progress as able.  -LR     Row Name 09/14/22 1301          Therapy Assessment/Plan (PT)    Patient/Family Therapy Goals Statement (PT) go home  -LR     Rehab Potential (PT) good, to achieve stated therapy goals  -LR     Criteria for Skilled Interventions Met (PT) yes;meets criteria;skilled treatment is necessary  -LR     Therapy Frequency (PT) 2 times/day  -LR     Row Name 09/14/22 1301          Positioning and Restraints    Pre-Treatment Position in bed  -LR     Post Treatment Position chair  -LR     In Chair notified nsg;reclined;sitting;call light within reach;encouraged to call for assist;exit alarm on;with family/caregiver;legs elevated;compression device  -LR           User Key  (r) = Recorded By, (t) = Taken By, (c) =  Cosigned By    Initials Name Provider Type    LR Christiana Chiang, PT Physical Therapist               Outcome Measures     Row Name 09/14/22 1301 09/14/22 1255       How much help from another person do you currently need...    Turning from your back to your side while in flat bed without using bedrails? 4  -LR 4  -MK    Moving from lying on back to sitting on the side of a flat bed without bedrails? 3  -LR 4  -MK    Moving to and from a bed to a chair (including a wheelchair)? 3  -LR 3  -MK    Standing up from a chair using your arms (e.g., wheelchair, bedside chair)? 3  -LR 3  -MK    Climbing 3-5 steps with a railing? 3  -LR 3  -MK    To walk in hospital room? 3  -LR 3  -MK    AM-PAC 6 Clicks Score (PT) 19  -LR 20  -MK    Highest level of mobility 6 --> Walked 10 steps or more  -LR 6 --> Walked 10 steps or more  -MK    Row Name 09/14/22 1301          PADD    Diagnosis 1  -LR     Gender 1  -LR     Age Group 2  -LR     Gait Distance 1  -LR     Assist Level 1  -LR     Home Support 3  -LR     PADD Score 9  -LR     Patient Preference home with home health  -LR     Prediction by PADD Score directly home (with home health or out-patient rehab)  -LR     Row Name 09/14/22 1301          Functional Assessment    Outcome Measure Options AM-PAC 6 Clicks Basic Mobility (PT);PADD  -LR           User Key  (r) = Recorded By, (t) = Taken By, (c) = Cosigned By    Initials Name Provider Type    Christiana Evangelista, PT Physical Therapist    Melisa Suarez, RN Registered Nurse                             Physical Therapy Education                 Title: PT OT SLP Therapies (Done)     Topic: Physical Therapy (Done)     Point: Mobility training (Done)     Learning Progress Summary           Patient Acceptance, E,D,H, VU,NR by LR at 9/14/2022 1301    Comment: Issued/reviewed written/illustrated HEP. Educated on precautions, weight bearing status, correct supine to sit t/f technique, correct sit<->stand t/f technique,  correct gait mechanics, safety with mobility, and progression of POC.   Family Acceptance, E,D,H, VU,NR by LR at 9/14/2022 1301    Comment: Issued/reviewed written/illustrated HEP. Educated on precautions, weight bearing status, correct supine to sit t/f technique, correct sit<->stand t/f technique, correct gait mechanics, safety with mobility, and progression of POC.                   Point: Home exercise program (Done)     Learning Progress Summary           Patient Acceptance, E,D,H, VU,NR by LR at 9/14/2022 1301    Comment: Issued/reviewed written/illustrated HEP. Educated on precautions, weight bearing status, correct supine to sit t/f technique, correct sit<->stand t/f technique, correct gait mechanics, safety with mobility, and progression of POC.   Family Acceptance, E,D,H, VU,NR by LR at 9/14/2022 1301    Comment: Issued/reviewed written/illustrated HEP. Educated on precautions, weight bearing status, correct supine to sit t/f technique, correct sit<->stand t/f technique, correct gait mechanics, safety with mobility, and progression of POC.                   Point: Body mechanics (Done)     Learning Progress Summary           Patient Acceptance, E,D,H, VU,NR by LR at 9/14/2022 1301    Comment: Issued/reviewed written/illustrated HEP. Educated on precautions, weight bearing status, correct supine to sit t/f technique, correct sit<->stand t/f technique, correct gait mechanics, safety with mobility, and progression of POC.   Family Acceptance, E,D,H, VU,NR by LR at 9/14/2022 1301    Comment: Issued/reviewed written/illustrated HEP. Educated on precautions, weight bearing status, correct supine to sit t/f technique, correct sit<->stand t/f technique, correct gait mechanics, safety with mobility, and progression of POC.                   Point: Precautions (Done)     Learning Progress Summary           Patient Acceptance, E,D,H, VU,NR by LR at 9/14/2022 1301    Comment: Issued/reviewed written/illustrated HEP.  Educated on precautions, weight bearing status, correct supine to sit t/f technique, correct sit<->stand t/f technique, correct gait mechanics, safety with mobility, and progression of POC.   Family Acceptance, E,D,H, VU,NR by LR at 9/14/2022 1301    Comment: Issued/reviewed written/illustrated HEP. Educated on precautions, weight bearing status, correct supine to sit t/f technique, correct sit<->stand t/f technique, correct gait mechanics, safety with mobility, and progression of POC.                               User Key     Initials Effective Dates Name Provider Type Discipline    LR 06/16/21 -  Christiana Chiang, PT Physical Therapist PT              PT Recommendation and Plan  Planned Therapy Interventions (PT): balance training, bed mobility training, gait training, home exercise program, patient/family education, ROM (range of motion), stair training, strengthening, transfer training  Plan of Care Reviewed With: patient  Progress: improving  Outcome Evaluation: Patient ambulated 350 feet with RW, CGAx2, step through gait pattern, limited by fatigue. L knee AROM progressing well, 11-91 degrees. Recommend d/c home with family and HHPT. Encouraged frequent ambulation with nursing. Will continue to progress as able.     Time Calculation:    PT Charges     Row Name 09/14/22 1301             Time Calculation    Start Time 1301  -LR      PT Received On 09/14/22  -LR      PT Goal Re-Cert Due Date 09/24/22  -LR              Timed Charges    33800 - PT Therapeutic Exercise Minutes 8  -LR      56449 - Gait Training Minutes  15  -LR              Untimed Charges    PT Eval/Re-eval Minutes 25  -LR              Total Minutes    Timed Charges Total Minutes 23  -LR      Untimed Charges Total Minutes 25  -LR       Total Minutes 48  -LR            User Key  (r) = Recorded By, (t) = Taken By, (c) = Cosigned By    Initials Name Provider Type    LR Christiana Chiang, PT Physical Therapist              Therapy Charges  for Today     Code Description Service Date Service Provider Modifiers Qty    18257346073  PT THER PROC EA 15 MIN 9/14/2022 Christiana Chiang, PT GP 1    04354326301 HC GAIT TRAINING EA 15 MIN 9/14/2022 Christiana Chiang, PT GP 1    [id number]  PT THER SUPP EA 15 MIN 9/14/2022 Christiana Chiang, PT GP 3    13340752299 HC PT EVAL LOW COMPLEXITY 2 9/14/2022 Christiana Chiang, PT GP 1          PT G-Codes  Outcome Measure Options: AM-PAC 6 Clicks Basic Mobility (PT), PADD  AM-PAC 6 Clicks Score (PT): 19    Christiana Chiang, PT  9/14/2022

## 2022-09-14 NOTE — PLAN OF CARE
Goal Outcome Evaluation:  Plan of Care Reviewed With: patient        Progress: improving  Outcome Evaluation: A&Ox4, VSS, RA. Dressing CDI. Infublock in place. Denies numbness/tingling. Ambulated w/ PT. Up w/ 1 assist. Voiding spontaneously.

## 2022-09-14 NOTE — H&P
"Patient Name: Sully Yost  MRN: [id number]  : 1975  DOS: 2022    Attending: Cayetano King MD    Primary Care Provider: Provider, No Known      Chief complaint: Left knee pain     Subjective   Patient is a pleasant 47 y.o. female presented for scheduled surgery by Dr. King.  She underwent left total knee arthroplasty under spinal anesthesia.  She tolerated surgery well and was admitted for further medical management.  Her knee has been painful for several years.  Her knee has been getting more unstable recently.  She denies use of assistive device for ambulation or recent falls.    When seen postop she is doing well.  Her pain is well controlled.  She denies nausea, shortness of breath or chest pain.  No history of DVT or PE.    Allergies:  Allergies   Allergen Reactions   • Penicillins Other (See Comments)     \"MY WHOLE FAMILY IS ALLERGIC AND I DONT WANT TO TAKE A CHANCE\"        Meds:  Medications Prior to Admission   Medication Sig Dispense Refill Last Dose   • Chlorhexidine Gluconate 4 % solution Shower daily with solution as directed for 5 days prior to surgery. 237 mL 0 2022 at Unknown time   • mupirocin (BACTROBAN) 2 % ointment Apply a pea-sized amount into the nostril(s) as directed by provider 2 (Two) Times a Day for 5 days prior to surgery 22 g 0 2022 at Unknown time   • albuterol sulfate  (90 Base) MCG/ACT inhaler Inhale 2 puffs Every 6 (Six) Hours As Needed.   2022         History:   Past Medical History:   Diagnosis Date   • Anemia    • Anesthesia complication     per pt, she stopped breathing on table during cyst removal and required resucitation   • Anxiety    • Arthritis    • Asthma    • Depression    • GERD (gastroesophageal reflux disease)    • Heart murmur    • Migraine    • Sleep apnea     self-diagnosed, has not had study to confirm     Past Surgical History:   Procedure Laterality Date   • CYST REMOVAL     • VAGINAL DELIVERY      x5 "     Family History   Problem Relation Age of Onset   • Cancer Mother    • Diabetes Mother    • No Known Problems Father      Social History     Tobacco Use   • Smoking status: Former Smoker     Packs/day: 0.50     Types: Cigarettes     Quit date: 2022     Years since quittin.2   • Smokeless tobacco: Never Used   Vaping Use   • Vaping Use: Never used   Substance Use Topics   • Alcohol use: Yes     Comment: OCCASIONALLY   • Drug use: No   She lives with her 2 daughters.  She had 6 children, 1 .  She works in childcare.    Review of Systems  All systems were reviewed and negative except for:  Gastrointestinal: positive for  constipation    Vital Signs  /69 (BP Location: Right arm, Patient Position: Lying)   Pulse 69   Temp 97.5 °F (36.4 °C)   Resp 16   SpO2 94%     Physical Exam:    General Appearance:    Alert, cooperative, in no acute distress   Head:    Normocephalic, without obvious abnormality, atraumatic   Eyes:            Lids and lashes normal, conjunctivae and sclerae normal, no   icterus, no pallor, corneas clear,    Ears:    Ears appear intact with no abnormalities noted   Throat:   No oral lesions, no thrush, oral mucosa moist   Neck:   No adenopathy, supple, trachea midline, no thyromegaly    Lungs:     Clear to auscultation,respirations regular, even and unlabored    Heart:    Regular rhythm and normal rate, normal S1 and S2, no murmur, no gallop   Abdomen:     Normal bowel sounds, no masses, no organomegaly, soft non-tender, non-distended, no guarding, no rebound  tenderness   Genitalia:    Deferred   Extremities:  Left knee Ace wrap CDI.  Nerve block present.   Pulses:   Pulses palpable and equal bilaterally   Skin:   No bleeding, bruising or rash   Neurologic:   Cranial nerves 2 - 12 grossly intact. Flexion and dorsiflexion intact bilateral feet.        I reviewed the patient's new clinical results.         Results from last 7 days   Lab Units 22  1218   POTASSIUM  mmol/L 4.0     Lab Results   Component Value Date    HGBA1C 5.60 09/01/2022      Latest Reference Range & Units 09/01/22 11:47   Glucose 65 - 99 mg/dL 115 (H)   Sodium 136 - 145 mmol/L 140   Potassium 3.5 - 5.2 mmol/L 3.8   CO2 22.0 - 29.0 mmol/L 27.0   Chloride 98 - 107 mmol/L 104   Anion Gap 5.0 - 15.0 mmol/L 9.0   Creatinine 0.57 - 1.00 mg/dL 0.58   BUN 6 - 20 mg/dL 9   BUN/Creatinine Ratio 7.0 - 25.0  15.5   Calcium 8.6 - 10.5 mg/dL 9.7   eGFR >60.0 mL/min/1.73 111.1   Hemoglobin A1C 4.80 - 5.60 % 5.60   Protime 11.4 - 14.4 Seconds 13.6   INR 0.84 - 1.13  1.05   PTT 22.0 - 39.0 seconds 32.9   WBC 3.40 - 10.80 10*3/mm3 10.96 (H)   RBC 3.77 - 5.28 10*6/mm3 4.72   Hemoglobin 12.0 - 15.9 g/dL 11.7 (L)   Hematocrit 34.0 - 46.6 % 37.0   RDW 12.3 - 15.4 % 15.8 (H)   MCV 79.0 - 97.0 fL 78.4 (L)   MCH 26.6 - 33.0 pg 24.8 (L)   MCHC 31.5 - 35.7 g/dL 31.6   MPV 6.0 - 12.0 fL 9.7   Platelets 140 - 450 10*3/mm3 324   (H): Data is abnormally high  (L): Data is abnormally low    Assessment and Plan:     Status post total left knee replacement    Primary osteoarthritis of left knee    Asthma      Plan  1. PT/OT- WBAT LLE  2. Pain control-prns   3. IS-encourage  4. DVT proph- Mechs/ASA  5. Bowel regimen  6. Resume home medications as appropriate  7. Monitor post-op labs  8. DC planning for home    Asthma  -Albuterol as needed  -Encourage frequent incentive spirometer and pulmonary toilet      Narcisa Patterson, TALISHA  09/14/22  14:38 EDT

## 2022-09-14 NOTE — PLAN OF CARE
Goal Outcome Evaluation:  Plan of Care Reviewed With: patient        Progress: improving  Outcome Evaluation: Patient ambulated 350 feet with RW, CGAx2, step through gait pattern, limited by fatigue. L knee AROM progressing well, 11-91 degrees. Recommend d/c home with family and HHPT. Encouraged frequent ambulation with nursing. Will continue to progress as able.Will need RW, BSC, and SPC for use at home.

## 2022-09-14 NOTE — DISCHARGE PLACEMENT REQUEST
"Meghan Yost (47 y.o. Female)             Date of Birth   1975    Social Security Number       Address   [address] [place] [address]    Home Phone   [phone]    MRN   [id number]       [facility]    Marital Status                               Admission Date   22    Admission Type   Elective    Admitting Provider   Cayetano King MD    Attending Provider   Cayetano King MD    Department, Room/Bed   [address], [address]/1       Discharge Date       Discharge Disposition       Discharge Destination                               Attending Provider: Cayetano King MD    Allergies: Penicillins    Isolation: None   Infection: COVID Screen (preop/placement) (22)   Code Status: CPR   Advance Care Planning Activity    Ht: 163.8 cm (64.5\")   Wt: 100 kg (220 lb 5.6 oz)    Admission Cmt: None   Principal Problem: Primary osteoarthritis of left knee [M17.12]                 Active Insurance as of 2022     Primary Coverage     Payor Plan Insurance Group Employer/Plan Group    [employer] [place] [employer] [place]      Payor Plan Address Payor Plan Phone Number Payor Plan Fax Number Effective Dates    [address] [address] [phone]  2017 - None Entered    [facility] [id number]       Subscriber Name Subscriber Birth Date Member ID       MEGHAN YOST 1975 [id number]                 Emergency Contacts      (Rel.) Home Phone Work Phone Mobile Phone    Ha Yost (Friend) [phone] -- --    Clotilde Mills (Other) -- -- [phone]          [place] [address]  Phone:  [phone]  Fax:  [phone] Date: Sep 14, 2022      Ambulatory Referral to Physical Therapy Evaluate and treat     Patient:  Meghan Ysot MRN:  [id number]   [address] [place] [address] :  1975  SSN:    Phone: [phone] Sex:  F "      INSURANCE PAYOR PLAN GROUP # SUBSCRIBER ID   Primary:    [facility] [id number]   [id number]      Referring Provider Information:  LOGAN BRANDT Phone: [phone] Fax: [phone]       Referral Information:   # Visits:  1 Referral Type: Physical Therapy [AE1]   Urgency:  Routine Referral Reason: Specialty Services Required   Start Date: Sep 14, 2022 End Date:  To be determined by Insurer   Diagnosis: Primary osteoarthritis of left knee (M17.12 [ICD-10-CM] 715.16 [ICD-9-CM])      Refer to Dept:   Refer to Provider:   Refer to Provider Phone:   Refer to Facility:       Specialty needed: Evaluate and treat  Follow-up needed: Yes     This document serves as a request of services and does not constitute Insurance authorization or approval of services.  To determine eligibility, please contact the members Insurance carrier to verify and review coverage.     If you have medical questions regarding this request for services. Please contact [address] at [phone] during normal business hours.        Authorizing Provider:Logan Brandt MD  Authorizing Provider's NPI: [id number]  Order Entered By: Aparna Ward RN 9/14/2022  1:12 PM     Electronically signed by: Logna Brandt MD 9/14/2022  1:12 PM         Insurance Information                [facility]/[employer] MEDICA[employer] Phone: [phone]    Subscriber: Yost Sully Trinidad Subscriber#: [id number]    Group#: -- Precert#: --             History & Physical      Gabrielle Jean Baptiste PA at 09/14/22 0652     Attestation signed by Logan Brandt MD at 09/14/22 0706    I have reviewed this documentation and agree. Proceed with left total knee arthroplasty.             Summary:Left Knee Pain                 [facility] PREOPERATIVE HISTORY AND PHYSICAL       Chief complaint:  Left Knee pain    Subjective:    Patient is a 47 y.o.female presents with history of left knee pain for over three years.  She has  "clinical and radiographic evidence of end-stage right hip joint degeneration. Conservative measures have been tried for 3 months or longer, but have failed to adequately treat or improve the patient's symptoms. Pain is restricting the patient's daily activities as well as quality of life.  See office note dated 06/30/2022.  She is here today for consented and scheduled left total knee arthroplasty.         Review of Systems:  General ROS: negative for fever, chills, weakness, dizziness, headache, fatigue, weight changes  Cardiovascular ROS: no chest pain or dyspnea on exertion  Respiratory ROS: no cough, shortness of breath, or wheezing  GI ROS: no abdominal pain/discomfort, nausea, vomiting or diarrhea   ROS: no dysuria, hematuria or complaints  Skin ROS: no itching, rash or open wounds.        Allergies:   Allergies   Allergen Reactions   • Penicillins Other (See Comments)     \"MY WHOLE FAMILY IS ALLERGIC AND I DONT WANT TO TAKE A CHANCE\"    Latex: no known allergy  Contrast Dye:  no known allergy      Home Meds    Medications Prior to Admission   Medication Sig Dispense Refill Last Dose   • Chlorhexidine Gluconate 4 % solution Shower daily with solution as directed for 5 days prior to surgery. 237 mL 0 9/14/2022 at Unknown time   • mupirocin (BACTROBAN) 2 % ointment Apply a pea-sized amount into the nostril(s) as directed by provider 2 (Two) Times a Day for 5 days prior to surgery 22 g 0 9/13/2022 at Unknown time   • albuterol sulfate  (90 Base) MCG/ACT inhaler Inhale 2 puffs Every 6 (Six) Hours As Needed.   9/12/2022     PMH:   Past Medical History:   Diagnosis Date   • Anemia    • Anesthesia complication     per pt, she stopped breathing on table during cyst removal and required resucitation   • Anxiety    • Arthritis    • Asthma    • Depression    • GERD (gastroesophageal reflux disease)    • Heart murmur    • Migraine    • Sleep apnea     self-diagnosed, has not had study to confirm     PSH:    Past " Surgical History:   Procedure Laterality Date   • CYST REMOVAL     • VAGINAL DELIVERY      x5     Immunization History: pneumonia= no      Influenza= no      Tetanus= unknown       Social History     Tobacco Use   • Smoking status: Former Smoker     Packs/day: 0.50     Types: Cigarettes     Quit date: 2022     Years since quittin.2   • Smokeless tobacco: Never Used   Substance Use Topics   • Alcohol use: Yes     Comment: OCCASIONALLY         Physical Exam:/79 (BP Location: Right arm, Patient Position: Lying)   Pulse 74   Temp 97.8 °F (36.6 °C) (Temporal)   Resp 18   SpO2 97%       General Appearance:    Alert, cooperative, no distress, appears stated age   Head:    Normocephalic, without obvious abnormality, atraumatic   Lungs:     Clear to auscultation bilaterally, respirations unlabored    Heart: Regular rate and rhythm, S1 and S2 normal, no murmur, rub    or gallop    Abdomen:    Soft without tenderness  +bowel sounds   Breast Exam:    deferred   Genitalia:    deferred   Extremities:   Extremities normal, atraumatic, no cyanosis or edema   Skin:   Skin color, texture, turgor normal, no rashes or lesions   Neurologic:   Grossly intact     Results Review:   LABS:  Lab Results   Component Value Date    WBC 10.96 (H) 2022    HGB 11.7 (L) 2022    HCT 37.0 2022    MCV 78.4 (L) 2022     2022    NEUTROABS 7.17 (H) 2022    GLUCOSE 115 (H) 2022    BUN 9 2022    CREATININE 0.58 2022     2022    K 3.8 2022     2022    CO2 27.0 2022    CALCIUM 9.7 2022       RADIOLOGY:  Imaging Results (Last 72 Hours)     ** No results found for the last 72 hours. **          Cancer Staging (if applicable):  Cancer Patient: __ yes __no __unknown; If yes, clinical stage T:__ N:__M:__, stage group    Impression:  Primary Osteoarthritis of Left Knee    Plan:  LEFT TOTAL KNEE ARTHROPLASTY        CORINNE Briseno 2022  06:53 EDT          Electronically signed by Cayetano King MD at 09/14/22 0706

## 2022-09-14 NOTE — CASE MANAGEMENT/SOCIAL WORK
Continued Stay Note  [facility]     Patient Name: Sully Yost  MRN: [id number]  Today's Date: 9/14/2022    Admit Date: 9/14/2022     Discharge Plan     Row Name 09/14/22 1342       Plan    Plan home with outpt PT    Patient/Family in Agreement with Plan yes    Plan Comments Pt lives in [facility] and reports she is independent with ADLs. She denies current use of any DME. Pt is followed by her PCP and has drug coverage. Her plan for discharge is to return home wtih outpt PT. A rolling walker will be brought to her room via [employer]. Referral has been sent to [facility] and PT, [phone]. Her first visit is scheduled for 9/16 at 0800    Final Discharge Disposition Code 01 - home or self-care               Discharge Codes    No documentation.                     Aparna Ward RN

## 2022-09-14 NOTE — OP NOTE
OPERATIVE REPORT     DATE OF PROCEDURE: 9/14/2022    SURGEON: Cayetano King M.D.     ASSISTANT(S): Circulator: Jillian Campbell RN  Scrub Person: Nayana Call  Vendor Representative: David Coleman; Kashif García  Nursing Assistant: Velvet Vasques PCT; Laurita Lester PCT  Assistant: Mindi Jordan PA-C  Assistant: Mindi Jordan PA-C    Note-PA was utilized during the case to facilitate positioning the patient, exposure, retraction, placement of final components and definitive closure.    PREOPERATIVE DIAGNOSIS: Advanced degenerative joint disease of the left knee secondary to osteoarthritis    POSTOPERATIVE DIAGNOSIS: same     PROCEDURE: Left total Knee Arthroplasty     SURGICAL DETAILS:     APPROACH: Medial parapatellar     ANESTHESIA: Spinal plus local periarticular block    PREOPERATIVE ANTIBIOTICS: Ancef 2 g IV    TRANEXAMIC ACID: IV    TOURNIQUET TIME: 57 min @300 mmHg     ESTIMATED BLOOD LOSS: 25 cc     SPECIMENS: None    IMPLANTS:   /Brand: [place] triathlon  Tibial component size: 4 pressfit tritanium baseplate   Femoral component size: 4 pressfit cruciate retaining   Tibial polyethylene insert: 11 mm cruciate stabilizing   Patellar component: 32 mm asymmetric tritanium  Cement: None    DRAINS: None    LOCAL INJECTION: 1 cc Toradol 30mg/ml, 4 cc duramorph 2mg/ml, 20 cc 0.5% ropivicaine, 20 cc 0.5% lidocaine with 1:200,000 epinephrine, 15 cc preservative free normal saline     MODIFIER(S): None    COMPLICATIONS: None apparent    INDICATIONS FOR PROCEDURE: The patient has a long history of progressive knee pain, arthritis, and degeneration resulting in deformity in the left knee from predominantly medial wear and bone loss. Non-operative treatment and conservative therapeutic measures have been attempted, but have not improved or controlled the symptoms and pain that occurs during normal daily activities. Knee motion has also become limited and is restricting the  patient. Total knee arthroplasty was recommended. The risks, benefits, alternatives, and potential complications of the arthroplasty surgery were discussed with the patient in detail to include but not be limited to infection, bleeding, anesthesia risks, damage to neurovascular structures, osteolysis, aseptic loosening, instability, anterior knee pain, continued pain, iatrogenic fracture, dislocation, need for future surgery including the potential for amputation, blood clots, myocardial infarction, stroke, and death. Specific details of the surgical procedure, hospitalization, recovery, rehabilitation, and long-term precautions were also presented. Pre-operative teaching was provided. Implant/prosthesis selection was outlined, and the many options available were explained; the final choice will be made at the time of the procedure to match the anatomy and condition of the bone, ligaments, tendons, and muscles. The patient completed preoperative medical optimization and risk assessment, joint arthroplasty education, and MRSA decolonization using a universal decolonization protocol. Perioperative blood management and the potential for blood transfusion were discussed with risks and options clearly outlined.     INTRAOPERATIVE FINDINGS: Advanced tricompartmental osteoarthritis with genu varum alignment    PROCEDURE: The patient was identified in the preoperative holding area. The operative site was confirmed and marked. A sequential compression device was placed on the nonoperative leg. The risks, benefits, and alternatives to surgery were again confirmed with the patient and the patient wished to proceed. The patient was brought to the operating room and placed on the operating room table in the supine position. All bony prominences were padded. A huddle was performed with the patient and all vital surgical team members to confirm the correct operative site, procedure, anesthesia type, and operative plan with the  patient. After anesthesia was performed, a tourniquet was applied to the upper thigh of the operative leg. A full knee exam was performed once anesthesia was in full effect. Intravenous antibiotic prophylaxis was given and confirmed with the anesthesia team.     The operative leg was prepped and draped in the usual sterile fashion. A surgical time out was performed immediately preceding the incision with all personnel in the operating room to confirm patient identity, the correct operative site and extremity, correct radiographic studies, availability of appropriate surgical equipment and agreement on the planned procedure. The operative knee was elevated and exsanguinated using an esmarch and the tourniquet was inflated. The knee was exposed using a limited anterior-midline skin incision. Dissection was carried down through skin and subcutaneous tissue to the extensor mechanism with a scalpel. A medial parapatellar arthrotomy was made to enter the knee space sharply. A large amount of normal appearing joint fluid was encountered and suctioned. The synovium was thickened, hypertrophic, and inflamed. A partial synovectomy was performed for exposure, and the medial and lateral gutters were cleared of scar and synovial reflections. The superficial medial collateral ligament was carefully elevated off osteophytes which were then removed with a rongeur and osteotome. Degenerative meniscal remnants were excised medially and laterally. The patellar synovial reflections were released and the patella exposed to reveal complete wear through the articular surface. The trochlea demonstrated similar severe wear. The patella was then subluxed laterally. The knee was then flexed up to 90 degrees.     Assessment of the knee joint revealed severe end-stage articular damage with no remaining medial weight bearing cartilage. The medial compartment was severely eburnated with bone loss on the medial tibia and medial femoral condyle,  resulting in the varus deformity. Osteophytes were removed from the notch. The ACL was resected. Osteophytes were then further debrided from the femur and the tibia.     Attention was then turned to the femur. The medullary cavity of the femur was entered anterior-medial to the intra-condylar notch above the PCL with a 5/6th inch step drill. The femoral canal was over-reamed, irrigated, and suctioned to prevent fat embolization. An intramedullary alignment system was then placed, fixed to the femur with pins, and the distal femoral osteotomy was made in 5 degrees of valgus with an 8 mm resection. Attention was then turned to the tibia. Retractors were placed medially and laterally to protect the collateral ligaments and a Tone retractor was placed around the PCL in the intra-condylar notch. The tibia was then subluxed anteriorly for wide exposure. An extramedullary alignment system was then placed and the proximal tibial osteotomy was made measuring 1-2 mm off the most affected side and 9-10 mm off the unaffected side with approximately 3 degrees posterior slope. The guide was also confirmed to be perpendicular to the tibial axis prior to the osteotomy. A sizing guide was then used to select the tibial component size. The knee was then brought to extension and the appropriate sized spacer block was placed. This brought the knee to full extension with excellent medial and lateral balance.     The flexion gap was then inspected and measured both visually and with a tensioner device to assess the medial and lateral flexion balance. A femur posterior reference guide was placed in 6 degrees of external rotation in accordance with the soft tissue balance. This resulted in symmetric flexion and extension gaps and was verified against the epicondylar axis and Llano's line. The femur was sized using a posterior referencing guide and, using the previously determined degrees of rotation, drill holes were made for the  cutting block. The 4 in 1 cutting block was impacted into place and secured. Cuts were completed using a saw with the collaterals protected by Z-retractors. Care was taken to preserve the PCL. A lamina  was placed with the knee in 90 degrees of flexion and a large curved osteotome, rongeur, and curettes were utilized to clear posterior osteophytes, loose bodies and meniscal remnants.     A femoral trial implant was placed; excellent fit was confirmed. The medial-lateral and anterior-posterior dimensions were checked; anatomic fit and coverage were achieved.The proximal tibia baseplate trial was placed with its mid-point at the junction of medial one-third and lateral two-thirds of the tibial tubercle and pinned to this fixed position. A trial reduction was then performed. Trial reduction demonstrated the knee achieved full extension with excellent stability and range of motion, and no tendency toward instability with varus-valgus stress at full extension, mid-flexion, or 90 degrees of flexion. The PCL was also found to be appropriately tensioned with normal posterior tibial excursion.     Next, attention was turned to the patella. It was measured and the posterior 9-10 mm was resected leaving a healthy remnant with greater than 11mm thickness. The patella was sized with the asymmetric guide, and drill holes were made. A trial button was placed and tracking of the patella and the entire knee trial was tested. The patella tracking was excellent throughout range of motion with no instability. Punches and drills were then placed through the trials to accommodate the final implants. All trials were removed.     The wound was copiously lavaged with a pulse irrigation/suction system. The posterior recess of the knee and areas of known bleeding were treated with the electrocautery to reduce post-operative bleeding. A pain cocktail was injected into the jean-articular tissues. The cut bone surfaces were then  irrigated again, suctioned, and dried. The final implants were impacted into place, tibia followed by tibial polyethylene followed by femur followed by patella. The tourniquet was released and no excessive bleeding was encountered. Synovial bleeding was further treated with the electrocautery until adequate hemostasis was obtained.     The wound was again irrigated with dilute betadine solution followed by saline. The extensor mechanism and capsule was then anatomically closed with interrupted #1 Vicryl suture and a running #2 Stratafix stitch. Knee stability and range of motion with the capsule closed was excellent, and range of motion was 0 to 135 degrees without excessive stress on the repair. Instrument and sponge count was completed and confirmed correct. Deep and superficial subcutaneous tissue was closed with interrupted 2-0 Vicryl suture. A running 3-0 Monocryl subcuticular stitch was used to re-approximate the skin edges followed by skin glue adhesive to seal the wound. A silver impregnated dressing was then placed, followed by a sequential compression device to the operative limb. The patient was sufficiently recovered from anesthesia, transferred to a hospital bed and taken to the PACU in stable condition.     One gram (1000 mg) of intravenous tranexamic acid was administered prior to incision. A second one gram (1000 mg) intravenous dose was given prior to wound closure.    No apparent complications occurred during the procedure. Instrument, sponge and needle counts were correct x 2.     The patient underwent risk stratification preoperatively and aspirin was chosen for DVT prophylaxis. Delay in starting chemical prophylaxis for 23 hours from surgical incision was over concerns for hematoma formation and wound related issues.     POST OPERATIVE PLAN:   Weight bearing as tolerated with knee range of motion as tolerated   Pain control with PO/IV meds   Adductor canal catheter placement by Anesthesia Pain  Management Team in PACU.   23 hours perioperative antibiotic prophylaxis   PT/OT for mobilization and medical equipment needs   Keep silver dressing in place for 7 days post op. Change dressing only if saturated.   SCDs to bilateral lower extremities   Social work for discharge planning needs   Follow up in 3 weeks for post operative wound check with XR AP and lateral of operative knee.

## 2022-09-14 NOTE — ANESTHESIA POSTPROCEDURE EVALUATION
Patient: Sully Yost    Procedure Summary     Date: 09/14/22 Room / Location:  [facility] 09 /  [facility]    Anesthesia Start: 0719 Anesthesia Stop: 0937    Procedure: LEFT TOTAL KNEE ARTHROPLASTY (Left Knee) Diagnosis:       Primary osteoarthritis of left knee      (Primary osteoarthritis of left knee [M17.12])    Surgeons: Cayetano King MD Provider: Primitivo Mendoza MD    Anesthesia Type: spinal ASA Status: 2          Anesthesia Type: spinal    Vitals  Vitals Value Taken Time   BP 98/57 09/14/22 0935   Temp 97 °F (36.1 °C) 09/14/22 0932   Pulse 79 09/14/22 0936   Resp 14 09/14/22 0932   SpO2 100 % 09/14/22 0936   Vitals shown include unvalidated device data.        Post Anesthesia Care and Evaluation    Patient location during evaluation: PACU  Patient participation: complete - patient participated  Level of consciousness: awake and alert  Pain management: adequate    Airway patency: patent  Anesthetic complications: No anesthetic complications  PONV Status: none  Cardiovascular status: hemodynamically stable and acceptable  Respiratory status: nonlabored ventilation, acceptable and nasal cannula  Hydration status: acceptable

## 2022-09-14 NOTE — ANESTHESIA PROCEDURE NOTES
Adductor catheter      Patient reassessed immediately prior to procedure    Patient location during procedure: post-op  Reason for block: at surgeon's request and post-op pain management  Performed by  CRNA/CAA: Joshua Lane CRNA  Assisted by: Paulina Manuel RNSRNA: Christiana Davis SRNA  Preanesthetic Checklist  Completed: patient identified, IV checked, site marked, risks and benefits discussed, surgical consent, monitors and equipment checked, pre-op evaluation and timeout performed  Prep:  Pt Position: supine  Sterile barriers:cap, gloves, mask and sterile barriers  Prep: ChloraPrep  Patient monitoring: blood pressure monitoring, continuous pulse oximetry and EKG  Procedure    Sedation: no  Performed under: spinal  Guidance:ultrasound guided  Images:still images obtained, printed/placed on chart    Laterality:left  Block Type:adductor canal block  Injection Technique:catheter  Needle Type:Tuohy and echogenic  Needle Gauge:18 G  Resistance on Injection: none  Catheter Size:20 G (20g)  Cath Depth at skin: 11 cm    Medications Used: bupivacaine PF (MARCAINE) 0.25 % injection, 15 mL  Med administered at 9/14/2022 9:38 AM      Medications  Preservative Free Saline:5ml    Post Assessment  Injection Assessment: negative aspiration for heme, incremental injection and no paresthesia on injection  Patient Tolerance:comfortable throughout block  Complications:no  Additional Notes  Procedure:             The pt was placed in the Supine position.  The Insertion site was  prepped and Draped in sterile fashion.  The pt was anesthetized with  IV Sedation( see meds).  Skin and cutaneous tissue was infiltrated and anesthetized with 1% Lidocaine 3 mls via a 25g needle.  A BBraun 4 inch 18g echogenic needle was then  inserted approximately midline, mid-thigh and advanced In-plane with Ultrasound guidance.  Normal Saline PSF was utilized for hydrodissection of tissue.  The Vastus medialis and Sartorius muscle where visualized and  the needle tip was placed in the adductor canal,  lateral to the femoral artery.  LA injection spread was visualized, injection was incremental 1-5ml, injection pressure was normal or little, no intraneural injection, no vascular injection.  LA dose was injected thru the needle(see dose above).  A BBraun 20g wire stylet catheter was placed via the needle with ultrasound visualization and confirmation with NS fluid bolus. The catheter insertion site was sealed with exofin tissue adhesive. The labeled catheter was then coiled and secured to skin with benzoin,  steristrips and CHG transparent dressing.  Appropriate labels were applied.  Thank you.

## 2022-09-14 NOTE — H&P
"  [facility] PREOPERATIVE HISTORY AND PHYSICAL       Chief complaint:  Left Knee pain    Subjective:    Patient is a 47 y.o.female presents with history of left knee pain for over three years.  She has clinical and radiographic evidence of end-stage right hip joint degeneration. Conservative measures have been tried for 3 months or longer, but have failed to adequately treat or improve the patient's symptoms. Pain is restricting the patient's daily activities as well as quality of life.  See office note dated 06/30/2022.  She is here today for consented and scheduled left total knee arthroplasty.         Review of Systems:  General ROS: negative for fever, chills, weakness, dizziness, headache, fatigue, weight changes  Cardiovascular ROS: no chest pain or dyspnea on exertion  Respiratory ROS: no cough, shortness of breath, or wheezing  GI ROS: no abdominal pain/discomfort, nausea, vomiting or diarrhea   ROS: no dysuria, hematuria or complaints  Skin ROS: no itching, rash or open wounds.        Allergies:   Allergies   Allergen Reactions   • Penicillins Other (See Comments)     \"MY WHOLE FAMILY IS ALLERGIC AND I DONT WANT TO TAKE A CHANCE\"    Latex: no known allergy  Contrast Dye:  no known allergy      Home Meds    Medications Prior to Admission   Medication Sig Dispense Refill Last Dose   • Chlorhexidine Gluconate 4 % solution Shower daily with solution as directed for 5 days prior to surgery. 237 mL 0 9/14/2022 at Unknown time   • mupirocin (BACTROBAN) 2 % ointment Apply a pea-sized amount into the nostril(s) as directed by provider 2 (Two) Times a Day for 5 days prior to surgery 22 g 0 9/13/2022 at Unknown time   • albuterol sulfate  (90 Base) MCG/ACT inhaler Inhale 2 puffs Every 6 (Six) Hours As Needed.   9/12/2022     PMH:   Past Medical History:   Diagnosis Date   • Anemia    • Anesthesia complication     per pt, she stopped breathing on table during cyst removal and required " resucitation   • Anxiety    • Arthritis    • Asthma    • Depression    • GERD (gastroesophageal reflux disease)    • Heart murmur    • Migraine    • Sleep apnea     self-diagnosed, has not had study to confirm     PSH:    Past Surgical History:   Procedure Laterality Date   • CYST REMOVAL  2004   • VAGINAL DELIVERY      x5     Immunization History: pneumonia= no      Influenza= no      Tetanus= unknown       Social History     Tobacco Use   • Smoking status: Former Smoker     Packs/day: 0.50     Types: Cigarettes     Quit date: 2022     Years since quittin.2   • Smokeless tobacco: Never Used   Substance Use Topics   • Alcohol use: Yes     Comment: OCCASIONALLY         Physical Exam:/79 (BP Location: Right arm, Patient Position: Lying)   Pulse 74   Temp 97.8 °F (36.6 °C) (Temporal)   Resp 18   SpO2 97%       General Appearance:    Alert, cooperative, no distress, appears stated age   Head:    Normocephalic, without obvious abnormality, atraumatic   Lungs:     Clear to auscultation bilaterally, respirations unlabored    Heart: Regular rate and rhythm, S1 and S2 normal, no murmur, rub    or gallop    Abdomen:    Soft without tenderness  +bowel sounds   Breast Exam:    deferred   Genitalia:    deferred   Extremities:   Extremities normal, atraumatic, no cyanosis or edema   Skin:   Skin color, texture, turgor normal, no rashes or lesions   Neurologic:   Grossly intact     Results Review:   LABS:  Lab Results   Component Value Date    WBC 10.96 (H) 2022    HGB 11.7 (L) 2022    HCT 37.0 2022    MCV 78.4 (L) 2022     2022    NEUTROABS 7.17 (H) 2022    GLUCOSE 115 (H) 2022    BUN 9 2022    CREATININE 0.58 2022     2022    K 3.8 2022     2022    CO2 27.0 2022    CALCIUM 9.7 2022       RADIOLOGY:  Imaging Results (Last 72 Hours)     ** No results found for the last 72 hours. **          Cancer Staging (if  applicable):  Cancer Patient: __ yes __no __unknown; If yes, clinical stage T:__ N:__M:__, stage group    Impression:  Primary Osteoarthritis of Left Knee    Plan:  LEFT TOTAL KNEE ARTHROPLASTY        CORINNE Briseno 9/14/2022 06:53 EDT

## 2022-09-15 ENCOUNTER — HOME HEALTH ADMISSION (OUTPATIENT)
Dept: HOME HEALTH SERVICES | Facility: HOME HEALTHCARE | Age: 47
End: 2022-09-15

## 2022-09-15 VITALS
HEART RATE: 91 BPM | OXYGEN SATURATION: 99 % | DIASTOLIC BLOOD PRESSURE: 67 MMHG | SYSTOLIC BLOOD PRESSURE: 107 MMHG | RESPIRATION RATE: 18 BRPM | TEMPERATURE: 98.5 F

## 2022-09-15 PROBLEM — G89.18 ACUTE POSTOPERATIVE PAIN: Status: ACTIVE | Noted: 2022-09-15

## 2022-09-15 PROBLEM — D62 ACUTE BLOOD LOSS ANEMIA: Status: ACTIVE | Noted: 2022-09-15

## 2022-09-15 LAB
ANION GAP SERPL CALCULATED.3IONS-SCNC: 10 MMOL/L (ref 5–15)
BUN SERPL-MCNC: 7 MG/DL (ref 6–20)
BUN/CREAT SERPL: 13.5 (ref 7–25)
CALCIUM SPEC-SCNC: 8.3 MG/DL (ref 8.6–10.5)
CHLORIDE SERPL-SCNC: 106 MMOL/L (ref 98–107)
CO2 SERPL-SCNC: 25 MMOL/L (ref 22–29)
CREAT SERPL-MCNC: 0.52 MG/DL (ref 0.57–1)
EGFRCR SERPLBLD CKD-EPI 2021: 114.1 ML/MIN/1.73
GLUCOSE SERPL-MCNC: 105 MG/DL (ref 65–99)
HCT VFR BLD AUTO: 28.5 % (ref 34–46.6)
HGB BLD-MCNC: 8.7 G/DL (ref 12–15.9)
POTASSIUM SERPL-SCNC: 3.6 MMOL/L (ref 3.5–5.2)
SODIUM SERPL-SCNC: 141 MMOL/L (ref 136–145)

## 2022-09-15 PROCEDURE — 97110 THERAPEUTIC EXERCISES: CPT

## 2022-09-15 PROCEDURE — 97165 OT EVAL LOW COMPLEX 30 MIN: CPT

## 2022-09-15 PROCEDURE — 97535 SELF CARE MNGMENT TRAINING: CPT

## 2022-09-15 PROCEDURE — 25010000002 CEFAZOLIN IN DEXTROSE 2-4 GM/100ML-% SOLUTION: Performed by: ORTHOPAEDIC SURGERY

## 2022-09-15 PROCEDURE — 85014 HEMATOCRIT: CPT | Performed by: ORTHOPAEDIC SURGERY

## 2022-09-15 PROCEDURE — 99024 POSTOP FOLLOW-UP VISIT: CPT | Performed by: ORTHOPAEDIC SURGERY

## 2022-09-15 PROCEDURE — G0378 HOSPITAL OBSERVATION PER HR: HCPCS

## 2022-09-15 PROCEDURE — 85018 HEMOGLOBIN: CPT | Performed by: ORTHOPAEDIC SURGERY

## 2022-09-15 PROCEDURE — 80048 BASIC METABOLIC PNL TOTAL CA: CPT | Performed by: ORTHOPAEDIC SURGERY

## 2022-09-15 PROCEDURE — 97116 GAIT TRAINING THERAPY: CPT

## 2022-09-15 RX ORDER — ROPIVACAINE HYDROCHLORIDE 2 MG/ML
1 INJECTION, SOLUTION EPIDURAL; INFILTRATION; PERINEURAL CONTINUOUS
Start: 2022-09-15 | End: 2022-12-02

## 2022-09-15 RX ORDER — ACETAMINOPHEN 500 MG
1000 TABLET ORAL EVERY 8 HOURS
Qty: 42 TABLET | Refills: 0
Start: 2022-09-15 | End: 2022-09-22

## 2022-09-15 RX ORDER — ASPIRIN 81 MG/1
81 TABLET ORAL 2 TIMES DAILY
Qty: 60 TABLET | Refills: 0 | Status: SHIPPED | OUTPATIENT
Start: 2022-09-16

## 2022-09-15 RX ORDER — OXYCODONE HYDROCHLORIDE 5 MG/1
5 TABLET ORAL EVERY 4 HOURS PRN
Qty: 40 TABLET | Refills: 0 | Status: SHIPPED | OUTPATIENT
Start: 2022-09-15 | End: 2022-10-04

## 2022-09-15 RX ORDER — MELOXICAM 15 MG/1
15 TABLET ORAL DAILY
Qty: 15 TABLET | Refills: 0 | Status: SHIPPED | OUTPATIENT
Start: 2022-09-15 | End: 2022-09-30

## 2022-09-15 RX ORDER — POLYETHYLENE GLYCOL 3350 17 G/17G
17 POWDER, FOR SOLUTION ORAL DAILY
Qty: 238 G | Refills: 0 | Status: SHIPPED | OUTPATIENT
Start: 2022-09-15 | End: 2022-09-30

## 2022-09-15 RX ADMIN — ASPIRIN 81 MG: 81 TABLET, COATED ORAL at 07:56

## 2022-09-15 RX ADMIN — OXYCODONE 5 MG: 5 TABLET ORAL at 10:04

## 2022-09-15 RX ADMIN — CEFAZOLIN SODIUM 2 G: 2 INJECTION, SOLUTION INTRAVENOUS at 00:09

## 2022-09-15 RX ADMIN — ACETAMINOPHEN 1000 MG: 500 TABLET, FILM COATED ORAL at 05:45

## 2022-09-15 RX ADMIN — MELOXICAM 15 MG: 15 TABLET ORAL at 07:56

## 2022-09-15 NOTE — PLAN OF CARE
Goal Outcome Evaluation:  Plan of Care Reviewed With: patient        Progress: improving  Outcome Evaluation: Pt demo'd improved tolerance to activity today. Amb 350' with FWW and CGA with no LOB or c/o lightheadedness/dizziness. Required VC for heel-toe gait pattern. Reviewed HEP and protocol with pt, who demo'd good teach back of exercises. Will attempt stairs this afternoon as pt has 4 GET. PT rec home with assist and HHPT at this time.

## 2022-09-15 NOTE — THERAPY EVALUATION
Patient Name: Sully Yost  : 1975    MRN: [id number]                              Today's Date: 9/15/2022       Admit Date: 2022    Visit Dx:     ICD-10-CM ICD-9-CM   1. Status post total left knee replacement  Z96.652 V43.65   2. Primary osteoarthritis of left knee  M17.12 715.16     Patient Active Problem List   Diagnosis   • Primary osteoarthritis of left knee   • Status post total left knee replacement   • Asthma     Past Medical History:   Diagnosis Date   • Anemia    • Anesthesia complication     per pt, she stopped breathing on table during cyst removal and required resucitation   • Anxiety    • Arthritis    • Asthma    • Depression    • GERD (gastroesophageal reflux disease)    • Heart murmur    • Migraine    • Sleep apnea     self-diagnosed, has not had study to confirm     Past Surgical History:   Procedure Laterality Date   • CYST REMOVAL     • TOTAL KNEE ARTHROPLASTY Left 2022    Procedure: TOTAL KNEE ARTHROPLASTY LEFT;  Surgeon: Cayetano King MD;  [facility];  Service: Orthopedics;  Laterality: Left;   • VAGINAL DELIVERY      x5      General Information     Row Name 09/15/22 1309          OT Time and Intention    Document Type evaluation  -HK     Mode of Treatment occupational therapy  -     Row Name 09/15/22 1300          General Information    Patient Profile Reviewed yes  -HK     Prior Level of Function min assist:;all household mobility;community mobility;gait;transfer;ADL's;bed mobility  -HK     Existing Precautions/Restrictions fall;other (see comments)  L adductor canal nerve catheter  -HK     Barriers to Rehab previous functional deficit  -HK     Row Name 09/15/22 1302          Living Environment    People in Home child(martín), dependent;child(martín), adult  per pt her adult son is staying with her. Has 2 daughters in the home as well.  -HK     Row Name 09/15/22 1307          Home Main Entrance    Number of Stairs, Main Entrance four  -HK     Stair  Railings, Main Entrance railings not in good condition, need repair for safe use  -     Row Name 09/15/22 1309          Stairs Within Home, Primary    Stairs, Within Home, Primary Pt reports entrance rails are not in good condition  -     Number of Stairs, Within Home, Primary other (see comments)  13  -     Stair Railings, Within Home, Primary other (see comments)  rails change sides half way up the stairs  -     Row Name 09/15/22 1309          Cognition    Orientation Status (Cognition) oriented x 4  -     Row Name 09/15/22 1309          Safety Issues, Functional Mobility    Safety Issues Affecting Function (Mobility) safety precaution awareness;safety precautions follow-through/compliance  -     Impairments Affecting Function (Mobility) strength;pain;endurance/activity tolerance;range of motion (ROM)  -           User Key  (r) = Recorded By, (t) = Taken By, (c) = Cosigned By    Initials Name Provider Type     Gloria Colbert, OT Occupational Therapist                 Mobility/ADL's     Row Name 09/15/22 1317          Bed Mobility    Comment, (Bed Mobility) Received and left [facility]. OT issued leg  and educated pt on use for bed mobility, car transfers, and tub transfers if using tub transfer bench.  -     Row Name 09/15/22 1317          Transfers    Transfers sit-stand transfer;toilet transfer  -     Comment, (Transfers) Verbal cues for safe hand placement and sequencing. Cues to slide L LE out prior to transfers. Issued home safety handout and handout on tub bench. Pt educated on need and where to purchase. Pt reports a family member has once she can borrow.  -     Sit-Stand [place] (Transfers) contact guard;verbal cues;1 person assist  -     [place] Level (Toilet Transfer) contact guard;verbal cues  -     Assistive Device (Toilet Transfer) walker, front-wheeled  -     Row Name 09/15/22 1317          Sit-Stand Transfer    Assistive Device (Sit-Stand Transfers) walker,  front-wheeled  -[facility] Name 09/15/22 1317          Toilet Transfer    Type (Toilet Transfer) sit-stand;stand-sit  -HK     Row Name 09/15/22 1317          Functional Mobility    Functional Mobility- Ind. Level supervision required  -HK     Functional Mobility- Device walker, front-wheeled  -HK     Functional Mobility-Distance (Feet) 25  -HK     Functional Mobility- Safety Issues weight-shifting ability decreased;step length decreased  -HK     Row Name 09/15/22 1317          Activities of Daily Living    BADL Assessment/Intervention lower body dressing;bathing;toileting;grooming  -HK     Row Name 09/15/22 1317          Mobility    Extremity Weight-bearing Status left lower extremity  -HK     Left Lower Extremity (Weight-bearing Status) weight-bearing as tolerated (WBAT)  -HK     Row Name 09/15/22 1317          Lower Body Dressing Assessment/Training    [place] Level (Lower Body Dressing) doff;don;socks;minimum assist (75% patient effort);verbal cues  -HK     Position (Lower Body Dressing) unsupported sitting  -HK     Comment, (Lower Body Dressing) OT issued reacher, sock aid, shoe horn and educated pt on use for completion of all LBD. Pt dof/don socks with Carolee and use of reacher and sock aid. Pt declined to get dressed this session.  -HK     Row Name 09/15/22 1317          Bathing Assessment/Intervention    Comment, (Bathing) OT issued LH sponge and educated pt on us for completion of all LBB.  -HK     Row Name 09/15/22 1317          Toileting Assessment/Training    [place] Level (Toileting) adjust/manage clothing;perform perineal hygiene;independent  -HK     Position (Toileting) unsupported sitting;supported standing  -HK     Row Name 09/15/22 1317          Grooming Assessment/Training    [place] Level (Grooming) wash face, hands;supervision  -HK     Position (Grooming) supported standing  -HK           User Key  (r) = Recorded By, (t) = Taken By, (c) = Cosigned By    Initials Name Provider Type     HK Gloria Colbert OT Occupational Therapist               Obj/Interventions     Row Name 09/15/22 1325          Sensory Assessment (Somatosensory)    Sensory Assessment (Somatosensory) sensation intact  -     Row Name 09/15/22 1325          Vision Assessment/Intervention    Visual Impairment/Limitations WFL  -HK     [facility] Name 09/15/22 1325          Range of Motion Comprehensive    General Range of Motion no range of motion deficits identified  -HK     Comment, General Range of Motion BUE AROM WFL for eval  -HK     [facility] Name 09/15/22 1325          Strength Comprehensive (MMT)    General Manual Muscle Testing (MMT) Assessment no strength deficits identified  -HK     Comment, General Manual Muscle Testing (MMT) Assessment BUE WFL for eval  -HK     [facility] Name 09/15/22 1325          Balance    Balance Assessment sitting static balance;sitting dynamic balance;standing static balance;standing dynamic balance  -HK     Static Sitting Balance supervision  -     Dynamic Sitting Balance supervision  -HK     Position, Sitting Balance unsupported;sitting in chair  -HK     Static Standing Balance contact guard  -HK     Dynamic Standing Balance contact guard  -HK     Position/Device Used, Standing Balance supported;walker, rolling  -HK     Balance Interventions sitting;standing;occupation based/functional task  -HK           User Key  (r) = Recorded By, (t) = Taken By, (c) = Cosigned By    Initials Name Provider Type    HK Gloria Colbert, OT Occupational Therapist               Goals/Plan     [facility] Name 09/15/22 1330          Bed Mobility Goal 1 (OT)    Activity/Assistive Device (Bed Mobility Goal 1, OT) sit to supine/supine to sit;scooting  -HK     [place] Level/Cues Needed (Bed Mobility Goal 1, OT) supervision required  -HK     Time Frame (Bed Mobility Goal 1, OT) by discharge;long term goal (LTG)  -HK     Progress/Outcomes (Bed Mobility Goal 1, OT) goal ongoing  -[facility] Name 09/15/22 1330          Transfer Goal 1 (OT)     Activity/Assistive Device (Transfer Goal 1, OT) sit-to-stand/stand-to-sit;toilet  -HK     [place] Level/Cues Needed (Transfer Goal 1, OT) modified independence  -HK     Time Frame (Transfer Goal 1, OT) by discharge;long term goal (LTG)  -HK     Progress/Outcome (Transfer Goal 1, OT) goal ongoing  -     Row Name 09/15/22 1330          Dressing Goal 1 (OT)    Activity/Device (Dressing Goal 1, OT) lower body dressing  -HK     [place]/Cues Needed (Dressing Goal 1, OT) modified independence  -HK     Time Frame (Dressing Goal 1, OT) by discharge;long term goal (LTG)  -HK     Progress/Outcome (Dressing Goal 1, OT) goal ongoing  -     Row Name 09/15/22 1330          Toileting Goal 1 (OT)    Activity/Device (Toileting Goal 1, OT) adjust/manage clothing;perform perineal hygiene  -HK     [place] Level/Cues Needed (Toileting Goal 1, OT) independent  -HK     Time Frame (Toileting Goal 1, OT) by discharge;long term goal (LTG)  -HK     Progress/Outcome (Toileting Goal 1, OT) goal ongoing  -     Row Name 09/15/22 7149          Therapy Assessment/Plan (OT)    Planned Therapy Interventions (OT) adaptive equipment training;BADL retraining;functional balance retraining;occupation/activity based interventions;transfer/mobility retraining;ROM/therapeutic exercise  -HK           User Key  (r) = Recorded By, (t) = Taken By, (c) = Cosigned By    Initials Name Provider Type    HK Gloria Colbert, OT Occupational Therapist               Clinical Impression     Row Name 09/15/22 9496          Pain Assessment    Pretreatment Pain Rating 7/10  -HK     Posttreatment Pain Rating 7/10  -HK     Pain Location - Side/Orientation Left  -HK     Pain Location generalized  -HK     Pain Location - knee  -HK     Pain Intervention(s) Ambulation/increased activity;Repositioned  -HK     Row Name 09/15/22 1898          Plan of Care Review    Plan of Care Reviewed With patient  -HK     Progress improving  -HK     Outcome Evaluation OT briana  complete. OT issued reacher, sock aid, shoe horn, and LH sponge and educated pt on use for completion of all ADLS. Pt dof/don socks with Carolee and use of AE. Pt completed all toileting with no assist from OT. Pt completes sit to stand with CGA and ambulates with supervision and RW. Issued hand out on tub transfer bench. Will need BSC prior to returning home as pts bathroom is on second floor. Recommend d/c home with assist and HH OT.  -     Row Name 09/15/22 1327          Therapy Assessment/Plan (OT)    Rehab Potential (OT) good, to achieve stated therapy goals  -     Criteria for Skilled Therapeutic Interventions Met (OT) yes;skilled treatment is necessary  -     Therapy Frequency (OT) daily  -     Row Name 09/15/22 1327          Therapy Plan Review/Discharge Plan (OT)    Anticipated Discharge Disposition (OT) home with assist;home with home health  -     Row Name 09/15/22 1327          Vital Signs    Pre Systolic BP Rehab --  RN cleared for tx; VSS  -HK     O2 Delivery Pre Treatment room air  -HK     O2 Delivery Intra Treatment room air  -HK     O2 Delivery Post Treatment room air  -HK     Pre Patient Position Sitting  -HK     Intra Patient Position Standing  -HK     Post Patient Position Sitting  -     Row Name 09/15/22 1327          Positioning and Restraints    Pre-Treatment Position sitting in chair/recliner  -HK     Post Treatment Position chair  -HK     In Chair notified nsg;reclined;call light within reach;encouraged to call for assist  no alarm on OT entry  -HK           User Key  (r) = Recorded By, (t) = Taken By, (c) = Cosigned By    Initials Name Provider Type     Gloria Colbert, OT Occupational Therapist               Outcome Measures     Row Name 09/15/22 9421          How much help from another is currently needed...    Putting on and taking off regular lower body clothing? 3  -HK     Bathing (including washing, rinsing, and drying) 3  -HK     Toileting (which includes using toilet bed  pan or urinal) 4  -HK     Putting on and taking off regular upper body clothing 4  -HK     Taking care of personal grooming (such as brushing teeth) 3  -HK     Eating meals 3  -HK     AM-PAC 6 Clicks Score (OT) 20  -HK     Row Name 09/15/22 1011          How much help from another person do you currently need...    Turning from your back to your side while in flat bed without using bedrails? 4  -ES     Moving from lying on back to sitting on the side of a flat bed without bedrails? 3  -ES     Moving to and from a bed to a chair (including a wheelchair)? 3  -ES     Standing up from a chair using your arms (e.g., wheelchair, bedside chair)? 3  -ES     Climbing 3-5 steps with a railing? 3  -ES     To walk in hospital room? 3  -ES     AM-PAC 6 Clicks Score (PT) 19  -ES     Highest level of mobility 6 --> Walked 10 steps or more  -ES     Row Name 09/15/22 1331 09/15/22 1011       Functional Assessment    Outcome Measure Options AM-PAC 6 Clicks Daily Activity (OT)  -HK AM-PAC 6 Clicks Basic Mobility (PT)  -ES          User Key  (r) = Recorded By, (t) = Taken By, (c) = Cosigned By    Initials Name Provider Type     Gloria Colbert, OT Occupational Therapist    ES Amanda De Jesus, PT Physical Therapist                Occupational Therapy Education                 Title: PT OT SLP Therapies (In Progress)     Topic: Occupational Therapy (In Progress)     Point: ADL training (Done)     Description:   Instruct learner(s) on proper safety adaptation and remediation techniques during self care or transfers.   Instruct in proper use of assistive devices.              Learning Progress Summary           Patient Acceptance, E,TB,D,H, VU,DU by  at 9/15/2022 1331                   Point: Home exercise program (Not Started)     Description:   Instruct learner(s) on appropriate technique for monitoring, assisting and/or progressing therapeutic exercises/activities.              Learner Progress:  Not documented in this visit.           Point: Precautions (Done)     Description:   Instruct learner(s) on prescribed precautions during self-care and functional transfers.              Learning Progress Summary           Patient Acceptance, E,TB,D,H, VU,DU by  at 9/15/2022 1331                   Point: Body mechanics (Done)     Description:   Instruct learner(s) on proper positioning and spine alignment during self-care, functional mobility activities and/or exercises.              Learning Progress Summary           Patient Acceptance, E,TB,D,H, VU,DU by  at 9/15/2022 1331                               User Key     Initials Effective Dates Name Provider Type Discipline     06/16/21 -  Gloria Colbert, OT Occupational Therapist OT              OT Recommendation and Plan  Planned Therapy Interventions (OT): adaptive equipment training, BADL retraining, functional balance retraining, occupation/activity based interventions, transfer/mobility retraining, ROM/therapeutic exercise  Therapy Frequency (OT): daily  Plan of Care Review  Plan of Care Reviewed With: patient  Progress: improving  Outcome Evaluation: OT eval complete. OT issued reacher, sock aid, shoe horn, and LH sponge and educated pt on use for completion of all ADLS. Pt dof/don socks with Carolee and use of AE. Pt completed all toileting with no assist from OT. Pt completes sit to stand with CGA and ambulates with supervision and RW. Issued hand out on tub transfer bench. Will need BSC prior to returning home as pts bathroom is on second floor. Recommend d/c home with assist and HH OT.     Time Calculation:    Time Calculation- OT     Row Name 09/15/22 1332 09/15/22 1012          Time Calculation- OT    OT Start Time 1120  -HK --            Timed Charges    69419 - Gait Training Minutes  -- 13  -ES     38590 - OT Self Care/Mgmt Minutes 12 -HK --            Untimed Charges    OT Eval/Re-eval Minutes 40  -HK --            Total Minutes    Timed Charges Total Minutes 12 -HK 13  -ES      Untimed Charges Total Minutes 40  -HK --      Total Minutes 52  -HK 13  -ES           User Key  (r) = Recorded By, (t) = Taken By, (c) = Cosigned By    Initials Name Provider Type    HK Gloria Colbert, OT Occupational Therapist    ES Amanda De Jesus, JC Physical Therapist              Therapy Charges for Today     Code Description Service Date Service Provider Modifiers Qty    96050851254 HC OT SELF CARE/MGMT/TRAIN EA 15 MIN 9/15/2022 Gloria Colbert, OT GO 1    [id number]  OT EVAL LOW COMPLEXITY 3 9/15/2022 Gloria Colbert, OT GO 1               Gloria Colbert OT  9/15/2022

## 2022-09-15 NOTE — DISCHARGE SUMMARY
Patient Name: Sully Yost  MRN: [id number]  : 1975  DOS: 9/15/2022    Attending: No att. providers found    Primary Care Provider: Provider, No Known    Date of Admission:.2022  5:06 AM    Date of Discharge:  9/15/2022    Discharge Diagnosis:   Status post total left knee replacement    Primary osteoarthritis of left knee    Asthma    Acute postoperative pain    Acute blood loss anemia, asymptomatic      Hospital Course    At admit:    Patient is a pleasant 47 y.o. female presented for scheduled surgery by Dr. King.  She underwent left total knee arthroplasty under spinal anesthesia.  She tolerated surgery well and was admitted for further medical management.  Her knee has been painful for several years.  Her knee has been getting more unstable recently.  She denies use of assistive device for ambulation or recent falls.     When seen postop she is doing well.  Her pain is well controlled.  She denies nausea, shortness of breath or chest pain.  No history of DVT or PE.     After admit:    Patient was provided pain medications as needed for pain control, along with adductor canal nerve block infusion of Ropivacaine.    Adjustments were made to pain medications to optimize postop pain management.   Risks and benefits of opiate medications discussed with patient.  Joseph report in chart was reviewed prior to discharge.    She was seen by PT and OT and has progressed well over her stay.    She used an IS for atelectasis prophylaxis and aspirin along with mechanicals for DVT prophylaxis.  Home medications were resumed as appropriate, and labs were monitored and remained fairly stable.     With the progress she has made, she is ready for DC home today.      She will have an adductor canal nerve block (instructed on it during this admit).    Discussed with patient regarding plan and she shows understanding and agreement.    Patient will have HHPT following discharge.      Procedures  Performed    DATE OF PROCEDURE: 2022     SURGEON: Cayetano King M.D.      PREOPERATIVE DIAGNOSIS: Advanced degenerative joint disease of the left knee secondary to osteoarthritis     POSTOPERATIVE DIAGNOSIS: same      PROCEDURE: Left total Knee Arthroplasty     Pertinent Test Results:    I reviewed the patient's new clinical results.   Results from last 7 days   Lab Units 09/15/22  0723   HEMOGLOBIN g/dL 8.7*   HEMATOCRIT % 28.5*      Latest Reference Range & Units 22 11:47   Hemoglobin 12.0 - 15.9 g/dL 11.7 (L)   Hematocrit 34.0 - 46.6 % 37.0   (L): Data is abnormally low  Results from last 7 days   Lab Units 09/15/22  0723 22  1218   SODIUM mmol/L 141  --    POTASSIUM mmol/L 3.6 4.0   CHLORIDE mmol/L 106  --    CO2 mmol/L 25.0  --    BUN mg/dL 7  --    CREATININE mg/dL 0.52*  --    CALCIUM mg/dL 8.3*  --    GLUCOSE mg/dL 105*  --      I reviewed the patient's new imaging including images and reports.      Physical therapy: Pt ambulated 200' with CGA and FWW. Demo'd improved heel-toe gait pattern. Pt navigated up/down 5 steps with VC for proper sequencing without LOB or c/o increased pain. Pt lives alone with 3 dependent children that are unable to drive pt to appointments. Due to pt's impaired strength, ROM, activity tolerance, and inability to drive at this time, PT rec home with assist and HHPT to continued skilled care needed to return to [facility].      Discharge Assessment:    Vital Signs  Visit Vitals  /67 (BP Location: Left arm, Patient Position: Lying)   Pulse 91   Temp 98.5 °F (36.9 °C) (Oral)   Resp 18   SpO2 99%     Temp (24hrs), Av.2 °F (36.8 °C), Min:97.7 °F (36.5 °C), Max:98.5 °F (36.9 °C)      General Appearance:    Alert, cooperative, in no acute distress   Lungs:     Clear to auscultation,respirations regular, even and unlabored    Heart:    Regular rhythm and normal rate, normal S1 and S2   Abdomen:     Normal bowel sounds, no masses, no organomegaly, soft non-tender,  non-distended, no guarding, no rebound tenderness   Extremities:   CDI dressing surgical knee, left. ACB cath present, arrow pump. Moves all extremities well, no edema, no cyanosis, no redness   Pulses:   Pulses palpable and equal bilaterally   Skin:   No bleeding, bruising or rash   Neurologic:   Cranial nerves 2 - 12 grossly intact, sensation intact, Flexion and dorsiflexion intact bilateral feet.       Discharge Disposition: Home    Discharge Medications:     Discharge Medications      New Medications      Instructions Start Date   acetaminophen 500 MG tablet  Commonly known as: TYLENOL   1,000 mg, Oral, Every 8 Hours, Take every 8 hours  as needed after 1 week      Adult Aspirin Regimen 81 MG EC tablet  Generic drug: aspirin   81 mg, Oral, 2 Times Daily   Start Date: September 16, 2022     meloxicam 15 MG tablet  Commonly known as: MOBIC   15 mg, Oral, Daily      oxyCODONE 5 MG immediate release tablet  Commonly known as: Roxicodone   5 mg, Oral, Every 4 Hours PRN      polyethylene glycol 17 GM/SCOOP powder  Commonly known as: MIRALAX   Mix 1 capful (17 g) in water or non-carbonated beverage and drink by mouth Daily for 15 days.      ropivacaine 0.2 % infusion (INFUSYSTEM)  Commonly known as: NAROPIN   1 mL/hr (2 mg/hr), Peripheral Nerve, Continuous         Continue These Medications      Instructions Start Date   albuterol sulfate  (90 Base) MCG/ACT inhaler  Commonly known as: PROVENTIL HFA;VENTOLIN HFA;PROAIR HFA   2 puffs, Inhalation, Every 6 Hours PRN             Discharge Diet: Regular diet      Activity at Discharge: WBAT LLE      Follow-up Appointments:  Dr. King per his orders      Scribed for Dr. Umana by TALISHA Calderon. 9/15/2022  15:17 EDT    TALISHA Calderon  09/15/22  15:17 EDT     Milli BOLANOS MD, personally performed the services described in this documentation as scribed by TALISHA Calderon ,and it is both accurate and complete.

## 2022-09-15 NOTE — PLAN OF CARE
Goal Outcome Evaluation:  Plan of Care Reviewed With: patient        Progress: improving  Outcome Evaluation: OT eval complete. OT issued reacher, sock aid, shoe horn, and LH sponge and educated pt on use for completion of all ADLS. Pt dof/don socks with Carolee and use of AE. Pt completed all toileting with no assist from OT. Pt completes sit to stand with CGA and ambulates with supervision and RW. Issued hand out on tub transfer bench. Will need BSC prior to returning home as pts bathroom is on second floor. Recommend d/c home with assist and HH OT.

## 2022-09-15 NOTE — THERAPY TREATMENT NOTE
Patient Name: Sully Yost  : 1975    MRN: [id number]                              Today's Date: 9/15/2022       Admit Date: 2022    Visit Dx:     ICD-10-CM ICD-9-CM   1. Status post total left knee replacement  Z96.652 V43.65   2. Primary osteoarthritis of left knee  M17.12 715.16     Patient Active Problem List   Diagnosis   • Primary osteoarthritis of left knee   • Status post total left knee replacement   • Asthma     Past Medical History:   Diagnosis Date   • Anemia    • Anesthesia complication     per pt, she stopped breathing on table during cyst removal and required resucitation   • Anxiety    • Arthritis    • Asthma    • Depression    • GERD (gastroesophageal reflux disease)    • Heart murmur    • Migraine    • Sleep apnea     self-diagnosed, has not had study to confirm     Past Surgical History:   Procedure Laterality Date   • CYST REMOVAL     • TOTAL KNEE ARTHROPLASTY Left 2022    Procedure: TOTAL KNEE ARTHROPLASTY LEFT;  Surgeon: Cayetano King MD;  Location: [facility];  Service: Orthopedics;  Laterality: Left;   • VAGINAL DELIVERY      x5      General Information     Row Name 09/15/22 1342 09/15/22 1003       Physical Therapy Time and Intention    Document Type therapy note (daily note)  -ES therapy note (daily note)  -ES    Mode of Treatment physical therapy  -ES physical therapy  -ES    Row Name 09/15/22 1342 09/15/22 1003       General Information    Patient Profile Reviewed yes  -ES yes  -ES    Existing Precautions/Restrictions fall;other (see comments)  L adductor canal nerve catheter  -ES fall;other (see comments)  L adductor canal nerve catheter  -ES    Barriers to Rehab previous functional deficit  -ES previous functional deficit  -ES    Row Name 09/15/22 1342 09/15/22 1003       Cognition    Orientation Status (Cognition) oriented x 4  -ES oriented x 4  -ES    Row Name 09/15/22 1342 09/15/22 1003       Safety Issues, Functional Mobility    Safety Issues  Affecting Function (Mobility) safety precaution awareness;safety precautions follow-through/compliance  -ES positioning of assistive device;safety precaution awareness;safety precautions follow-through/compliance  -ES    Impairments Affecting Function (Mobility) strength;pain;endurance/activity tolerance;range of motion (ROM)  -ES strength;pain;endurance/activity tolerance;range of motion (ROM)  -ES          User Key  (r) = Recorded By, (t) = Taken By, (c) = Cosigned By    Initials Name Provider Type    ES Amanda De Jesus PT Physical Therapist               Mobility     Row Name 09/15/22 1344 09/15/22 1004       Bed Mobility    Comment, (Bed Mobility) Received UI  -ES UIC  -ES    Row Name 09/15/22 1344          Transfers    Comment, (Transfers) VC for sequencing.  -ES     Row Name 09/15/22 1344 09/15/22 1004       Sit-Stand Transfer    Sit-Stand [place] (Transfers) contact guard;verbal cues;1 person assist  -ES contact guard;verbal cues;1 person assist  -ES    Assistive Device (Sit-Stand Transfers) walker, front-wheeled  -ES walker, front-wheeled  -ES    Comment, (Sit-Stand Transfer) -- Demo'd improved sequencing this date  -ES    Row Name 09/15/22 1344 09/15/22 1004       Gait/Stairs (Locomotion)    [place]ner Level (Gait) verbal cues;contact guard;1 person assist  -ES verbal cues;contact guard;1 person assist  -ES    Assistive Device (Gait) -- walker, front-wheeled  -ES    Distance in Feet (Gait) 200  -  -ES    Deviations/Abnormal Patterns (Gait) bilateral deviations;chelsy decreased;gait speed decreased;stride length decreased;left sided deviations;antalgic  -ES bilateral deviations;chelsy decreased;gait speed decreased;stride length decreased;left sided deviations;antalgic  -ES    Bilateral Gait Deviations heel strike decreased  -ES --    Left Sided Gait Deviations weight shift ability decreased;heel strike decreased  -ES weight shift ability decreased;heel strike decreased  -ES    [place]ner  Level (Stairs) contact guard;verbal cues  -ES not tested  -ES    Assistive Device (Stairs) other (see comments)  B rails  -ES --    Number of Steps (Stairs) 5  -ES --    Ascending Technique (Stairs) step-to-step  -ES --    Descending Technique (Stairs) step-to-step  -ES --    Stairs, Impairments ROM decreased;strength decreased;pain  -ES --    Comment, (Gait/Stairs) Pt amb 200' with improved heel-toe gait pattern since this AM. Required VC to properly navigate up/down 5 steps but tolerated session well.  -ES Pt amb 350' with proper use of FWW and CGA. VC for heel-toe gait pattern. Will attempt stairs this PM.  -ES    Row Name 09/15/22 1344 09/15/22 1004       Mobility    Extremity Weight-bearing Status left lower extremity  -ES left lower extremity  -ES    Left Lower Extremity (Weight-bearing Status) weight-bearing as tolerated (WBAT)  -ES weight-bearing as tolerated (WBAT)  -ES          User Key  (r) = Recorded By, (t) = Taken By, (c) = Cosigned By    Initials Name Provider Type    ES Amanda De Jesus, PT Physical Therapist               Obj/Interventions     Row Name 09/15/22 1007          Motor Skills    Therapeutic Exercise hip;knee;ankle  -ES     Row Name 09/15/22 1007          Hip (Therapeutic Exercise)    Hip (Therapeutic Exercise) strengthening exercise  -ES     Hip Strengthening (Therapeutic Exercise) heel slides;left;10 repetitions  -ES     Row Name 09/15/22 1007          Knee (Therapeutic Exercise)    Knee (Therapeutic Exercise) strengthening exercise  -ES     Knee Isometrics (Therapeutic Exercise) left;quad sets;10 repetitions  -ES     Knee Strengthening (Therapeutic Exercise) left;SAQ (short arc quad);LAQ (long arc quad);heel slides;10 repetitions  -ES     Row Name 09/15/22 1007          Ankle (Therapeutic Exercise)    Ankle (Therapeutic Exercise) AROM (active range of motion)  -ES     Ankle AROM (Therapeutic Exercise) bilateral;dorsiflexion;plantarflexion  -ES     Row Name 09/15/22 1346 09/15/22 1007        Balance    Balance Assessment sitting static balance;sitting dynamic balance;sit to stand dynamic balance;standing static balance;standing dynamic balance  -ES sitting static balance;sitting dynamic balance;sit to stand dynamic balance;standing static balance;standing dynamic balance  -ES    Static Sitting Balance standby assist  -ES standby assist  -ES    Dynamic Sitting Balance supervision  -ES supervision  -ES    Position, Sitting Balance unsupported;sitting in chair  -ES unsupported;sitting in chair  -ES    Sit to Stand Dynamic Balance contact guard  -ES contact guard;1-person assist  -ES    Static Standing Balance contact guard;verbal cues  -ES contact guard;1-person assist  -ES    Dynamic Standing Balance contact guard  -ES contact guard;1-person assist  -ES    Position/Device Used, Standing Balance supported;walker, front-wheeled  -ES supported;walker, front-wheeled  -ES    Balance Interventions sitting;standing;sit to stand;dynamic reaching;occupation based/functional task  -ES sitting;standing;sit to stand;dynamic reaching;occupation based/functional task  -ES    Comment, Balance No LOB  -ES No LOB  -ES          User Key  (r) = Recorded By, (t) = Taken By, (c) = Cosigned By    Initials Name Provider Type    ES Amanda De Jesus PT Physical Therapist               Goals/Plan    No documentation.                Clinical Impression     Row Name 09/15/22 1347 09/15/22 1009       Pain    Pretreatment Pain Rating 6/10  -ES 4/10  -ES    Posttreatment Pain Rating 6/10  -ES 6/10  -ES    Pain Location - Side/Orientation Left  -ES Left  -ES    Pain Location lateral  -ES anterior  -ES    Pain Location - knee  -ES knee  -ES    Pain Intervention(s) Repositioned;Aromatherapy;Cold applied  -ES Cold applied;Repositioned;Ambulation/increased activity  -ES    Row Name 09/15/22 1347 09/15/22 1009       Plan of Care Review    Plan of Care Reviewed With -- patient  -ES    Progress improving  -ES improving  -ES    Outcome  Evaluation Pt ambulated 200' with CGA and FWW. Demo'd improved heel-toe gait pattern. Pt navigated up/down 5 steps with VC for proper sequencing without LOB or c/o increased pain. Pt lives alone with 3 dependent children that are unable to drive pt to appointments. Due to pt's impaired strength, ROM, activity tolerance, and inability to drive at this time, PT rec home with assist and HHPT to continued skilled care needed to return to [facility].  -ES Pt demo'd improved tolerance to activity today. Amb 350' with FWW and CGA with no LOB or c/o lightheadedness/dizziness. Required VC for heel-toe gait pattern. Reviewed HEP and protocol with pt, who demo'd good teach back of exercises. Will attempt stairs this afternoon as pt has 4 GET. PT rec home with assist and HHPT at this time.  -ES    Row Name 09/15/22 1347 09/15/22 1009       Therapy Assessment/Plan (PT)    Rehab Potential (PT) good, to achieve stated therapy goals  -ES good, to achieve stated therapy goals  -ES    Criteria for Skilled Interventions Met (PT) yes;meets criteria;skilled treatment is necessary  -ES yes;meets criteria;skilled treatment is necessary  -ES    Therapy Frequency (PT) 2 times/day  -ES 2 times/day  -ES    Row Name 09/15/22 1347 09/15/22 1009       Vital Signs    Pre Systolic BP Rehab --  VSS. cleared by RN.  -ES --  VSS. Cleared by RN.  -ES    O2 Delivery Pre Treatment room air  -ES --    O2 Delivery Intra Treatment room air  -ES --    O2 Delivery Post Treatment room air  -ES --    Pre Patient Position Sitting  -ES Sitting  -ES    Intra Patient Position Standing  -ES Standing  -ES    Post Patient Position Sitting  -ES --    Row Name 09/15/22 1347 09/15/22 1009       Positioning and Restraints    Pre-Treatment Position sitting in chair/recliner  -ES sitting in chair/recliner  -ES    Post Treatment Position chair  -ES chair  -ES    In Chair notified nsg;reclined;sitting;call light within reach;encouraged to call for assist;legs elevated;heels  elevated  -ES notified nsg;reclined;sitting;call light within reach;encouraged to call for assist;with family/caregiver;waffle cushion;legs elevated;heels elevated  -ES          User Key  (r) = Recorded By, (t) = Taken By, (c) = Cosigned By    Initials Name Provider Type    Amanda Izquierdo, JC Physical Therapist               Outcome Measures     Row Name 09/15/22 1350 09/15/22 1011       How much help from another person do you currently need...    Turning from your back to your side while in flat bed without using bedrails? 4  -ES 4  -ES    Moving from lying on back to sitting on the side of a flat bed without bedrails? 3  -ES 3  -ES    Moving to and from a bed to a chair (including a wheelchair)? 3  -ES 3  -ES    Standing up from a chair using your arms (e.g., wheelchair, bedside chair)? 3  -ES 3  -ES    Climbing 3-5 steps with a railing? 3  -ES 3  -ES    To walk in hospital room? 4  -ES 3  -ES    AM-PAC 6 Clicks Score (PT) 20  -ES 19  -ES    Highest level of mobility 6 --> Walked 10 steps or more  -ES 6 --> Walked 10 steps or more  -ES    Row Name 09/15/22 1350 09/15/22 1331       Functional Assessment    Outcome Measure Options AM-PAC 6 Clicks Basic Mobility (PT)  -ES AM-PAC 6 Clicks Daily Activity (OT)  -HK    Row Name 09/15/22 1011          Functional Assessment    Outcome Measure Options AM-PAC 6 Clicks Basic Mobility (PT)  -ES           User Key  (r) = Recorded By, (t) = Taken By, (c) = Cosigned By    Initials Name Provider Type     Gloria Colbert, OT Occupational Therapist    Amanda Izquierdo PT Physical Therapist                             Physical Therapy Education                 Title: PT OT SLP Therapies (In Progress)     Topic: Physical Therapy (Done)     Point: Mobility training (Done)     Learning Progress Summary           Patient Acceptance, E,D,H, VU,NR by LR at 9/14/2022 1301    Comment: Issued/reviewed written/illustrated HEP. Educated on precautions, weight bearing status, correct  supine to sit t/f technique, correct sit<->stand t/f technique, correct gait mechanics, safety with mobility, and progression of POC.   Family Acceptance, E,D,H, VU,NR by LR at 9/14/2022 1301    Comment: Issued/reviewed written/illustrated HEP. Educated on precautions, weight bearing status, correct supine to sit t/f technique, correct sit<->stand t/f technique, correct gait mechanics, safety with mobility, and progression of POC.                   Point: Home exercise program (Done)     Learning Progress Summary           Patient Acceptance, E,D,H, VU,NR by LR at 9/14/2022 1301    Comment: Issued/reviewed written/illustrated HEP. Educated on precautions, weight bearing status, correct supine to sit t/f technique, correct sit<->stand t/f technique, correct gait mechanics, safety with mobility, and progression of POC.   Family Acceptance, E,D,H, VU,NR by LR at 9/14/2022 1301    Comment: Issued/reviewed written/illustrated HEP. Educated on precautions, weight bearing status, correct supine to sit t/f technique, correct sit<->stand t/f technique, correct gait mechanics, safety with mobility, and progression of POC.                   Point: Body mechanics (Done)     Learning Progress Summary           Patient Acceptance, E,D,H, VU,NR by LR at 9/14/2022 1301    Comment: Issued/reviewed written/illustrated HEP. Educated on precautions, weight bearing status, correct supine to sit t/f technique, correct sit<->stand t/f technique, correct gait mechanics, safety with mobility, and progression of POC.   Family Acceptance, E,D,H, VU,NR by LR at 9/14/2022 1301    Comment: Issued/reviewed written/illustrated HEP. Educated on precautions, weight bearing status, correct supine to sit t/f technique, correct sit<->stand t/f technique, correct gait mechanics, safety with mobility, and progression of POC.                   Point: Precautions (Done)     Learning Progress Summary           Patient Acceptance, E,D,H, VU,NR by LR at  9/14/2022 1301    Comment: Issued/reviewed written/illustrated HEP. Educated on precautions, weight bearing status, correct supine to sit t/f technique, correct sit<->stand t/f technique, correct gait mechanics, safety with mobility, and progression of POC.   Family Acceptance, E,D,H, VU,NR by LR at 9/14/2022 1301    Comment: Issued/reviewed written/illustrated HEP. Educated on precautions, weight bearing status, correct supine to sit t/f technique, correct sit<->stand t/f technique, correct gait mechanics, safety with mobility, and progression of POC.                               User Key     Initials Effective Dates Name Provider Type Discipline    LR 06/16/21 -  Christiana Chiang, PT Physical Therapist PT              PT Recommendation and Plan     Plan of Care Reviewed With: patient  Progress: improving  Outcome Evaluation: Pt ambulated 200' with CGA and FWW. Demo'd improved heel-toe gait pattern. Pt navigated up/down 5 steps with VC for proper sequencing without LOB or c/o increased pain. Pt lives alone with 3 dependent children that are unable to drive pt to appointments. Due to pt's impaired strength, ROM, activity tolerance, and inability to drive at this time, PT rec home with assist and HHPT to continued skilled care needed to return to [facility].     Time Calculation:    PT Charges     Row Name 09/15/22 1351 09/15/22 1012          Time Calculation    Start Time 1321  -ES 0930  -ES     PT Received On 09/15/22  -ES 09/15/22  -ES     PT Goal Re-Cert Due Date 09/24/22  -ES 09/24/22  -ES            Time Calculation- PT    Total Timed Code Minutes- PT 17 minute(s)  -ES 26 minute(s)  -ES            Timed Charges    10513 - PT Therapeutic Exercise Minutes -- 13  -ES     25775 - Gait Training Minutes  17  -ES 13  -ES            Total Minutes    Timed Charges Total Minutes 17  -ES 26  -ES      Total Minutes 17  -ES 26  -ES           User Key  (r) = Recorded By, (t) = Taken By, (c) = Cosigned By    Initials Name  Provider Type     Amanda De Jesus PT Physical Therapist              Therapy Charges for Today     Code Description Service Date Service Provider Modifiers Qty    38354284909 HC PT THER PROC EA 15 MIN 9/15/2022 Amanda De Jesus, PT GP 1    13659935499 HC GAIT TRAINING EA 15 MIN 9/15/2022 Amanda De Jesus, PT GP 1    66547293356 HC GAIT TRAINING EA 15 MIN 9/15/2022 Amanda De Jesus, PT GP 1          PT G-Codes  Outcome Measure Options: AM-PAC 6 Clicks Basic Mobility (PT)  AM-PAC 6 Clicks Score (PT): 20  AM-PAC 6 Clicks Score (OT): 20    Amanda De Jesus PT  9/15/2022

## 2022-09-15 NOTE — PLAN OF CARE
Goal Outcome Evaluation:                 Problem: Adult Inpatient Plan of Care  Goal: Absence of Hospital-Acquired Illness or Injury  Intervention: Identify and Manage Fall Risk  Recent Flowsheet Documentation  Taken 9/15/2022 0400 by Yuriy Gil RN  Safety Promotion/Fall Prevention:   activity supervised   safety round/check completed   toileting scheduled  Taken 9/15/2022 0200 by Yuriy Gil RN  Safety Promotion/Fall Prevention:   activity supervised   safety round/check completed   toileting scheduled  Taken 9/15/2022 0000 by Yuriy Gil RN  Safety Promotion/Fall Prevention:   activity supervised   safety round/check completed   toileting scheduled  Taken 9/14/2022 2200 by Yuriy Gil RN  Safety Promotion/Fall Prevention:   activity supervised   safety round/check completed   toileting scheduled  Taken 9/14/2022 2000 by Yuriy Gil RN  Safety Promotion/Fall Prevention:   activity supervised   safety round/check completed   toileting scheduled  Intervention: Prevent Skin Injury  Recent Flowsheet Documentation  Taken 9/15/2022 0400 by Yuriy Gil RN  Body Position: supine  Taken 9/15/2022 0200 by Yuriy Gil RN  Body Position: supine  Taken 9/15/2022 0000 by Yuriy Gil RN  Body Position: supine  Taken 9/14/2022 2200 by Yuriy Gil RN  Body Position: supine  Taken 9/14/2022 2000 by Yuriy Gil RN  Body Position: supine  Intervention: Prevent and Manage VTE (Venous Thromboembolism) Risk  Recent Flowsheet Documentation  Taken 9/15/2022 0400 by Yuriy Gil RN  VTE Prevention/Management:   bilateral   sequential compression devices on  Taken 9/15/2022 0200 by Yuriy Gil RN  VTE Prevention/Management:   bilateral   sequential compression devices on  Taken 9/15/2022 0000 by Yuriy Gil RN  VTE Prevention/Management:   bilateral   sequential compression devices on  Taken 9/14/2022 2200 by Yuriy Gil RN  VTE Prevention/Management:   bilateral    sequential compression devices on  Taken 9/14/2022 2000 by Yuriy Gil RN  VTE Prevention/Management:   bilateral   sequential compression devices on  Intervention: Prevent Infection  Recent Flowsheet Documentation  Taken 9/15/2022 0400 by Yuriy Gil RN  Infection Prevention: environmental surveillance performed  Taken 9/15/2022 0200 by Yuriy Gil RN  Infection Prevention: environmental surveillance performed  Taken 9/15/2022 0000 by Yuriy Gil RN  Infection Prevention: environmental surveillance performed  Taken 9/14/2022 2200 by Yuriy Gil RN  Infection Prevention: environmental surveillance performed  Taken 9/14/2022 2000 by Yuriy Gil RN  Infection Prevention: environmental surveillance performed  Goal: Optimal Comfort and Wellbeing  Intervention: Monitor Pain and Promote Comfort  Recent Flowsheet Documentation  Taken 9/15/2022 0400 by Yuriy Gil RN  Pain Management Interventions: quiet environment facilitated  Taken 9/15/2022 0200 by Yuriy Gil RN  Pain Management Interventions: quiet environment facilitated  Taken 9/15/2022 0000 by Yuriy Gil RN  Pain Management Interventions: quiet environment facilitated  Taken 9/14/2022 2200 by Yuriy Gil RN  Pain Management Interventions: quiet environment facilitated  Taken 9/14/2022 2000 by Yuriy Gil RN  Pain Management Interventions: quiet environment facilitated  Intervention: Provide Person-Centered Care  Recent Flowsheet Documentation  Taken 9/15/2022 0400 by Yuriy Gil RN  Trust Relationship/Rapport: care explained  Taken 9/15/2022 0200 by Yuriy Gil RN  Trust Relationship/Rapport: care explained  Taken 9/15/2022 0000 by Yuriy Gil RN  Trust Relationship/Rapport: care explained  Taken 9/14/2022 2200 by Yuriy Gil RN  Trust Relationship/Rapport: care explained  Taken 9/14/2022 2000 by Yuriy Gil RN  Trust Relationship/Rapport: care explained     Problem: Fall Injury  Risk  Goal: Absence of Fall and Fall-Related Injury  Intervention: Identify and Manage Contributors  Recent Flowsheet Documentation  Taken 9/15/2022 0400 by Yuriy Gil RN  Medication Review/Management: medications reviewed  Taken 9/15/2022 0200 by Yuriy Gil RN  Medication Review/Management: medications reviewed  Taken 9/15/2022 0000 by Yuriy Gil RN  Medication Review/Management: medications reviewed  Taken 9/14/2022 2200 by Yuriy Gil RN  Medication Review/Management: medications reviewed  Taken 9/14/2022 2000 by Yuriy Gil RN  Medication Review/Management: medications reviewed  Intervention: Promote Injury-Free Environment  Recent Flowsheet Documentation  Taken 9/15/2022 0400 by Yuriy Gil RN  Safety Promotion/Fall Prevention:   activity supervised   safety round/check completed   toileting scheduled  Taken 9/15/2022 0200 by Yuriy Gil RN  Safety Promotion/Fall Prevention:   activity supervised   safety round/check completed   toileting scheduled  Taken 9/15/2022 0000 by Yuriy Gil RN  Safety Promotion/Fall Prevention:   activity supervised   safety round/check completed   toileting scheduled  Taken 9/14/2022 2200 by Yuriy Gil RN  Safety Promotion/Fall Prevention:   activity supervised   safety round/check completed   toileting scheduled  Taken 9/14/2022 2000 by Yuriy Gil RN  Safety Promotion/Fall Prevention:   activity supervised   safety round/check completed   toileting scheduled     Problem: Infection (Knee Arthroplasty)  Goal: Absence of Infection Signs and Symptoms  Intervention: Prevent or Manage Infection  Recent Flowsheet Documentation  Taken 9/15/2022 0400 by Yuriy Gil RN  Infection Prevention: environmental surveillance performed  Taken 9/15/2022 0200 by Yuriy Gil RN  Infection Prevention: environmental surveillance performed  Taken 9/15/2022 0000 by Yuriy Gil RN  Infection Prevention: environmental surveillance  performed  Taken 9/14/2022 2200 by Yuriy Gil RN  Infection Prevention: environmental surveillance performed  Taken 9/14/2022 2000 by Yuriy Gil RN  Infection Prevention: environmental surveillance performed     Problem: Ongoing Anesthesia Effects (Knee Arthroplasty)  Goal: Anesthesia/Sedation Recovery  Intervention: Optimize Anesthesia Recovery  Recent Flowsheet Documentation  Taken 9/15/2022 0400 by Yuriy Gil RN  Safety Promotion/Fall Prevention:   activity supervised   safety round/check completed   toileting scheduled  Taken 9/15/2022 0200 by Yuriy Gil RN  Safety Promotion/Fall Prevention:   activity supervised   safety round/check completed   toileting scheduled  Taken 9/15/2022 0000 by Yuriy Gil RN  Safety Promotion/Fall Prevention:   activity supervised   safety round/check completed   toileting scheduled  Taken 9/14/2022 2200 by Yuriy Gil RN  Safety Promotion/Fall Prevention:   activity supervised   safety round/check completed   toileting scheduled  Taken 9/14/2022 2000 by Yuriy Gil RN  Safety Promotion/Fall Prevention:   activity supervised   safety round/check completed   toileting scheduled     Problem: Pain (Knee Arthroplasty)  Goal: Acceptable Pain Control  Intervention: Prevent or Manage Pain  Recent Flowsheet Documentation  Taken 9/15/2022 0400 by Yuriy Gil RN  Pain Management Interventions: quiet environment facilitated  Taken 9/15/2022 0200 by Yuriy Gil RN  Pain Management Interventions: quiet environment facilitated  Taken 9/15/2022 0000 by Yuriy Gil RN  Pain Management Interventions: quiet environment facilitated  Taken 9/14/2022 2200 by Yuriy Gil RN  Pain Management Interventions: quiet environment facilitated  Taken 9/14/2022 2000 by Yuriy Gil RN  Pain Management Interventions: quiet environment facilitated     Problem: Respiratory Compromise (Knee Arthroplasty)  Goal: Effective Oxygenation and  Ventilation  Intervention: Optimize Oxygenation and Ventilation  Recent Flowsheet Documentation  Taken 9/15/2022 0400 by Yuriy Gil RN  Head of Bed (HOB) Positioning: HOB at 20-30 degrees  Taken 9/15/2022 0200 by Yuriy Gil RN  Head of Bed (HOB) Positioning: HOB at 20-30 degrees  Taken 9/15/2022 0000 by Yuriy Gil RN  Head of Bed (HOB) Positioning: HOB at 20-30 degrees  Taken 9/14/2022 2200 by Yuriy Gil RN  Head of Bed (HOB) Positioning: HOB at 20-30 degrees  Taken 9/14/2022 2000 by Yuriy Gil RN  Head of Bed (HOB) Positioning: HOB at 30-45 degrees     VSS, voids well, ambulates to bathroom, rested throughout the night, pain managed with PRN medications, will continue to monitor for changes.

## 2022-09-15 NOTE — THERAPY TREATMENT NOTE
Patient Name: Sully Yost  : 1975    MRN: [id number]                              Today's Date: 9/15/2022       Admit Date: 2022    Visit Dx:     ICD-10-CM ICD-9-CM   1. Primary osteoarthritis of left knee  M17.12 715.16     Patient Active Problem List   Diagnosis   • Primary osteoarthritis of left knee   • Status post total left knee replacement   • Asthma     Past Medical History:   Diagnosis Date   • Anemia    • Anesthesia complication     per pt, she stopped breathing on table during cyst removal and required resucitation   • Anxiety    • Arthritis    • Asthma    • Depression    • GERD (gastroesophageal reflux disease)    • Heart murmur    • Migraine    • Sleep apnea     self-diagnosed, has not had study to confirm     Past Surgical History:   Procedure Laterality Date   • CYST REMOVAL     • TOTAL KNEE ARTHROPLASTY Left 2022    Procedure: TOTAL KNEE ARTHROPLASTY LEFT;  Surgeon: Cayetano King MD;  Location: [facility];  Service: Orthopedics;  Laterality: Left;   • VAGINAL DELIVERY      x5      General Information     Row Name 09/15/22 1003          Physical Therapy Time and Intention    Document Type therapy note (daily note)  -ES     Mode of Treatment physical therapy  -ES     Row Name 09/15/22 1003          General Information    Patient Profile Reviewed yes  -ES     Existing Precautions/Restrictions fall;other (see comments)  L adductor canal nerve catheter  -ES     Barriers to Rehab previous functional deficit  -ES     Row Name 09/15/22 1003          Cognition    Orientation Status (Cognition) oriented x 4  -ES     Row Name 09/15/22 1003          Safety Issues, Functional Mobility    Safety Issues Affecting Function (Mobility) positioning of assistive device;safety precaution awareness;safety precautions follow-through/compliance  -ES     Impairments Affecting Function (Mobility) strength;pain;endurance/activity tolerance;range of motion (ROM)  -ES           User Key  (r) =  Recorded By, (t) = Taken By, (c) = Cosigned By    Initials Name Provider Type    ES Amanda De Jesus PT Physical Therapist               Mobility     Row Name 09/15/22 1004          Bed Mobility    Comment, (Bed Mobility) UIC  -ES     Row Name 09/15/22 1004          Sit-Stand Transfer    Sit-Stand [place] (Transfers) contact guard;verbal cues;1 person assist  -ES     Assistive Device (Sit-Stand Transfers) walker, front-wheeled  -ES     Comment, (Sit-Stand Transfer) Demo'd improved sequencing this date  -ES     Row Name 09/15/22 1004          Gait/Stairs (Locomotion)    [place] Level (Gait) verbal cues;contact guard;1 person assist  -ES     Assistive Device (Gait) walker, front-wheeled  -ES     Distance in Feet (Gait) 350  -ES     Deviations/Abnormal Patterns (Gait) bilateral deviations;chelsy decreased;gait speed decreased;stride length decreased;left sided deviations;antalgic  -ES     Left Sided Gait Deviations weight shift ability decreased;heel strike decreased  -ES     [place] Level (Stairs) not tested  -ES     Comment, (Gait/Stairs) Pt amb 350' with proper use of FWW and CGA. VC for heel-toe gait pattern. Will attempt stairs this PM.  -ES     Row Name 09/15/22 1004          Mobility    Extremity Weight-bearing Status left lower extremity  -ES     Left Lower Extremity (Weight-bearing Status) weight-bearing as tolerated (WBAT)  -ES           User Key  (r) = Recorded By, (t) = Taken By, (c) = Cosigned By    Initials Name Provider Type    ES Amanda De Jesus PT Physical Therapist               Obj/Interventions     Row Name 09/15/22 1007          Motor Skills    Therapeutic Exercise hip;knee;ankle  -ES     Row Name 09/15/22 1007          Hip (Therapeutic Exercise)    Hip (Therapeutic Exercise) strengthening exercise  -ES     Hip Strengthening (Therapeutic Exercise) heel slides;left;10 repetitions  -ES     Row Name 09/15/22 1007          Knee (Therapeutic Exercise)    Knee (Therapeutic Exercise)  strengthening exercise  -ES     Knee Isometrics (Therapeutic Exercise) left;quad sets;10 repetitions  -ES     Knee Strengthening (Therapeutic Exercise) left;SAQ (short arc quad);LAQ (long arc quad);heel slides;10 repetitions  -ES     Row Name 09/15/22 1007          Ankle (Therapeutic Exercise)    Ankle (Therapeutic Exercise) AROM (active range of motion)  -ES     Ankle AROM (Therapeutic Exercise) bilateral;dorsiflexion;plantarflexion  -ES     Row Name 09/15/22 1007          Balance    Balance Assessment sitting static balance;sitting dynamic balance;sit to stand dynamic balance;standing static balance;standing dynamic balance  -ES     Static Sitting Balance standby assist  -ES     Dynamic Sitting Balance supervision  -ES     Position, Sitting Balance unsupported;sitting in chair  -ES     Sit to Stand Dynamic Balance contact guard;1-person assist  -ES     Static Standing Balance contact guard;1-person assist  -ES     Dynamic Standing Balance contact guard;1-person assist  -ES     Position/Device Used, Standing Balance supported;walker, front-wheeled  -ES     Balance Interventions sitting;standing;sit to stand;dynamic reaching;occupation based/functional task  -ES     Comment, Balance No LOB  -ES           User Key  (r) = Recorded By, (t) = Taken By, (c) = Cosigned By    Initials Name Provider Type    ES Amanda De Jesus, PT Physical Therapist               Goals/Plan    No documentation.                Clinical Impression     Row Name 09/15/22 1009          Pain    Pretreatment Pain Rating 4/10  -ES     Posttreatment Pain Rating 6/10  -ES     Pain Location - Side/Orientation Left  -ES     Pain Location anterior  -ES     Pain Location - knee  -ES     Pain Intervention(s) Cold applied;Repositioned;Ambulation/increased activity  -ES     Row Name 09/15/22 1009          Plan of Care Review    Plan of Care Reviewed With patient  -ES     Progress improving  -ES     Outcome Evaluation Pt demo'd improved tolerance to activity  today. Amb 350' with FWW and CGA with no LOB or c/o lightheadedness/dizziness. Required VC for heel-toe gait pattern. Reviewed HEP and protocol with pt, who demo'd good teach back of exercises. Will attempt stairs this afternoon as pt has 4 GET. PT rec home with assist and HHPT at this time.  -ES     Row Name 09/15/22 1009          Therapy Assessment/Plan (PT)    Rehab Potential (PT) good, to achieve stated therapy goals  -ES     Criteria for Skilled Interventions Met (PT) yes;meets criteria;skilled treatment is necessary  -ES     Therapy Frequency (PT) 2 times/day  -ES     Row Name 09/15/22 1009          Vital Signs    Pre Systolic BP Rehab --  VSS. Cleared by RN.  -ES     Pre Patient Position Sitting  -ES     Intra Patient Position Standing  -ES     Row Name 09/15/22 1009          Positioning and Restraints    Pre-Treatment Position sitting in chair/recliner  -ES     Post Treatment Position chair  -ES     In Chair notified nsg;reclined;sitting;call light within reach;encouraged to call for assist;with family/caregiver;waffle cushion;legs elevated;heels elevated  -ES           User Key  (r) = Recorded By, (t) = Taken By, (c) = Cosigned By    Initials Name Provider Type    Amanda Izquierdo, PT Physical Therapist               Outcome Measures     Row Name 09/15/22 1011          How much help from another person do you currently need...    Turning from your back to your side while in flat bed without using bedrails? 4  -ES     Moving from lying on back to sitting on the side of a flat bed without bedrails? 3  -ES     Moving to and from a bed to a chair (including a wheelchair)? 3  -ES     Standing up from a chair using your arms (e.g., wheelchair, bedside chair)? 3  -ES     Climbing 3-5 steps with a railing? 3  -ES     To walk in hospital room? 3  -ES     AM-PAC 6 Clicks Score (PT) 19  -ES     Highest level of mobility 6 --> Walked 10 steps or more  -ES     Row Name 09/15/22 1011          Functional Assessment     Outcome Measure Options AM-PAC 6 Clicks Basic Mobility (PT)  -ES           User Key  (r) = Recorded By, (t) = Taken By, (c) = Cosigned By    Initials Name Provider Type    Amanda Izquierdo PT Physical Therapist                             Physical Therapy Education                 Title: PT OT SLP Therapies (Done)     Topic: Physical Therapy (Done)     Point: Mobility training (Done)     Learning Progress Summary           Patient Acceptance, E,D,H, VU,NR by LR at 9/14/2022 1301    Comment: Issued/reviewed written/illustrated HEP. Educated on precautions, weight bearing status, correct supine to sit t/f technique, correct sit<->stand t/f technique, correct gait mechanics, safety with mobility, and progression of POC.   Family Acceptance, E,D,H, VU,NR by LR at 9/14/2022 1301    Comment: Issued/reviewed written/illustrated HEP. Educated on precautions, weight bearing status, correct supine to sit t/f technique, correct sit<->stand t/f technique, correct gait mechanics, safety with mobility, and progression of POC.                   Point: Home exercise program (Done)     Learning Progress Summary           Patient Acceptance, E,D,H, VU,NR by LR at 9/14/2022 1301    Comment: Issued/reviewed written/illustrated HEP. Educated on precautions, weight bearing status, correct supine to sit t/f technique, correct sit<->stand t/f technique, correct gait mechanics, safety with mobility, and progression of POC.   Family Acceptance, E,D,H, VU,NR by LR at 9/14/2022 1301    Comment: Issued/reviewed written/illustrated HEP. Educated on precautions, weight bearing status, correct supine to sit t/f technique, correct sit<->stand t/f technique, correct gait mechanics, safety with mobility, and progression of POC.                   Point: Body mechanics (Done)     Learning Progress Summary           Patient Acceptance, E,D,H, VU,NR by LR at 9/14/2022 1301    Comment: Issued/reviewed written/illustrated HEP. Educated on precautions,  weight bearing status, correct supine to sit t/f technique, correct sit<->stand t/f technique, correct gait mechanics, safety with mobility, and progression of POC.   Family Acceptance, E,D,H, VU,NR by LR at 9/14/2022 1301    Comment: Issued/reviewed written/illustrated HEP. Educated on precautions, weight bearing status, correct supine to sit t/f technique, correct sit<->stand t/f technique, correct gait mechanics, safety with mobility, and progression of POC.                   Point: Precautions (Done)     Learning Progress Summary           Patient Acceptance, E,D,H, VU,NR by LR at 9/14/2022 1301    Comment: Issued/reviewed written/illustrated HEP. Educated on precautions, weight bearing status, correct supine to sit t/f technique, correct sit<->stand t/f technique, correct gait mechanics, safety with mobility, and progression of POC.   Family Acceptance, E,D,H, VU,NR by LR at 9/14/2022 1301    Comment: Issued/reviewed written/illustrated HEP. Educated on precautions, weight bearing status, correct supine to sit t/f technique, correct sit<->stand t/f technique, correct gait mechanics, safety with mobility, and progression of POC.                               User Key     Initials Effective Dates Name Provider Type Discipline    LR 06/16/21 -  Christiana Chiang, PT Physical Therapist PT              PT Recommendation and Plan     Plan of Care Reviewed With: patient  Progress: improving  Outcome Evaluation: Pt demo'd improved tolerance to activity today. Amb 350' with FWW and CGA with no LOB or c/o lightheadedness/dizziness. Required VC for heel-toe gait pattern. Reviewed HEP and protocol with pt, who demo'd good teach back of exercises. Will attempt stairs this afternoon as pt has 4 GET. PT rec home with assist and HHPT at this time.     Time Calculation:    PT Charges     Row Name 09/15/22 1012             Time Calculation    Start Time 0930  -ES      PT Received On 09/15/22  -ES      PT Goal Re-Cert Due  Date 09/24/22  -ES              Time Calculation- PT    Total Timed Code Minutes- PT 26 minute(s)  -ES              Timed Charges    18939 - PT Therapeutic Exercise Minutes 13  -ES      70703 - Gait Training Minutes  13  -ES              Total Minutes    Timed Charges Total Minutes 26  -ES       Total Minutes 26  -ES            User Key  (r) = Recorded By, (t) = Taken By, (c) = Cosigned By    Initials Name Provider Type    ES Amanda De Jesus, JC Physical Therapist              Therapy Charges for Today     Code Description Service Date Service Provider Modifiers Qty    07655566699 HC PT THER PROC EA 15 MIN 9/15/2022 Amanda De Jesus, PT GP 1    80834145722 HC GAIT TRAINING EA 15 MIN 9/15/2022 Amanda De Jesus, PT GP 1          PT G-Codes  Outcome Measure Options: AM-PAC 6 Clicks Basic Mobility (PT)  AM-PAC 6 Clicks Score (PT): 19    Amanda De Jesus PT  9/15/2022

## 2022-09-15 NOTE — PROGRESS NOTES
Met with patient she is agreeable to [facility]. Dr King will sign home health orders per CM. Maisha CORONA, [facility]-Liaison

## 2022-09-15 NOTE — PLAN OF CARE
Goal Outcome Evaluation:  Plan of Care Reviewed With: patient        Progress: improving  Outcome Evaluation: Pt ambulated 200' with CGA and FWW. Demo'd improved heel-toe gait pattern. Pt navigated up/down 5 steps with VC for proper sequencing without LOB or c/o increased pain. Pt lives alone with 3 dependent children that are unable to drive pt to appointments. Due to pt's impaired strength, ROM, activity tolerance, and inability to drive at this time, PT rec home with assist and HHPT to continued skilled care needed to return to PLOF.

## 2022-09-15 NOTE — PROGRESS NOTES
[facility]    Acute pain service Inpatient Progress Note    Patient Name: Sully Yost  :  1975  MRN:  [id number]        Acute Pain  Service Inpatient Progress Note:    Analgesia:Excellent  Pain Score:3/10  LOC: alert and awake  Resp Status: room air  Cardiac: VS stable  Side Effects:None  Catheter Site:clean, dressing intact and dry  Cath type: peripheral nerve cath with ON Q  Volume: 0.5mL,15ml, 5ml InfuSystem Pump.  Catheter Plan:Catheter to remain Insitu and Continue catheter infusion rate unchanged  Comments:   Patient doing very well today, she complains of some lateral knee pain.  I described to her that the lateral side of her knee will require an additional pain pill to manage the pain as the nerve catheter does not reach that area.  I anticipate she will be discharged home with the infusion pump when ready.

## 2022-09-15 NOTE — CASE MANAGEMENT/SOCIAL WORK
Case Management Discharge Note      Final Note: Case mgt f/u. I met with Ms Yost and son at bedside. she states she lives alone,but her son is her short term to assist. She states she has no transporation(son does not drive). Discussed with Dr King via epic chat, approval given for Home Health. Referred to [facility] for home PT. I spoke with Maisha who accepted referral. Rolling walker and BSC provided from [employer]. Friend will transport home. No other d/c needs identified         Selected Continued Care - Admitted Since 9/14/2022     Destination    No services have been selected for the patient.              Durable Medical Equipment Coordination complete.    Service Provider Selected Services Address Phone Fax Patient Preferred    [facility]  D[facility] Medical Equipment [address], [place] [id number] [phone] --          Dialysis/Infusion    No services have been selected for the patient.              Home Medical Care Coordination complete.    Service Provider Selected Services Address Phone Fax Patient Preferred    [facility]  Home Rehabilitation [address], [facility] [id number] [phone] --          Therapy    No services have been selected for the patient.              Community Resources    No services have been selected for the patient.              Community & DME    No services have been selected for the patient.                       Final Discharge Disposition Code: 06 - home with home health care

## 2022-09-15 NOTE — PLAN OF CARE
Goal Outcome Evaluation:    Problem: Adult Inpatient Plan of Care  Goal: Plan of Care Review  Outcome: Met  Goal: Patient-Specific Goal (Individualized)  Outcome: Met  Goal: Absence of Hospital-Acquired Illness or Injury  Outcome: Met  Intervention: Identify and Manage Fall Risk  Recent Flowsheet Documentation  Taken 9/15/2022 1200 by Michelle Park RN  Safety Promotion/Fall Prevention:   activity supervised   assistive device/personal items within reach   clutter free environment maintained   elopement precautions   fall prevention program maintained   gait belt   nonskid shoes/slippers when out of bed   room organization consistent   safety round/check completed  Taken 9/15/2022 1004 by Michelle Park RN  Safety Promotion/Fall Prevention:   activity supervised   assistive device/personal items within reach   clutter free environment maintained   elopement precautions   fall prevention program maintained   gait belt   nonskid shoes/slippers when out of bed   room organization consistent   safety round/check completed  Taken 9/15/2022 0756 by Michelle Park RN  Safety Promotion/Fall Prevention:   activity supervised   assistive device/personal items within reach   clutter free environment maintained   elopement precautions   fall prevention program maintained   gait belt   nonskid shoes/slippers when out of bed   room organization consistent   safety round/check completed  Intervention: Prevent Skin Injury  Recent Flowsheet Documentation  Taken 9/15/2022 1200 by Michelle Park RN  Body Position: position changed independently  Skin Protection:   adhesive use limited   incontinence pads utilized   transparent dressing maintained   tubing/devices free from skin contact  Taken 9/15/2022 1004 by Michelle Park RN  Body Position: weight shifting  Skin Protection:   adhesive use limited   incontinence pads utilized   transparent dressing maintained   tubing/devices free from skin contact  Taken  9/15/2022 0756 by Michelle Park RN  Body Position: supine, legs elevated  Skin Protection:   adhesive use limited   incontinence pads utilized   transparent dressing maintained   tubing/devices free from skin contact  Intervention: Prevent and Manage VTE (Venous Thromboembolism) Risk  Recent Flowsheet Documentation  Taken 9/15/2022 1200 by Michelle Park RN  Activity Management: up in chair  VTE Prevention/Management:   bilateral   sequential compression devices on  Taken 9/15/2022 1004 by Michelle Park RN  Activity Management: up in chair  VTE Prevention/Management:   bilateral   sequential compression devices on  Taken 9/15/2022 0756 by Michelle Park RN  Activity Management: up in chair  VTE Prevention/Management:   bilateral   sequential compression devices on  Intervention: Prevent Infection  Recent Flowsheet Documentation  Taken 9/15/2022 1200 by Michelle Park RN  Infection Prevention: environmental surveillance performed  Taken 9/15/2022 1004 by Michelle Park RN  Infection Prevention: environmental surveillance performed  Taken 9/15/2022 0756 by Michelle Park RN  Infection Prevention: environmental surveillance performed  Goal: Optimal Comfort and Wellbeing  Outcome: Met  Intervention: Monitor Pain and Promote Comfort  Recent Flowsheet Documentation  Taken 9/15/2022 1004 by Michelle Park RN  Pain Management Interventions: see MAR  Intervention: Provide Person-Centered Care  Recent Flowsheet Documentation  Taken 9/15/2022 1200 by Michelle Park RN  Trust Relationship/Rapport: care explained  Taken 9/15/2022 1004 by Michelle Park RN  Trust Relationship/Rapport: care explained  Taken 9/15/2022 0756 by Michelle Park RN  Trust Relationship/Rapport: care explained  Goal: Readiness for Transition of Care  Outcome: Met     Problem: Fall Injury Risk  Goal: Absence of Fall and Fall-Related Injury  Outcome: Met  Intervention: Promote Injury-Free Environment  Recent  Flowsheet Documentation  Taken 9/15/2022 1200 by Michelle Park RN  Safety Promotion/Fall Prevention:   activity supervised   assistive device/personal items within reach   clutter free environment maintained   elopement precautions   fall prevention program maintained   gait belt   nonskid shoes/slippers when out of bed   room organization consistent   safety round/check completed  Taken 9/15/2022 1004 by Michelle Park RN  Safety Promotion/Fall Prevention:   activity supervised   assistive device/personal items within reach   clutter free environment maintained   elopement precautions   fall prevention program maintained   gait belt   nonskid shoes/slippers when out of bed   room organization consistent   safety round/check completed  Taken 9/15/2022 0756 by Michelle Park RN  Safety Promotion/Fall Prevention:   activity supervised   assistive device/personal items within reach   clutter free environment maintained   elopement precautions   fall prevention program maintained   gait belt   nonskid shoes/slippers when out of bed   room organization consistent   safety round/check completed     Problem: Adjustment to Surgery (Knee Arthroplasty)  Goal: Optimal Coping  Outcome: Met     Problem: Bleeding (Knee Arthroplasty)  Goal: Absence of Bleeding  Outcome: Met     Problem: Bowel Motility Impaired (Knee Arthroplasty)  Goal: Effective Bowel Elimination  Outcome: Met     Problem: Fluid and Electrolyte Imbalance (Knee Arthroplasty)  Goal: Fluid and Electrolyte Balance  Outcome: Met     Problem: Functional Ability Impaired (Knee Arthroplasty)  Goal: Optimal Functional Ability  Outcome: Met  Intervention: Promote Optimal Functional Status  Recent Flowsheet Documentation  Taken 9/15/2022 1200 by Michelle Park, RN  Activity Management: up in chair  Taken 9/15/2022 1004 by Michelle Park RN  Activity Management: up in chair  Taken 9/15/2022 0756 by Michelle Park RN  Activity Management: up in chair      Problem: Infection (Knee Arthroplasty)  Goal: Absence of Infection Signs and Symptoms  Outcome: Met  Intervention: Prevent or Manage Infection  Recent Flowsheet Documentation  Taken 9/15/2022 1200 by Michelle Park RN  Infection Prevention: environmental surveillance performed  Taken 9/15/2022 1004 by Michelle Park RN  Infection Prevention: environmental surveillance performed  Taken 9/15/2022 0756 by Michelle Park RN  Infection Prevention: environmental surveillance performed     Problem: Neurovascular Compromise (Knee Arthroplasty)  Goal: Intact Neurovascular Status  Outcome: Met     Problem: Ongoing Anesthesia Effects (Knee Arthroplasty)  Goal: Anesthesia/Sedation Recovery  Outcome: Met  Intervention: Optimize Anesthesia Recovery  Recent Flowsheet Documentation  Taken 9/15/2022 1200 by Michelle Park RN  Safety Promotion/Fall Prevention:   activity supervised   assistive device/personal items within reach   clutter free environment maintained   elopement precautions   fall prevention program maintained   gait belt   nonskid shoes/slippers when out of bed   room organization consistent   safety round/check completed  Taken 9/15/2022 1004 by Michelle Park RN  Safety Promotion/Fall Prevention:   activity supervised   assistive device/personal items within reach   clutter free environment maintained   elopement precautions   fall prevention program maintained   gait belt   nonskid shoes/slippers when out of bed   room organization consistent   safety round/check completed  Taken 9/15/2022 0756 by Michelle Park RN  Safety Promotion/Fall Prevention:   activity supervised   assistive device/personal items within reach   clutter free environment maintained   elopement precautions   fall prevention program maintained   gait belt   nonskid shoes/slippers when out of bed   room organization consistent   safety round/check completed  Administration (IS): self-administered     Problem: Pain (Knee  Arthroplasty)  Goal: Acceptable Pain Control  Outcome: Met  Intervention: Prevent or Manage Pain  Recent Flowsheet Documentation  Taken 9/15/2022 1200 by Michelle Park RN  Diversional Activities: television  Taken 9/15/2022 1004 by Michelle Park RN  Pain Management Interventions: see MAR  Diversional Activities: television  Taken 9/15/2022 0756 by Michelle Park RN  Diversional Activities: television     Problem: Postoperative Nausea and Vomiting (Knee Arthroplasty)  Goal: Nausea and Vomiting Relief  Outcome: Met     Problem: Postoperative Urinary Retention (Knee Arthroplasty)  Goal: Effective Urinary Elimination  Outcome: Met     Problem: Respiratory Compromise (Knee Arthroplasty)  Goal: Effective Oxygenation and Ventilation  Outcome: Met  Intervention: Optimize Oxygenation and Ventilation  Recent Flowsheet Documentation  Taken 9/15/2022 1200 by Michelle Park RN  Head of Bed (HOB) Positioning: HOB at 30-45 degrees  Taken 9/15/2022 1004 by Michelle Park RN  Head of Bed (HOB) Positioning: HOB at 30-45 degrees  Taken 9/15/2022 0756 by Michelle Park RN  Administration (IS): self-administered  Head of Bed (HOB) Positioning: HOB at 30-45 degrees

## 2022-09-15 NOTE — PROGRESS NOTES
SUBJECTIVE  Patient resting comfortably.  Pain well controlled.  No events overnight.    PHYSICAL THERAPY PROGRESS  Outcome Evaluation: Patient ambulated 350 feet with RW, CGAx2, step through gait pattern, limited by fatigue. L knee AROM progressing well, 11-91 degrees. Recommend d/c home with family and HHPT. Encouraged frequent ambulation with nursing. Will continue to progress as able. (22 1301)     OBJECTIVE  Temp (24hrs), Av.5 °F (36.4 °C), Min:97 °F (36.1 °C), Max:98.1 °F (36.7 °C)    Blood pressure 129/82, pulse 65, temperature 98.1 °F (36.7 °C), temperature source Oral, resp. rate 16, SpO2 98 %.    Lab Results (last 24 hours)     Procedure Component Value Units Date/Time    Potassium [[id number]]  (Normal) Collected: 22 1218    Specimen: Blood Updated: 22 1323     Potassium 4.0 mmol/L             PHYSICAL EXAM  Left lower extremity: Dressing clean, dry and intact.  Able to form straight leg raise without assist.  Intact EHL, FHL, tibialis anterior, and gastrocsoleus. Sensation intact to light touch to deep peroneal, superficial peroneal, sural, saphenous, tibial nerves. 2+ palpable DP and PT pulses.         Status post total left knee replacement    Primary osteoarthritis of left knee    Asthma      PLAN / DISPOSITION:  1 Day Post-Op left total knee arthroplasty    Protected weight bearing as tolerated left lower extremity, knee range of motion as tolerated  Pain control  PT/OT for post op mobilization and medical equipment needs   23 hours perioperative antibiotic prophylaxis   SCD's bilateral lower extremities   Aspirin for DVT prophylaxis   Social work for discharge planning.  Anticipate discharge home today.  Dressing to remain in place for 7 days. May remove on POD#7. If no drainage, may shower on POD#10. No submerging wound in water. If drainage is noted, sterile dressing should be placed and wound checked daily. No showering until wound has remained dry for 72 consecutive hours.    Follow up in 3 weeks for re-assessment.      No future appointments.    Cayetano King MD  09/15/22  06:44 EDT

## 2022-09-16 ENCOUNTER — HOME CARE VISIT (OUTPATIENT)
Dept: HOME HEALTH SERVICES | Facility: HOME HEALTHCARE | Age: 47
End: 2022-09-16

## 2022-09-16 ENCOUNTER — TELEPHONE (OUTPATIENT)
Dept: ORTHOPEDIC SURGERY | Facility: CLINIC | Age: 47
End: 2022-09-16

## 2022-09-16 VITALS
TEMPERATURE: 97.5 F | DIASTOLIC BLOOD PRESSURE: 80 MMHG | SYSTOLIC BLOOD PRESSURE: 120 MMHG | OXYGEN SATURATION: 98 % | RESPIRATION RATE: 17 BRPM | HEART RATE: 71 BPM

## 2022-09-16 PROCEDURE — G0151 HHCP-SERV OF PT,EA 15 MIN: HCPCS

## 2022-09-16 NOTE — TELEPHONE ENCOUNTER
Patient called back, advised her to elevate leg for swelling and she can remove the nerve block-anesthesia cathetar all together. She understood.

## 2022-09-16 NOTE — TELEPHONE ENCOUNTER
"Provider: DR. BRANDT    Caller: MEGHAN KAYE    Relationship to Patient: SELF    Phone Number: [phone]    Reason for Call: PATIENT STATES THAT THE \"WIRE TO THE NERVE BLOCK\" HAS MOVED. SHE STATES SHE STOOD UP TO USE THE RESTROOM AND IT PULLED OUT SLIGHTLY. SHE STATES SHE DOESN'T FEEL LIKE IT'S MAKING HER KNEE AS NUMB AS IT DID BEFORE.    When was the patient last seen: 09/14/22    "

## 2022-09-16 NOTE — PROGRESS NOTES
SHAHRIAR Moreland    Nerve Cath Post Op Call    Patient Name: Sully Yost  :  1975  MRN:  [id number]  Date of Discharge: 9/15/2022    Nerve Cath Post Op Call:    Pain Score:4/10  Patient/Family instructed to call ON CALL anesthesia provider for any questions or problems.  Patient Follow Up:

## 2022-09-19 ENCOUNTER — HOME CARE VISIT (OUTPATIENT)
Dept: HOME HEALTH SERVICES | Facility: HOME HEALTHCARE | Age: 47
End: 2022-09-19

## 2022-09-19 PROCEDURE — G0151 HHCP-SERV OF PT,EA 15 MIN: HCPCS

## 2022-09-20 ENCOUNTER — TELEPHONE (OUTPATIENT)
Dept: ORTHOPEDIC SURGERY | Facility: CLINIC | Age: 47
End: 2022-09-20

## 2022-09-20 VITALS
SYSTOLIC BLOOD PRESSURE: 135 MMHG | HEART RATE: 74 BPM | RESPIRATION RATE: 18 BRPM | OXYGEN SATURATION: 96 % | DIASTOLIC BLOOD PRESSURE: 89 MMHG | TEMPERATURE: 97.8 F

## 2022-09-20 NOTE — HOME HEALTH
Routine Visit Note: Patient seen for PT services this date.  States she was out earlier today with her daughter at an OB gyn appt.     Skill/education provided: Patient education regarding importance of daily ambulation with AD throughout her home, as well as pillow propping to avoid below the knee.     Patient/caregiver response: patient verbalizes agreement and understanding.     Plan for next visit: patient to be seen on Wednesday by PT.

## 2022-09-20 NOTE — TELEPHONE ENCOUNTER
PATIENT CALLED WITH QUESTIONS ABOUT HER TAKING HER BANDAGE OFF AFTER HER KNEE REPLACEMENT SURGERY. SHE ALSO HAS SOME QUESTIONS ABOUT NUMBNESS THAT SHE IS HAVING ALSO.

## 2022-09-20 NOTE — TELEPHONE ENCOUNTER
Ok to be removing dressing- 7 days from surgery.  She states that she has minimal swelling and going to PT.  She is having some numbness in the lower in leg. PT stated it could be from the surgery or block, I told her we can look at it at her appointment.   I told her if she has any more questions to give me a call.    Dede Erwin

## 2022-09-21 ENCOUNTER — TELEPHONE (OUTPATIENT)
Dept: ORTHOPEDIC SURGERY | Facility: CLINIC | Age: 47
End: 2022-09-21

## 2022-09-21 ENCOUNTER — HOME CARE VISIT (OUTPATIENT)
Dept: HOME HEALTH SERVICES | Facility: HOME HEALTHCARE | Age: 47
End: 2022-09-21

## 2022-09-21 PROCEDURE — G0151 HHCP-SERV OF PT,EA 15 MIN: HCPCS

## 2022-09-21 NOTE — TELEPHONE ENCOUNTER
I spoke and she removed her dressing while I was on the phone.  She states that is looks ok.  The prineo is still intact.  I told her to trim the loose edges, but to not pull it off.  She asked about getting it wet and told her in the shower only and to not scrub the area.  I told her no baths, hot tubs or swimming pools at this time.  No further questions were asked.    Dede Erwin

## 2022-09-21 NOTE — TELEPHONE ENCOUNTER
Patient called back about using ice on the knee now that the dressing has been removed.  I told her to put a barrier in between skin and ice.

## 2022-09-23 ENCOUNTER — HOME CARE VISIT (OUTPATIENT)
Dept: HOME HEALTH SERVICES | Facility: HOME HEALTHCARE | Age: 47
End: 2022-09-23

## 2022-09-23 ENCOUNTER — TELEPHONE (OUTPATIENT)
Dept: ORTHOPEDIC SURGERY | Facility: CLINIC | Age: 47
End: 2022-09-23

## 2022-09-23 PROCEDURE — G0151 HHCP-SERV OF PT,EA 15 MIN: HCPCS

## 2022-09-23 NOTE — TELEPHONE ENCOUNTER
"Spoke with pt regarding previous message; She is reporting some numbness below the knee since surgery and is wondering if this is normal; I explained that it can take some time for the nerves to \"wake up\" after surgery and that hopefully this should resolve very soon for her; She verbalized understanding and will reach back out if she needs anything further.    SANDY Carrera  "

## 2022-09-24 VITALS
HEART RATE: 73 BPM | SYSTOLIC BLOOD PRESSURE: 127 MMHG | DIASTOLIC BLOOD PRESSURE: 64 MMHG | OXYGEN SATURATION: 96 % | RESPIRATION RATE: 17 BRPM | TEMPERATURE: 97.5 F

## 2022-09-26 ENCOUNTER — HOME CARE VISIT (OUTPATIENT)
Dept: HOME HEALTH SERVICES | Facility: HOME HEALTHCARE | Age: 47
End: 2022-09-26

## 2022-09-26 ENCOUNTER — TELEPHONE (OUTPATIENT)
Dept: ORTHOPEDIC SURGERY | Facility: CLINIC | Age: 47
End: 2022-09-26

## 2022-09-26 VITALS
SYSTOLIC BLOOD PRESSURE: 134 MMHG | OXYGEN SATURATION: 98 % | HEART RATE: 78 BPM | DIASTOLIC BLOOD PRESSURE: 67 MMHG | TEMPERATURE: 97.6 F | RESPIRATION RATE: 17 BRPM

## 2022-09-26 PROCEDURE — G0151 HHCP-SERV OF PT,EA 15 MIN: HCPCS

## 2022-09-26 NOTE — TELEPHONE ENCOUNTER
I called patient and explained this to her and she understood. She will call with any further questions or concerns.  Ludy Osborne RT (R), ROT

## 2022-09-26 NOTE — TELEPHONE ENCOUNTER
"Provider: DR BRANDT     Caller: MEGHAN KAYE    Relationship to Patient: SELF     Phone Number: [phone] / [phone]    Reason for Call: PATIENT HAD A FEW QUESTIONS- 1. SHE HAS BEEN FOLLOWING THE DIRECTION ABOUT WASHING INCISION AND THE CLEAR \"GLUE\" THAT IS ON INCISION IS STARTING TO COME OFF- WANTED TO MAKE SURE THAT WAS OKAY  2. HER SHIN BELOW KNEE REPLACEMENT IS STILL NUMB, DOES NOT FEEL IT AT ALL WHEN SHE SCRATCHES LEG, ETC.  3. HER KNEE IS STILL SWELLING AND OCCASIONALLY THROBS - WANTED TO MAKE SURE THAT WAS OKAY     POST TKR LEFT KNEE REPLACEMENT 9.14.22     PLEASE CALL AND ADVISE PATIENT. TY  "

## 2022-09-26 NOTE — TELEPHONE ENCOUNTER
Yes the glue will start slowly coming off.  No soaking or having the knee in direct line of the shower water.  Do not scrub the incision gentle cleaning with soap and water and then pat dry.    Will continue with observation of the numbness that she notes to the lower leg.  This is probably related to her spinal and sensation will slowly return.    Swelling and pain that she is having is common at this stage.  Ice, elevation and working with PT will help decrease the swelling which will  In turn help decrease the pain.

## 2022-09-28 ENCOUNTER — HOME CARE VISIT (OUTPATIENT)
Dept: HOME HEALTH SERVICES | Facility: HOME HEALTHCARE | Age: 47
End: 2022-09-28

## 2022-09-28 NOTE — CASE COMMUNICATION
Patient missed a PT visit from [facility] on 9/28/2022.     Reason: patient unavailable.       For your records only.   As per home health protocol, MD must be notified of missed/cancelled visits; therefore the prescribed frequency was not met.

## 2022-09-30 ENCOUNTER — HOME CARE VISIT (OUTPATIENT)
Dept: HOME HEALTH SERVICES | Facility: HOME HEALTHCARE | Age: 47
End: 2022-09-30

## 2022-10-01 NOTE — CASE COMMUNICATION
Patient missed a PT visit from [facility] on 9/30/2022.    Reason: No answer at door or phone.       For your records only.   As per home health protocol, MD must be notified of missed/cancelled visits; therefore the prescribed frequency was not met.

## 2022-10-03 PROCEDURE — G0180 MD CERTIFICATION HHA PATIENT: HCPCS | Performed by: ORTHOPAEDIC SURGERY

## 2022-10-04 ENCOUNTER — HOME CARE VISIT (OUTPATIENT)
Dept: HOME HEALTH SERVICES | Facility: HOME HEALTHCARE | Age: 47
End: 2022-10-04

## 2022-10-04 ENCOUNTER — OFFICE VISIT (OUTPATIENT)
Dept: ORTHOPEDIC SURGERY | Facility: CLINIC | Age: 47
End: 2022-10-04

## 2022-10-04 VITALS — TEMPERATURE: 97.3 F

## 2022-10-04 DIAGNOSIS — Z09 SURGERY FOLLOW-UP: ICD-10-CM

## 2022-10-04 DIAGNOSIS — Z96.652 STATUS POST TOTAL LEFT KNEE REPLACEMENT: Primary | ICD-10-CM

## 2022-10-04 PROCEDURE — 99024 POSTOP FOLLOW-UP VISIT: CPT | Performed by: ORTHOPAEDIC SURGERY

## 2022-10-04 RX ORDER — ACETAMINOPHEN AND CODEINE PHOSPHATE 300; 30 MG/1; MG/1
TABLET ORAL
COMMUNITY
Start: 2022-09-12 | End: 2022-10-04

## 2022-10-04 RX ORDER — NAPROXEN SODIUM 220 MG
220 TABLET ORAL AS NEEDED
COMMUNITY

## 2022-10-04 RX ORDER — MELOXICAM 15 MG/1
TABLET ORAL
Qty: 90 TABLET | Refills: 1 | Status: SHIPPED | OUTPATIENT
Start: 2022-10-04 | End: 2022-10-25

## 2022-10-04 NOTE — PROGRESS NOTES
Orthopaedic Clinic Note:  Knee Post Op    Chief Complaint   Patient presents with   • Post-op     3 weeks status post TOTAL KNEE ARTHROPLASTY LEFT 22        HPI    Ms. Yost is 3  week(s) s/p left total knee arthroplasty. Rates pain 3/10. She is ambulating with a crutch and is taking Motrin/Ibuprofen/Meloxicam/Naproxen/Diclofenac for pain control. She denies fevers, chills, or constitutional symptoms.  She is continuing home health PT. Patient is improving overall.  She denies complications..    I have reviewed the following portions of the patient's history:History of Present Illness    Past Medical History:   Diagnosis Date   • Anemia    • Anesthesia complication     per pt, she stopped breathing on table during cyst removal and required resucitation   • Anxiety    • Arthritis    • Asthma    • Depression    • GERD (gastroesophageal reflux disease)    • Heart murmur    • Migraine    • Sleep apnea     self-diagnosed, has not had study to confirm      Past Surgical History:   Procedure Laterality Date   • CYST REMOVAL     • TOTAL KNEE ARTHROPLASTY Left 2022    Procedure: TOTAL KNEE ARTHROPLASTY LEFT;  Surgeon: Cayetano King MD;  Location: [facility];  Service: Orthopedics;  Laterality: Left;   • VAGINAL DELIVERY      x5     Family History   Problem Relation Age of Onset   • Cancer Mother    • Diabetes Mother    • No Known Problems Father       Social History     Socioeconomic History   • Marital status:    Tobacco Use   • Smoking status: Former Smoker     Packs/day: 0.50     Types: Cigarettes     Quit date: 2022     Years since quittin.3   • Smokeless tobacco: Never Used   Vaping Use   • Vaping Use: Never used   Substance and Sexual Activity   • Alcohol use: Yes     Comment: OCCASIONALLY   • Drug use: No   • Sexual activity: Defer      Current Outpatient Medications on File Prior to Visit   Medication Sig Dispense Refill   • aspirin 81 MG EC tablet Take 1 tablet by mouth 2 (Two)  "Times a Day. 60 tablet 0   • naproxen sodium (ALEVE) 220 MG tablet Take 220 mg by mouth As Needed.     • albuterol sulfate  (90 Base) MCG/ACT inhaler Inhale 2 puffs Every 6 (Six) Hours As Needed for Shortness of Air or Wheezing.     • ropivacaine (NAROPIN) 0.2 % infusion (INFUSYSTEM) 2 mg/hr by Peripheral Nerve route Continuous.     • [DISCONTINUED] acetaminophen-codeine (TYLENOL #3) 300-30 MG per tablet      • [DISCONTINUED] oxyCODONE (Roxicodone) 5 MG immediate release tablet Take 1 tablet by mouth Every 4 (Four) Hours As Needed for Moderate Pain. 40 tablet 0     No current facility-administered medications on file prior to visit.      Allergies   Allergen Reactions   • Penicillins Other (See Comments)     \"MY WHOLE FAMILY IS ALLERGIC AND I DONT WANT TO TAKE A CHANCE\"         Review of Systems   Constitutional: Negative.    HENT: Negative.    Eyes: Negative.    Respiratory: Negative.    Cardiovascular: Negative.    Gastrointestinal: Negative.    Endocrine: Negative.    Genitourinary: Negative.    Musculoskeletal: Positive for arthralgias and joint swelling.   Skin: Negative.    Allergic/Immunologic: Negative.    Neurological: Negative.    Hematological: Negative.    Psychiatric/Behavioral: Negative.         Physical Exam  Temperature 97.3 °F (36.3 °C).    There is no height or weight on file to calculate BMI.    GENERAL APPEARANCE: awake, alert, oriented, in no acute distress and well developed, well nourished  LUNGS:  breathing nonlabored  EXTREMITIES: no clubbing, cyanosis  PERIPHERAL PULSES: palpable dorsalis pedis and posterior tibial pulses bilaterally.    GAIT:  Normal          Left Knee Exam:  ----------  ALIGNMENT: neutral  ----------  RANGE OF MOTION:  Decreased (0 - 95 degrees) with no extensor lag  LIGAMENTOUS STABILITY:   stable to varus and valgus stress at terminal extension and 30 degrees without any evidence of laxity  ----------  STRENGTH:  KNEE FLEXION 5/5  KNEE EXTENSION  5/5  ANKLE " DORSIFLEXION  5/5  ANKLE PLANTARFLEXION  5/5  ----------  PAIN WITH PALPATION:denies tenderness to palpation about the knee  KNEE EFFUSION: yes, mild effusion  PAIN WITH KNEE ROM: no  PATELLAR CREPITUS:  no  ----------  SENSATION TO LIGHT TOUCH:  DEEP PERONEAL/SUPERFICIAL PERONEAL/SURAL/SAPHENOUS/TIBIAL:    intact  ----------  EDEMA:  no  ERYTHEMA:    no  WOUNDS/INCISIONS:   yes, well healed surgical incision without evidence of erythema or drainage  _____________________________________________________________________  _____________________________________________________________________    RADIOGRAPHIC FINDINGS:   Indication: Status post left total knee arthroplasty    Comparison: Todays xrays were compared to previous xrays from 9/14/2022    Knee films: Demonstrate well positioned knee arthroplasty components in satisfactory alignment without evidence of wear, loosening, subsidence, fracture, or osteolysis and No significant changes compared to prior radiographs.       Assessment/Plan:   Diagnosis Plan   1. Status post total left knee replacement  meloxicam (MOBIC) 15 MG tablet    Ambulatory Referral to Physical Therapy Evaluate and treat, Ortho, POST OP   2. Surgery follow-up  XR Knee 3+ View With [place] Left     Patient is doing well 3-week status post left total knee arthroplasty.  I encouraged patient to continue working on gait training and strengthening.  She especially needs to work on range of motion exercises.  We will transition her to outpatient physical therapy with referral placed for [facility].  I will prescribe her additional meloxicam.  She will follow-up in 3 weeks for repeat assessment.    Cayetano King MD  10/04/22  10:14 EDT

## 2022-10-06 ENCOUNTER — HOME CARE VISIT (OUTPATIENT)
Dept: HOME HEALTH SERVICES | Facility: HOME HEALTHCARE | Age: 47
End: 2022-10-06

## 2022-10-07 ENCOUNTER — TELEPHONE (OUTPATIENT)
Dept: ORTHOPEDIC SURGERY | Facility: CLINIC | Age: 47
End: 2022-10-07

## 2022-10-07 NOTE — TELEPHONE ENCOUNTER
I advised the patient to not smoke at all.  I told her she can discuss this with Dr. King at her next appt.  10/25/22.  Patient understood and had no further questions.      Dede Erwin

## 2022-10-07 NOTE — CASE COMMUNICATION
Patient missed a PT visit from [facility] on 10/4/2022.     Reason: No answer at door or phone.       For your records only.   As per home health protocol, MD must be notified of missed/cancelled visits; therefore the prescribed frequency was not met.

## 2022-10-07 NOTE — TELEPHONE ENCOUNTER
Patient was calling to be advised about whether or not she can possibly smoke a cigarette every other day or so.  She had quit at the advice of the provider prior to surgery because it slowed down the healing process but she had been experiencing headaches from withdrawal and tylenol doesn't seem to help.  She stated that it didn't have to be an everyday thing but wanted to check with him as to not do any harm.

## 2022-10-08 NOTE — CASE COMMUNICATION
Patient discharged from home care services as patient unreachable and would benefit from continued PT services via outpatient PT as able.

## 2022-10-12 ENCOUNTER — HOSPITAL ENCOUNTER (OUTPATIENT)
Dept: PHYSICAL THERAPY | Facility: HOSPITAL | Age: 47
Setting detail: THERAPIES SERIES
Discharge: HOME | End: 2022-10-12

## 2022-10-12 DIAGNOSIS — Z96.652 STATUS POST TOTAL LEFT KNEE REPLACEMENT: Primary | ICD-10-CM

## 2022-10-12 PROCEDURE — 97161 PT EVAL LOW COMPLEX 20 MIN: CPT

## 2022-10-12 NOTE — THERAPY EVALUATION
Outpatient Physical Therapy Ortho Initial Evaluation  [facility]     Patient Name: Sully Yost  : 1975  MRN: [id number]  Today's Date: 10/12/2022      Visit Date: 10/12/2022    Patient Active Problem List   Diagnosis   • Primary osteoarthritis of left knee   • Status post total left knee replacement   • Asthma   • Acute postoperative pain   • Acute blood loss anemia, asymptomatic        Past Medical History:   Diagnosis Date   • Anemia    • Anesthesia complication     per pt, she stopped breathing on table during cyst removal and required resucitation   • Anxiety    • Arthritis    • Asthma    • Depression    • GERD (gastroesophageal reflux disease)    • Heart murmur    • Migraine    • Sleep apnea     self-diagnosed, has not had study to confirm        Past Surgical History:   Procedure Laterality Date   • CYST REMOVAL     • TOTAL KNEE ARTHROPLASTY Left 2022    Procedure: TOTAL KNEE ARTHROPLASTY LEFT;  Surgeon: Cayetano King MD;  Location: [facility];  Service: Orthopedics;  Laterality: Left;   • VAGINAL DELIVERY      x5       Visit Dx:     ICD-10-CM ICD-9-CM   1. Status post total left knee replacement  Z96.652 V43.65          Patient History     Row Name 10/12/22 1430             History    Chief Complaint Difficulty Walking;Difficulty with daily activities;Pain;Muscle weakness  -JH      Type of Pain Knee pain  -JH      Brief Description of Current Complaint Patient reports having a fall about 5 years ago and her knee had been bothering her since. She had a left TKA on 2022 by Dr. King. She hopes to return to weeks at 8 weeks post surgery. She is a teacher at a .  -      Patient/Caregiver Goals Relieve pain;Return to prior level of function;Return to work;Improve mobility;Improve strength;Know what to do to help the symptoms  -         Pain     Pain Location Knee  -      Pain at Present 5  -JH      Pain at Best 3  -JH      Pain at Worst 10  -JH      Pain  Frequency Several days a week  -      What Performance Factors Make the Current Problem(s) WORSE? stairs, walking, squatting  -      What Performance Factors Make the Current Problem(s) BETTER? pain reliever medication, gentle movement  -      Difficulties at work? n/a  -      Difficulties with ADL's? yes  -      Difficulties with recreational activities? yes  -         Fall Risk Assessment    Any falls in the past year: No  -         Daily Activities    Primary Language English  -      Pt Participated in POC and Goals Yes  -            User Key  (r) = Recorded By, (t) = Taken By, (c) = Cosigned By    Initials Name Provider Type    Suman Perez, JC Physical Therapist                 PT Ortho     Row Name 10/12/22 1430       Posture/Observations    Posture/Observations Comments Patient ambulating with antalgic gait pattern and using single axillary crutch. She is able to ambulate short distance with no AD with short step patterns and slow gait speeds.  -       General ROM    LT Lower Ext Lt Knee Extension/Flexion  -       Left Lower Ext    Lt Knee Extension/Flexion AROM 2-102  -    Lt Knee Extension/Flexion PROM 0-105  -       MMT (Manual Muscle Testing)    General MMT Comments Able to perform quad set, deferred MMT, mild extensor lag SLR. Patient able to perform 8 minutes on Nustep on level 0 resistance, heel slides, quad sets, and knee extension passively with cues on education and performance for HEP.  -       Sensation    Sensation WNL? WFL  -    Light Touch Partial deficits in the LLE  -    Additional Comments Patient does have some numbness at left anterior knee to mid shin.  -          User Key  (r) = Recorded By, (t) = Taken By, (c) = Cosigned By    Initials Name Provider Type    Suman Perez, JC Physical Therapist                            Therapy Education  Education Details: HEP included: heel slides seated and supine, prone hand, knee extension hang, quad  sets  Given: HEP, Symptoms/condition management, Pain management, Posture/body mechanics, Fall prevention and home safety, Mobility training  Program: New  How Provided: Verbal, Demonstration, Written  Provided to: Patient  Level of Understanding: Teach back education performed, Verbalized, Demonstrated      PT OP Goals     Row Name 10/12/22 1430          PT Short Term Goals    STG Date to Achieve 11/02/22  -     STG 1 Compliant with HEP  -     STG 1 Progress New  -     STG 2 Symptoms improved to 50% or more  -     STG 2 Progress New  -     STG 3 PROM 0-125 left knee for improved function  -     STG 3 Progress New  [facility]        Long Term Goals    LTG Date to Achieve 11/23/22  -     LTG 1 AROM 0-125 left knee for improved function with daily activities  -     LTG 1 Progress New  -     LTG 2 Patient will be able to navigate a flight of stairs ind with no safety issues  -     LTG 2 Progress New  -     LTG 3 Pt will be able to ambulate with no significant gait deviations and no AD required  -     LTG 3 Progress New  [facility]     LTG 4 LEFS to 70 or more to indicate improved function  -[facility] 4 Progress [facility]        Time Calculation    PT Goal Re-Cert Due Date 01/10/23  -           User Key  (r) = Recorded By, (t) = Taken By, (c) = Cosigned By    Initials Name Provider Type     Suman Franco, PT Physical Therapist                 PT Assessment/Plan     Row Name 10/12/22 1430          PT Assessment    Functional Limitations Decreased safety during functional activities;Impaired gait;Impaired locomotion;Limitation in home management;Performance in self-care ADL;Performance in leisure activities;Limitations in functional capacity and performance;Performance in work activities  -     Impairments Balance;Endurance;Gait;Sensation;Range of motion;Pain;Muscle strength;Joint mobility;Joint integrity  -     Assessment Comments Patient is a 47 year old female that presents with evolving symptoms of  low complexity. Patient presents s/p Left TKA on 9/14/22 with deficits limiting function. Skilled physical therapy services warranted to address improvement upon listed deficits, meet patient goals, and maximize function.  -     Please refer to paper survey for additional self-reported information Yes  -     Rehab Potential Good  -     Patient/caregiver participated in establishment of treatment plan and goals Yes  -     Patient would benefit from skilled therapy intervention Yes  -        PT Plan    PT Frequency 2x/week  -     Predicted Duration of Therapy Intervention (PT) 20 visits  -     Planned CPT's? PT EVAL LOW COMPLEXITY: 15574;PT THER PROC EA 15 MIN: 67004;PT MANUAL THERAPY EA 15 MIN: 62488;PT THER ACT EA 15 MIN: 61386;PT GAIT TRAINING EA 15 MIN: 37918;PT NEUROMUSC RE-EDUCATION EA 15 MIN: 48068  -     PT Plan Comments Plan to address patient’s impairments and limitations with skilled PT interventions focusing on improving mobility for improved ability to perform daily activities.  -           User Key  (r) = Recorded By, (t) = Taken By, (c) = Cosigned By    Initials Name Provider Type     Suman Franco, PT Physical Therapist                                    Outcome Measure Options: Lower Extremity Functional Scale (LEFS)  Lower Extremity Functional Index  Any of your usual work, housework or school activities: A little bit of difficulty  Your usual hobbies, recreational or sporting activities: A little bit of difficulty  Getting into or out of the bath: A little bit of difficulty  Walking between rooms: A little bit of difficulty  Putting on your shoes or socks: A little bit of difficulty  Squatting: A little bit of difficulty  Lifting an object, like a bag of groceries from the floor: No difficulty  Performing light activities around your home: No difficulty  Performing heavy activities around your home: A little bit of difficulty  Getting into or out of a car: A little bit of  difficulty  Walking 2 blocks: A little bit of difficulty  Walking a mile: A little bit of difficulty  Going up or down 10 stairs (about 1 flight of stairs): No difficulty  Standing for 1 hour: A little bit of difficulty  Sitting for 1 hour: No difficulty  Running on even ground: Extreme difficulty or unable to perform activity  Running on uneven ground: Extreme difficulty or unable to perform activity  Making sharp turns while running fast: Extreme difficulty or unable to perform activity  Hopping: Extreme difficulty or unable to perform activity  Rolling over in bed: Moderate difficulty  Total: 51      Time Calculation:     Start Time: 1430  Untimed Charges  PT Eval/Re-eval Minutes: 60  Total Minutes  Untimed Charges Total Minutes: 60   Total Minutes: 60     Therapy Charges for Today     Code Description Service Date Service Provider Modifiers Qty    60029923666 HC PT EVAL LOW COMPLEXITY 4 10/12/2022 Suman Franco, PT GP 1          PT G-Tsering  Outcome Measure Options: Lower Extremity Functional Scale (LEFS)  Total: 51         Suman Franco PT  10/12/2022

## 2022-10-14 NOTE — TELEPHONE ENCOUNTER
I left a message for the patient advising that she not smoke at all, even just a couple of puffs.  I told her she can discuss further with Dr. King at her next appt.      Dede Erwin

## 2022-10-14 NOTE — TELEPHONE ENCOUNTER
"This patient is calling back. She is wanting know if she can take \"a couple puffs, not even a whole one\" of a cigarette.   "

## 2022-10-17 ENCOUNTER — TELEPHONE (OUTPATIENT)
Dept: ORTHOPEDIC SURGERY | Facility: CLINIC | Age: 47
End: 2022-10-17

## 2022-10-17 ENCOUNTER — HOSPITAL ENCOUNTER (OUTPATIENT)
Dept: PHYSICAL THERAPY | Facility: HOSPITAL | Age: 47
Setting detail: THERAPIES SERIES
Discharge: HOME | End: 2022-10-17

## 2022-10-17 DIAGNOSIS — Z96.652 STATUS POST TOTAL LEFT KNEE REPLACEMENT: Primary | ICD-10-CM

## 2022-10-17 PROCEDURE — 97110 THERAPEUTIC EXERCISES: CPT

## 2022-10-17 PROCEDURE — 97140 MANUAL THERAPY 1/> REGIONS: CPT

## 2022-10-17 NOTE — TELEPHONE ENCOUNTER
Left VM to find out what patient's Disability/Incapacity Statement is for. Just ask for Narcisa HOWELL in clinic when she calls back.

## 2022-10-17 NOTE — THERAPY TREATMENT NOTE
Outpatient Physical Therapy Ortho Treatment Note  [facility]     Patient Name: Sully Yost  : 1975  MRN: [id number]  Today's Date: 10/17/2022      Visit Date: 10/17/2022    Visit Dx:    ICD-10-CM ICD-9-CM   1. Status post total left knee replacement  Z96.652 V43.65       Patient Active Problem List   Diagnosis   • Primary osteoarthritis of left knee   • Status post total left knee replacement   • Asthma   • Acute postoperative pain   • Acute blood loss anemia, asymptomatic        Past Medical History:   Diagnosis Date   • Anemia    • Anesthesia complication     per pt, she stopped breathing on table during cyst removal and required resucitation   • Anxiety    • Arthritis    • Asthma    • Depression    • GERD (gastroesophageal reflux disease)    • Heart murmur    • Migraine    • Sleep apnea     self-diagnosed, has not had study to confirm        Past Surgical History:   Procedure Laterality Date   • CYST REMOVAL     • TOTAL KNEE ARTHROPLASTY Left 2022    Procedure: TOTAL KNEE ARTHROPLASTY LEFT;  Surgeon: Cayetano King MD;  Location: [facility];  Service: Orthopedics;  Laterality: Left;   • VAGINAL DELIVERY      x5                        PT Assessment/Plan     Row Name 10/17/22 1020          PT Assessment    Functional Limitations Decreased safety during functional activities;Impaired gait;Impaired locomotion;Limitation in home management;Performance in self-care ADL;Performance in leisure activities;Limitations in functional capacity and performance;Performance in work activities  -     Impairments Balance;Endurance;Gait;Sensation;Range of motion;Pain;Muscle strength;Joint mobility;Joint integrity  -     Assessment Comments Patient having some soreness following HEP. Recommend her to change 15 reps to 10 reps to avoid overdoing it for time being. Continues to have anterior/lateral thigh numbness. Extension ROM doing well and progressing with flexion end range. She had less  stiffnes at end of session and ambulating with improved heel toe pattern at end of session.  -        PT Plan    PT Plan Comments Cont per POC  -           User Key  (r) = Recorded By, (t) = Taken By, (c) = Cosigned By    Initials Name Provider Type    Suman Perez, PT Physical Therapist                   OP Exercises     Row Name 10/17/22 1020             Subjective Comments    Subjective Comments Patient reports continued numbness along anterior lateral thigh. She was sore after HEP this weekend.  -         Subjective Pain    Able to rate subjective pain? yes  -      Pre-Treatment Pain Level 5  -      Post-Treatment Pain Level 4  -         Total Minutes    59090 - Gait Training Minutes  5  -      95919 - PT Therapeutic Exercise Minutes 24  -      55314 - PT Manual Therapy Minutes 14  -         Exercise 1    Exercise Name 1 NuStep  -      Time 1 5 min  -         Exercise 2    Exercise Name 2 Recumbent bike  -      Time 2 5 min  -         Exercise 3    Exercise Name 3 Quad sets  -      Sets 3 2  -      Reps 3 10  -         Exercise 4    Exercise Name 4 LAQ  -      Reps 4 10  -         Exercise 5    Exercise Name 5 Isometric leg press into ball  -      Reps 5 10  -         Exercise 6    Exercise Name 6 Seated heel slides  -      Reps 6 10  -         Exercise 7    Exercise Name 7 Single leg hip abduction on foam  -      Reps 7 10  -         Exercise 8    Exercise Name 8 Calf stretch standing  -         Exercise 9    Exercise Name 9 Gait  -      Cueing 9 Verbal;Demo  -      Time 9 5 min  -      Additional Comments Gait training cues for heel toe, avoiding cicumeduction, upright posture, and working on step through pattern.  -            User Key  (r) = Recorded By, (t) = Taken By, (c) = Cosigned By    Initials Name Provider Type    Suman Perez, PT Physical Therapist                         Manual Rx (last 36 hours)     Manual Treatments     Row Name  10/17/22 1020             Total Minutes    41322 - PT Manual Therapy Minutes 14  -         Manual Rx 1    Manual Rx 1 Location Left knee  -      Manual Rx 1 Type PROM flexion and extension seated and supine. Manual hamstring and calf stretch. Patellar mobs all planes  -      Manual Rx 1 Duration 14 min  -            User Key  (r) = Recorded By, (t) = Taken By, (c) = Cosigned By    Initials Name Provider Type    Suman Perez PT Physical Therapist                                   Time Calculation:   Start Time: 1020  Timed Charges  23307 - PT Therapeutic Exercise Minutes: 24  69298 - Gait Training Minutes : 5  03832 - PT Manual Therapy Minutes: 14  Total Minutes  Timed Charges Total Minutes: 43   Total Minutes: 43  Therapy Charges for Today     Code Description Service Date Service Provider Modifiers Qty    54820057952  PT THER PROC EA 15 MIN 10/17/2022 Suman Franco, PT GP 2    [id number]  PT MANUAL THERAPY EA 15 MIN 10/17/2022 Suman Franco, PT GP 1                    Suman Franco PT  10/17/2022

## 2022-10-18 NOTE — TELEPHONE ENCOUNTER
Patient called back and said that disability would be temporary. She is planning on returning to work once cleared. She is hoping for the papers to be finished by tomorrow so she can pick them up.

## 2022-10-18 NOTE — TELEPHONE ENCOUNTER
Spoke with patient, she will  form tomorrow with RTW date of 11/7/22.    Disability Statement Form completed and signed, copy placed at FD for scanning, Orginal placed in patient accordian file at  for  /amfd 10/18/22

## 2022-10-19 ENCOUNTER — HOSPITAL ENCOUNTER (OUTPATIENT)
Dept: PHYSICAL THERAPY | Facility: HOSPITAL | Age: 47
Setting detail: THERAPIES SERIES
Discharge: HOME | End: 2022-10-19

## 2022-10-19 DIAGNOSIS — Z96.652 STATUS POST TOTAL LEFT KNEE REPLACEMENT: Primary | ICD-10-CM

## 2022-10-19 PROCEDURE — 97110 THERAPEUTIC EXERCISES: CPT | Performed by: GENERAL ACUTE CARE HOSPITAL

## 2022-10-19 PROCEDURE — 97116 GAIT TRAINING THERAPY: CPT | Performed by: GENERAL ACUTE CARE HOSPITAL

## 2022-10-19 NOTE — THERAPY TREATMENT NOTE
Outpatient Physical Therapy Ortho Treatment Note  [facility]     Patient Name: Sully Yost  : 1975  MRN: [id number]  Today's Date: 10/19/2022      Visit Date: 10/19/2022    Visit Dx:    ICD-10-CM ICD-9-CM   1. Status post total left knee replacement  Z96.652 V43.65       Patient Active Problem List   Diagnosis   • Primary osteoarthritis of left knee   • Status post total left knee replacement   • Asthma   • Acute postoperative pain   • Acute blood loss anemia, asymptomatic        Past Medical History:   Diagnosis Date   • Anemia    • Anesthesia complication     per pt, she stopped breathing on table during cyst removal and required resucitation   • Anxiety    • Arthritis    • Asthma    • Depression    • GERD (gastroesophageal reflux disease)    • Heart murmur    • Migraine    • Sleep apnea     self-diagnosed, has not had study to confirm        Past Surgical History:   Procedure Laterality Date   • CYST REMOVAL     • TOTAL KNEE ARTHROPLASTY Left 2022    Procedure: TOTAL KNEE ARTHROPLASTY LEFT;  Surgeon: Cayetano King MD;  Location: [facility];  Service: Orthopedics;  Laterality: Left;   • VAGINAL DELIVERY      x5                        PT Assessment/Plan     Row Name 10/19/22 2368          PT Assessment    Assessment Comments Pt doing well and shows improvement in his AROM of the knee compared to her initial evaluation. She also demonstrates overall improved strength of the L LE at this time.  -HP        PT Plan    PT Plan Comments Continue per POC  -           User Key  (r) = Recorded By, (t) = Taken By, (c) = Cosigned By    Initials Name Provider Type    Alfonso Hodges, PT Physical Therapist                   OP Exercises     Row Name 10/19/22 4564             Subjective Comments    Subjective Comments Pt states that she is having increased stiffness in the knee today  -         Subjective Pain    Able to rate subjective pain? yes  -      Pre-Treatment Pain Level 6  -HP  "     Post-Treatment Pain Level 4  -HP         Total Minutes    55672 - Gait Training Minutes  8  -HP      30243 - PT Therapeutic Exercise Minutes 32  -HP         Exercise 1    Exercise Name 1 NuStep  -HP      Time 1 5 min  -HP         Exercise 2    Exercise Name 2 Recumbent bike  -HP      Time 2 5 min  -HP         Exercise 3    Exercise Name 3 Quad sets  -HP      Sets 3 2  -HP      Reps 3 10  -HP      Additional Comments at knee and ankle  -HP         Exercise 4    Exercise Name 4 LAQ  -HP      Sets 4 2  -HP      Reps 4 10  -HP         Exercise 5    Exercise Name 5 STS with 4# ball  -HP      Reps 5 10  -HP      Additional Comments from chair  -HP         Exercise 6    Exercise Name 6 Supine heel slides with green strap  -HP      Sets 6 2  -HP      Reps 6 10  -HP         Exercise 7    Exercise Name 7 Fwd lunges onto bosu ball  -HP      Reps 7 10  -HP         Exercise 8    Exercise Name 8 Fwd and side step up on 4\" step  -HP      Sets 8 2  -HP      Reps 8 10  -HP         Exercise 9    Exercise Name 9 Gait  -HP      Cueing 9 Verbal;Demo  -HP      Time 9 8 min  -HP      Additional Comments fwd, side, bwd  -HP         Exercise 10    Exercise Name 10 Fwd and side taps on to wooden box  -HP      Reps 10 10 each  -HP            User Key  (r) = Recorded By, (t) = Taken By, (c) = Cosigned By    Initials Name Provider Type     Alfonso Hand, PT Physical Therapist                                                Time Calculation:   Start Time: 0930  Timed Charges  30799 - PT Therapeutic Exercise Minutes: 32  91196 - Gait Training Minutes : 8  Total Minutes  Timed Charges Total Minutes: 40   Total Minutes: 40  Therapy Charges for Today     Code Description Service Date Service Provider Modifiers Qty    96548643851 HC PT THER PROC EA 15 MIN 10/19/2022 Alfonso Hand, PT GP 2    97727926273 HC GAIT TRAINING EA 15 MIN 10/19/2022 Alfonso Hand, PT GP 1                    Alfonso Hand PT  10/19/2022     "

## 2022-10-24 ENCOUNTER — HOSPITAL ENCOUNTER (OUTPATIENT)
Dept: PHYSICAL THERAPY | Facility: HOSPITAL | Age: 47
Setting detail: THERAPIES SERIES
Discharge: HOME | End: 2022-10-24

## 2022-10-24 DIAGNOSIS — Z96.652 STATUS POST TOTAL LEFT KNEE REPLACEMENT: Primary | ICD-10-CM

## 2022-10-24 PROCEDURE — 97116 GAIT TRAINING THERAPY: CPT | Performed by: GENERAL ACUTE CARE HOSPITAL

## 2022-10-24 PROCEDURE — 97110 THERAPEUTIC EXERCISES: CPT | Performed by: GENERAL ACUTE CARE HOSPITAL

## 2022-10-24 NOTE — THERAPY TREATMENT NOTE
Outpatient Physical Therapy Ortho Treatment Note  [facility]     Patient Name: Sully Yost  : 1975  MRN: [id number]  Today's Date: 10/24/2022      Visit Date: 10/24/2022    Visit Dx:    ICD-10-CM ICD-9-CM   1. Status post total left knee replacement  Z96.652 V43.65       Patient Active Problem List   Diagnosis   • Primary osteoarthritis of left knee   • Status post total left knee replacement   • Asthma   • Acute postoperative pain   • Acute blood loss anemia, asymptomatic        Past Medical History:   Diagnosis Date   • Anemia    • Anesthesia complication     per pt, she stopped breathing on table during cyst removal and required resucitation   • Anxiety    • Arthritis    • Asthma    • Depression    • GERD (gastroesophageal reflux disease)    • Heart murmur    • Migraine    • Sleep apnea     self-diagnosed, has not had study to confirm        Past Surgical History:   Procedure Laterality Date   • CYST REMOVAL     • TOTAL KNEE ARTHROPLASTY Left 2022    Procedure: TOTAL KNEE ARTHROPLASTY LEFT;  Surgeon: Cayetano King MD;  Location: [facility];  Service: Orthopedics;  Laterality: Left;   • VAGINAL DELIVERY      x5                        PT Assessment/Plan     Row Name 10/24/22 02          PT Assessment    Assessment Comments Pt continues to progress well with therapy and shows overall improvement in her tolerance to exercise and movement. She is limited in her knee flexion AROM the greatest.  -HP        PT Plan    PT Plan Comments Continue per POC  -HP           User Key  (r) = Recorded By, (t) = Taken By, (c) = Cosigned By    Initials Name Provider Type    Alfonso Hodges, PT Physical Therapist                   OP Exercises     Row Name 10/24/22 3287             Subjective Comments    Subjective Comments Pt states that she is doing well but just having some increased soreness  -         Subjective Pain    Able to rate subjective pain? yes  -HP      Pre-Treatment Pain Level  4  -HP      Post-Treatment Pain Level 4  -HP         Total Minutes    36159 - Gait Training Minutes  10  -HP      67258 - PT Therapeutic Exercise Minutes 30  -HP         Exercise 1    Exercise Name 1 Recumbent bike  -HP      Time 1 8 min  -HP      Additional Comments L5  -HP         Exercise 2    Exercise Name 2 Treadmill  -HP      Time 2 2 min bwd; 6 min fwd  -HP      Additional Comments Gradual incrase to 2.5mph  -HP         Exercise 3    Exercise Name 3 STS from varying table heights  -HP      Sets 3 3  -HP      Reps 3 10  -HP         Exercise 4    Exercise Name 4 STS with SL from increased table height  -HP      Sets 4 3  -HP      Reps 4 10  -HP         Exercise 5    Exercise Name 5 Prone quad stretch  -HP      Reps 5 3  -HP      Time 5 30 sec hold each  -HP         Exercise 6    Exercise Name 6 5# ankle weight: LAQ, standing knee curls, standing 3 way hip, marches  -HP      Sets 6 2  -HP      Reps 6 10 each  -HP            User Key  (r) = Recorded By, (t) = Taken By, (c) = Cosigned By    Initials Name Provider Type    HP Alfonso Hand, PT Physical Therapist                                                Time Calculation:   Start Time: 0930  Timed Charges  87846 - PT Therapeutic Exercise Minutes: 30  02899 - Gait Training Minutes : 10  Total Minutes  Timed Charges Total Minutes: 40   Total Minutes: 40  Therapy Charges for Today     Code Description Service Date Service Provider Modifiers Qty    01511379008  PT THER PROC EA 15 MIN 10/24/2022 Alfonso Hand, PT GP 2    [id number]  GAIT TRAINING EA 15 MIN 10/24/2022 Alfonso Hand, PT GP 1                    Alfonso Hand PT  10/24/2022

## 2022-10-25 ENCOUNTER — OFFICE VISIT (OUTPATIENT)
Dept: ORTHOPEDIC SURGERY | Facility: CLINIC | Age: 47
End: 2022-10-25

## 2022-10-25 DIAGNOSIS — Z96.652 STATUS POST TOTAL LEFT KNEE REPLACEMENT: Primary | ICD-10-CM

## 2022-10-25 PROCEDURE — 99024 POSTOP FOLLOW-UP VISIT: CPT | Performed by: ORTHOPAEDIC SURGERY

## 2022-10-25 NOTE — PROGRESS NOTES
Orthopaedic Clinic Note:  Knee Post Op    Chief Complaint   Patient presents with   • Post-op     3 week follow up -- 6 week status post TOTAL KNEE ARTHROPLASTY LEFT 22        HPI    Ms. Yost is 6  week(s) s/p left total knee arthroplasty.  Rates her pain a 2/10 on pain scale.  She is ambulating with no assistive device.  She is taking Aleve for pain control.  Denies fevers chills or constitutional symptoms.  Overall she is happy with her progress.    Past Medical History:   Diagnosis Date   • Anemia    • Anesthesia complication     per pt, she stopped breathing on table during cyst removal and required resucitation   • Anxiety    • Arthritis    • Asthma    • Depression    • GERD (gastroesophageal reflux disease)    • Heart murmur    • Migraine    • Sleep apnea     self-diagnosed, has not had study to confirm      Past Surgical History:   Procedure Laterality Date   • CYST REMOVAL     • TOTAL KNEE ARTHROPLASTY Left 2022    Procedure: TOTAL KNEE ARTHROPLASTY LEFT;  Surgeon: Cayetano King MD;  Location: [facility];  Service: Orthopedics;  Laterality: Left;   • VAGINAL DELIVERY      x5     Family History   Problem Relation Age of Onset   • Cancer Mother    • Diabetes Mother    • No Known Problems Father       Social History     Socioeconomic History   • Marital status:    Tobacco Use   • Smoking status: Former     Packs/day: 0.50     Types: Cigarettes     Quit date: 2022     Years since quittin.4   • Smokeless tobacco: Never   Vaping Use   • Vaping Use: Never used   Substance and Sexual Activity   • Alcohol use: Yes     Comment: OCCASIONALLY   • Drug use: No   • Sexual activity: Defer      Current Outpatient Medications on File Prior to Visit   Medication Sig Dispense Refill   • albuterol sulfate  (90 Base) MCG/ACT inhaler Inhale 2 puffs Every 6 (Six) Hours As Needed for Shortness of Air or Wheezing.     • aspirin 81 MG EC tablet Take 1 tablet by mouth 2 (Two) Times a Day.  "60 tablet 0   • naproxen sodium (ALEVE) 220 MG tablet Take 220 mg by mouth As Needed.     • ropivacaine (NAROPIN) 0.2 % infusion (INFUSYSTEM) 2 mg/hr by Peripheral Nerve route Continuous.     • [DISCONTINUED] meloxicam (MOBIC) 15 MG tablet 1 PO Daily with food. 90 tablet 1     No current facility-administered medications on file prior to visit.      Allergies   Allergen Reactions   • Penicillins Other (See Comments)     \"MY WHOLE FAMILY IS ALLERGIC AND I DONT WANT TO TAKE A CHANCE\"         Review of Systems   Constitutional: Negative.    HENT: Negative.    Eyes: Negative.    Respiratory: Negative.    Cardiovascular: Negative.    Gastrointestinal: Negative.    Endocrine: Negative.    Genitourinary: Negative.    Musculoskeletal: Positive for arthralgias.   Skin: Negative.    Allergic/Immunologic: Negative.    Neurological: Negative.    Hematological: Negative.    Psychiatric/Behavioral: Negative.         Physical Exam  There were no vitals taken for this visit.    There is no height or weight on file to calculate BMI.    GENERAL APPEARANCE: awake, alert, oriented, in no acute distress and well developed, well nourished  LUNGS:  breathing nonlabored  EXTREMITIES: no clubbing, cyanosis  PERIPHERAL PULSES: palpable dorsalis pedis and posterior tibial pulses bilaterally.    GAIT:  Normal          Left Knee Exam:  ----------  ALIGNMENT: neutral  ----------  RANGE OF MOTION:  Decreased (0 -115 degrees) with no extensor lag  LIGAMENTOUS STABILITY:   stable to varus and valgus stress at terminal extension and 30 degrees without any evidence of laxity  ----------  STRENGTH:  KNEE FLEXION 5/5  KNEE EXTENSION  5/5  ANKLE DORSIFLEXION  5/5  ANKLE PLANTARFLEXION  5/5  ----------  PAIN WITH PALPATION:denies tenderness to palpation about the knee  KNEE EFFUSION: yes, mild effusion  PAIN WITH KNEE ROM: no  PATELLAR CREPITUS:  no  ----------  SENSATION TO LIGHT TOUCH:  DEEP PERONEAL/SUPERFICIAL PERONEAL/SURAL/SAPHENOUS/TIBIAL:    " intact  ----------  EDEMA:  no  ERYTHEMA:    no  WOUNDS/INCISIONS:   yes, well healed surgical incision without evidence of erythema or drainage  _____________________________________________________________________  _____________________________________________________________________    RADIOGRAPHIC FINDINGS:   Indication: Status post left total knee arthroplasty    Comparison: Todays xrays were compared to previous xrays from 10/4/2022    Knee films: Demonstrate well positioned knee arthroplasty components in satisfactory alignment without evidence of wear, loosening, subsidence, fracture, or osteolysis and No significant changes compared to prior radiographs.       Assessment/Plan:   Diagnosis Plan   1. Status post total left knee replacement          Patient is doing well 6-week status post left total knee arthroplasty.  I encouraged her to continue working on maintaining range of motion and strengthening as well as gait training.  I will see her back in 2 months for repeat assessment x-ray 3 views left knee on return.    Cayetano King MD  10/25/22  09:57 EDT

## 2022-10-26 ENCOUNTER — HOSPITAL ENCOUNTER (OUTPATIENT)
Dept: PHYSICAL THERAPY | Facility: HOSPITAL | Age: 47
Setting detail: THERAPIES SERIES
Discharge: HOME | End: 2022-10-26

## 2022-10-26 PROCEDURE — 97110 THERAPEUTIC EXERCISES: CPT | Performed by: GENERAL ACUTE CARE HOSPITAL

## 2022-10-26 PROCEDURE — 97140 MANUAL THERAPY 1/> REGIONS: CPT | Performed by: GENERAL ACUTE CARE HOSPITAL

## 2022-10-26 NOTE — THERAPY TREATMENT NOTE
Outpatient Physical Therapy Ortho Treatment Note  [facility]     Patient Name: Sully Yost  : 1975  MRN: [id number]  Today's Date: 10/26/2022      Visit Date: 10/26/2022    Visit Dx:  No diagnosis found.    Patient Active Problem List   Diagnosis   • Primary osteoarthritis of left knee   • Status post total left knee replacement   • Asthma   • Acute postoperative pain   • Acute blood loss anemia, asymptomatic        Past Medical History:   Diagnosis Date   • Anemia    • Anesthesia complication     per pt, she stopped breathing on table during cyst removal and required resucitation   • Anxiety    • Arthritis    • Asthma    • Depression    • GERD (gastroesophageal reflux disease)    • Heart murmur    • Migraine    • Sleep apnea     self-diagnosed, has not had study to confirm        Past Surgical History:   Procedure Laterality Date   • CYST REMOVAL     • TOTAL KNEE ARTHROPLASTY Left 2022    Procedure: TOTAL KNEE ARTHROPLASTY LEFT;  Surgeon: Cayetano King MD;  Location: [facility];  Service: Orthopedics;  Laterality: Left;   • VAGINAL DELIVERY      x5                        PT Assessment/Plan     Row Name 10/26/22 8906          PT Assessment    Assessment Comments Patient remains limited in her active range of motion of knee flexion.  This was addressed with today's exercises with patient doing well at the conclusion.  -HP        PT Plan    PT Plan Comments Continue per plan of care and progressing patient as necessary.  -           User Key  (r) = Recorded By, (t) = Taken By, (c) = Cosigned By    Initials Name Provider Type    Alfonso Hodges, PT Physical Therapist                   OP Exercises     Row Name 10/26/22 0994             Subjective Comments    Subjective Comments Pt states that she is having some soreness  -         Subjective Pain    Able to rate subjective pain? yes  -HP      Pre-Treatment Pain Level 4  -HP      Post-Treatment Pain Level 3  -HP         Total  Minutes    58285 - PT Therapeutic Exercise Minutes 30  -HP      62026 - PT Manual Therapy Minutes 10  -HP         Exercise 1    Exercise Name 1 Nustep  -HP      Time 1 5 min  -HP      Additional Comments L6  -HP         Exercise 2    Exercise Name 2 Recumbent bike  -HP      Time 2 5 min  -HP      Additional Comments L5  -HP         Exercise 3    Exercise Name 3 Contract relax: knee flexion and ext with added passive knee flexion following each  -HP      Sets 3 2  -HP      Reps 3 10 each  -HP         Exercise 4    Exercise Name 4 Total gym squats  -HP      Sets 4 2  -HP      Reps 4 10  -HP      Additional Comments Second set with 3 sec hold  -HP            User Key  (r) = Recorded By, (t) = Taken By, (c) = Cosigned By    Initials Name Provider Type     Alfonso Hand, PT Physical Therapist                         Manual Rx (last 36 hours)     Manual Treatments     Row Name 10/26/22 0945             Total Minutes    72155 - PT Manual Therapy Minutes 10  -HP         Manual Rx 1    Manual Rx 1 Location Left knee  -HP      Manual Rx 1 Type Soft tissue mobilization with use of tools with light to moderate pressure along the entirety of the left knee  -HP      Manual Rx 1 Duration 10  -HP            User Key  (r) = Recorded By, (t) = Taken By, (c) = Cosigned By    Initials Name Provider Type     Alfonso Hand, PT Physical Therapist                                   Time Calculation:   Start Time: 0945  Timed Charges  42289 - PT Therapeutic Exercise Minutes: 30  69871 - PT Manual Therapy Minutes: 10  Total Minutes  Timed Charges Total Minutes: 40   Total Minutes: 40  Therapy Charges for Today     Code Description Service Date Service Provider Modifiers Qty    92557035950 HC PT THER PROC EA 15 MIN 10/26/2022 Alfonso Hand, PT GP 2    65350057653 HC PT MANUAL THERAPY EA 15 MIN 10/26/2022 Alfonso Hand, PT GP 1                    Alfonso Hand PT  10/26/2022

## 2022-10-31 ENCOUNTER — HOSPITAL ENCOUNTER (OUTPATIENT)
Dept: PHYSICAL THERAPY | Facility: HOSPITAL | Age: 47
Setting detail: THERAPIES SERIES
Discharge: HOME | End: 2022-10-31

## 2022-10-31 DIAGNOSIS — Z96.652 STATUS POST TOTAL LEFT KNEE REPLACEMENT: Primary | ICD-10-CM

## 2022-10-31 PROCEDURE — 97110 THERAPEUTIC EXERCISES: CPT

## 2022-11-02 ENCOUNTER — HOSPITAL ENCOUNTER (OUTPATIENT)
Dept: PHYSICAL THERAPY | Facility: HOSPITAL | Age: 47
Setting detail: THERAPIES SERIES
Discharge: HOME | End: 2022-11-02

## 2022-11-02 DIAGNOSIS — Z96.652 STATUS POST TOTAL LEFT KNEE REPLACEMENT: Primary | ICD-10-CM

## 2022-11-02 PROCEDURE — 97110 THERAPEUTIC EXERCISES: CPT | Performed by: GENERAL ACUTE CARE HOSPITAL

## 2022-11-02 PROCEDURE — 97140 MANUAL THERAPY 1/> REGIONS: CPT | Performed by: GENERAL ACUTE CARE HOSPITAL

## 2022-11-02 NOTE — THERAPY TREATMENT NOTE
Outpatient Physical Therapy Ortho Treatment Note  [facility]     Patient Name: Sully Yost  : 1975  MRN: [id number]  Today's Date: 2022      Visit Date: 2022    Visit Dx:    ICD-10-CM ICD-9-CM   1. Status post total left knee replacement  Z96.652 V43.65       Patient Active Problem List   Diagnosis   • Primary osteoarthritis of left knee   • Status post total left knee replacement   • Asthma   • Acute postoperative pain   • Acute blood loss anemia, asymptomatic        Past Medical History:   Diagnosis Date   • Anemia    • Anesthesia complication     per pt, she stopped breathing on table during cyst removal and required resucitation   • Anxiety    • Arthritis    • Asthma    • Depression    • GERD (gastroesophageal reflux disease)    • Heart murmur    • Migraine    • Sleep apnea     self-diagnosed, has not had study to confirm        Past Surgical History:   Procedure Laterality Date   • CYST REMOVAL     • TOTAL KNEE ARTHROPLASTY Left 2022    Procedure: TOTAL KNEE ARTHROPLASTY LEFT;  Surgeon: Cayetano King MD;  Location: [facility];  Service: Orthopedics;  Laterality: Left;   • VAGINAL DELIVERY      x5                        PT Assessment/Plan     Row Name 22 1400          PT Assessment    Assessment Comments Patient continues to do well with therapy and does not have significant increased complaints of pain.  However, patient does continue to note stiffness in the left knee.  Patient tolerated today's increased resistance based movements with out any increased complaints of pain.  -HP        PT Plan    PT Plan Comments Continue to progress pt as able  -           User Key  (r) = Recorded By, (t) = Taken By, (c) = Cosigned By    Initials Name Provider Type    Alfonso Hodges, PT Physical Therapist                   OP Exercises     Row Name 22 1300             Subjective Comments    Subjective Comments Pt states that she is just having stiffness more than  anything  -HP         Subjective Pain    Able to rate subjective pain? yes  -HP      Pre-Treatment Pain Level 3  -HP      Post-Treatment Pain Level 2  -HP         Total Minutes    88199 - PT Therapeutic Exercise Minutes 30  -HP      74906 - PT Manual Therapy Minutes 10  -HP         Exercise 1    Exercise Name 1 Nustep  -HP      Time 1 5 min  -HP         Exercise 2    Exercise Name 2 Recumbent bike  -HP      Time 2 5 min  -HP         Exercise 3    Exercise Name 3 Contract relax: knee flexion and ext with added passive knee flexion following each  -HP      Sets 3 2  -HP      Reps 3 10 each  -HP         Exercise 4    Exercise Name 4 STS with 15# KB  -HP      Sets 4 2  -HP      Reps 4 10  -HP         Exercise 5    Exercise Name 5 STS with emphasis on L LE use  -HP      Sets 5 2  -HP      Reps 5 10  -HP         Exercise 6    Exercise Name 6 LAQ with 5# ankle weight  -HP      Reps 6 10  -HP         Exercise 7    Exercise Name 7 Knee curls with 5# ankle weight  -HP      Reps 7 10  -HP         Exercise 8    Exercise Name 8 Seated marches with 5# ankle weight  -HP      Reps 8 10  -HP         Exercise 9    Exercise Name 9 Heel slides  -HP      Reps 9 25  -HP            User Key  (r) = Recorded By, (t) = Taken By, (c) = Cosigned By    Initials Name Provider Type     Alfonso Hand PT Physical Therapist                         Manual Rx (last 36 hours)     Manual Treatments     Row Name 11/02/22 1300             Total Minutes    18298 - PT Manual Therapy Minutes 10  -HP         Manual Rx 1    Manual Rx 1 Location Left knee  -HP      Manual Rx 1 Type Soft tissue mobilization with use of tools with light to moderate pressure along the entirety of the left knee  -HP      Manual Rx 1 Duration 10  -HP            User Key  (r) = Recorded By, (t) = Taken By, (c) = Cosigned By    Initials Name Provider Type     Alfonso Hand PT Physical Therapist                                   Time Calculation:   Start Time: 1300  Timed  Charges  11912 - PT Therapeutic Exercise Minutes: 30  37447 - PT Manual Therapy Minutes: 10  Total Minutes  Timed Charges Total Minutes: 40   Total Minutes: 40  Therapy Charges for Today     Code Description Service Date Service Provider Modifiers Qty    20620555472  PT THER PROC EA 15 MIN 11/2/2022 Alfonso Hand, PT GP 2    [id number]  PT MANUAL THERAPY EA 15 MIN 11/2/2022 Alfonso Hand, PT GP 1                    Alfonso Hand, PT  11/2/2022

## 2022-11-07 ENCOUNTER — HOSPITAL ENCOUNTER (OUTPATIENT)
Dept: PHYSICAL THERAPY | Facility: HOSPITAL | Age: 47
Setting detail: THERAPIES SERIES
Discharge: HOME | End: 2022-11-07

## 2022-11-07 DIAGNOSIS — Z96.652 STATUS POST TOTAL LEFT KNEE REPLACEMENT: Primary | ICD-10-CM

## 2022-11-07 PROCEDURE — 97110 THERAPEUTIC EXERCISES: CPT

## 2022-11-07 NOTE — THERAPY PROGRESS REPORT/RE-CERT
Outpatient Physical Therapy Ortho Progress Note  [facility]     Patient Name: Sully Yost  : 1975  MRN: [id number]  Today's Date: 2022      Visit Date: 2022    Visit Dx:    ICD-10-CM ICD-9-CM   1. Status post total left knee replacement  Z96.652 V43.65       Patient Active Problem List   Diagnosis   • Primary osteoarthritis of left knee   • Status post total left knee replacement   • Asthma   • Acute postoperative pain   • Acute blood loss anemia, asymptomatic        Past Medical History:   Diagnosis Date   • Anemia    • Anesthesia complication     per pt, she stopped breathing on table during cyst removal and required resucitation   • Anxiety    • Arthritis    • Asthma    • Depression    • GERD (gastroesophageal reflux disease)    • Heart murmur    • Migraine    • Sleep apnea     self-diagnosed, has not had study to confirm        Past Surgical History:   Procedure Laterality Date   • CYST REMOVAL     • TOTAL KNEE ARTHROPLASTY Left 2022    Procedure: TOTAL KNEE ARTHROPLASTY LEFT;  Surgeon: Cayetano King MD;  Location: [facility];  Service: Orthopedics;  Laterality: Left;   • VAGINAL DELIVERY      x5        PT Ortho     Row Name 22 1300       Left Lower Ext    Lt Knee Extension/Flexion AROM 0-111 this date, 115 deg last session. She is having more stiffness this week due to return to work and unable to perform HEP as often.  -       MMT (Manual Muscle Testing)    General MMT Comments 10 SLR without extensor lag  -          User Key  (r) = Recorded By, (t) = Taken By, (c) = Cosigned By    Initials Name Provider Type     Suman Franco, PT Physical Therapist                             PT Assessment/Plan     Row Name 22 1300          PT Assessment    Functional Limitations Decreased safety during functional activities;Impaired gait;Impaired locomotion;Limitation in home management;Performance in self-care ADL;Performance in leisure activities;Limitations  in functional capacity and performance;Performance in work activities  -     Impairments Balance;Endurance;Gait;Sensation;Range of motion;Pain;Muscle strength;Joint mobility;Joint integrity  -     Assessment Comments Patient progressing nicely with her knee rehab. She has full extension and needs about 10-15 degrees of flexion for full range of motion. She has been able to meet some of her goals and improved her LEFS score from 50 to 59. She is ambulating well without an AD. She has minimal to no pain but does have residual swelling in her knee still. Skilled physical therapy services warranted to address improvement upon listed deficits, meet patient goals, and maximize function.  -     Please refer to paper survey for additional self-reported information Yes  -     Rehab Potential Good  -     Patient/caregiver participated in establishment of treatment plan and goals Yes  -     Patient would benefit from skilled therapy intervention Yes  -        PT Plan    PT Frequency 2x/week  -     Predicted Duration of Therapy Intervention (PT) 20 visits total  -     Planned CPT's? PT EVAL LOW COMPLEXITY: 93794;PT THER PROC EA 15 MIN: 26373;PT THER ACT EA 15 MIN: 41833;PT MANUAL THERAPY EA 15 MIN: 92164;PT GAIT TRAINING EA 15 MIN: 89457  -     PT Plan Comments Plan to address patient’s impairments and limitations with skilled PT interventions focusing on improving mobility for ability to perform daily activities.  -           User Key  (r) = Recorded By, (t) = Taken By, (c) = Cosigned By    Initials Name Provider Type    Suman Perez, PT Physical Therapist                   OP Exercises     Row Name 11/07/22 1300             Subjective Comments    Subjective Comments Patient reports she is more stiff this week due to returning to week.  -         Subjective Pain    Able to rate subjective pain? yes  -      Pre-Treatment Pain Level 0  -      Post-Treatment Pain Level 0  -         Total Minutes     32762 - PT Therapeutic Exercise Minutes 40  -JH      78040 - PT Manual Therapy Minutes 5  -JH         Exercise 1    Exercise Name 1 Nustep  -      Time 1 5 min  -         Exercise 2    Exercise Name 2 Recumbent bike  -      Time 2 8  -JH         Exercise 3    Exercise Name 3 Leg press single leg  -JH      Sets 3 2  -JH      Reps 3 10  -JH      Additional Comments 3 pl  -JH         Exercise 4    Exercise Name 4 single leg STS  -JH      Sets 4 3  -JH      Reps 4 10  -JH         Exercise 5    Exercise Name 5 heel slides  -      Sets 5 2  -JH      Reps 5 10  -JH         Exercise 6    Exercise Name 6 SLR  -      Reps 6 10  -JH         Exercise 7    Exercise Name 7 Hip abduction machine  -JH      Sets 7 2  -JH      Reps 7 10  -JH      Additional Comments 2 pl  -JH         Exercise 8    Exercise Name 8 Time to update objective measures, outcomes, goals, review [facility]  -      Time 8 7 min  -            User Key  (r) = Recorded By, (t) = Taken By, (c) = Cosigned By    Initials Name Provider Type    Suman Perez, PT Physical Therapist                         Manual Rx (last 36 hours)     Manual Treatments     Row Name 11/07/22 1300             Total Minutes    79425 - PT Manual Therapy Minutes 5  -JH         Manual Rx 1    Manual Rx 1 Location Left knee  -      Manual Rx 1 Type PROM left knee flexion and ext  -      Manual Rx 1 Duration 5  -JH            User Key  (r) = Recorded By, (t) = Taken By, (c) = Cosigned By    Initials Name Provider Type    Suman Perez, PT Physical Therapist                 PT OP Goals     Row Name 11/07/22 1300          PT Short Term Goals    STG Date to Achieve 11/02/22  -     STG 1 Compliant with HEP  -     STG 1 Progress Met  -     STG 2 Symptoms improved to 50% or more  -     STG 2 Progress Met  [facility]     STG 3 PROM 0-125 left knee for improved function  -     STG 3 Progress Ongoing  -        Long Term Goals    LTG Date to Achieve 11/23/22  -     LTG 1  AROM 0-125 left knee for improved function with daily activities  -     LTG 1 Progress Ongoing  -     LTG 2 Patient will be able to navigate a flight of stairs ind with no safety issues  -     LTG 2 Progress Ongoing  -     LTG 3 Pt will be able to ambulate with no significant gait deviations and no AD required  -     LTG 3 Progress Met  -[facility] 4 LEFS to 70 or more to indicate improved function  -[facility] 4 Progress Ongoing  -        Time Calculation    PT Goal Re-Cert Due Date 01/10/23  -           User Key  (r) = Recorded By, (t) = Taken By, (c) = Cosigned By    Initials Name Provider Type    Suman Perez, PT Physical Therapist                        Lower Extremity Functional Index  Any of your usual work, housework or school activities: No difficulty  Your usual hobbies, recreational or sporting activities: No difficulty  Getting into or out of the bath: No difficulty  Walking between rooms: No difficulty  Putting on your shoes or socks: A little bit of difficulty  Squatting: No difficulty  Lifting an object, like a bag of groceries from the floor: No difficulty  Performing light activities around your home: No difficulty  Performing heavy activities around your home: No difficulty  Getting into or out of a car: No difficulty  Walking 2 blocks: No difficulty  Walking a mile: No difficulty  Going up or down 10 stairs (about 1 flight of stairs): No difficulty  Standing for 1 hour: Extreme difficulty or unable to perform activity  Sitting for 1 hour: No difficulty  Running on even ground: Extreme difficulty or unable to perform activity  Running on uneven ground: Extreme difficulty or unable to perform activity  Making sharp turns while running fast: Extreme difficulty or unable to perform activity  Hopping: Extreme difficulty or unable to perform activity  Rolling over in bed: No difficulty  Total: 59      Time Calculation:   Start Time: 1300  Timed Charges  13890 - PT Therapeutic Exercise  Minutes: 40  29713 - PT Manual Therapy Minutes: 5  Total Minutes  Timed Charges Total Minutes: 45   Total Minutes: 45  Therapy Charges for Today     Code Description Service Date Service Provider Modifiers Qty    84493444228 HC PT THER PROC EA 15 MIN 11/7/2022 Suman Franco, PT GP 3          PT G-Codes  Total: 59         Suman Franco, PT  11/7/2022

## 2022-11-09 ENCOUNTER — HOSPITAL ENCOUNTER (OUTPATIENT)
Dept: PHYSICAL THERAPY | Facility: HOSPITAL | Age: 47
Setting detail: THERAPIES SERIES
Discharge: HOME | End: 2022-11-09

## 2022-11-09 DIAGNOSIS — Z96.652 STATUS POST TOTAL LEFT KNEE REPLACEMENT: Primary | ICD-10-CM

## 2022-11-09 PROCEDURE — 97110 THERAPEUTIC EXERCISES: CPT

## 2022-11-09 NOTE — THERAPY TREATMENT NOTE
Outpatient Physical Therapy Ortho Treatment Note  [facility]     Patient Name: Sully Yost  : 1975  MRN: [id number]  Today's Date: 2022      Visit Date: 2022    Visit Dx:    ICD-10-CM ICD-9-CM   1. Status post total left knee replacement  Z96.652 V43.65       Patient Active Problem List   Diagnosis   • Primary osteoarthritis of left knee   • Status post total left knee replacement   • Asthma   • Acute postoperative pain   • Acute blood loss anemia, asymptomatic        Past Medical History:   Diagnosis Date   • Anemia    • Anesthesia complication     per pt, she stopped breathing on table during cyst removal and required resucitation   • Anxiety    • Arthritis    • Asthma    • Depression    • GERD (gastroesophageal reflux disease)    • Heart murmur    • Migraine    • Sleep apnea     self-diagnosed, has not had study to confirm        Past Surgical History:   Procedure Laterality Date   • CYST REMOVAL     • TOTAL KNEE ARTHROPLASTY Left 2022    Procedure: TOTAL KNEE ARTHROPLASTY LEFT;  Surgeon: Cayetano King MD;  Location: [facility];  Service: Orthopedics;  Laterality: Left;   • VAGINAL DELIVERY      x5                        PT Assessment/Plan     Row Name 22 1300          PT Assessment    Assessment Comments Patient requiring BUE support for some single leg balance but able to perform without LOB, she shows good ability to self recover. We progressed to more functional activities such as sled pushes and pulls in clinic. She is doing well thus far overall.  -        PT Plan    PT Plan Comments Cont per POC  -           User Key  (r) = Recorded By, (t) = Taken By, (c) = Cosigned By    Initials Name Provider Type    Suman Perez, PT Physical Therapist                   OP Exercises     Row Name 22 1300             Subjective Pain    Able to rate subjective pain? yes  -      Pre-Treatment Pain Level 0  -      Post-Treatment Pain Level 0  -          Total Minutes    29919 - PT Therapeutic Exercise Minutes 40  -JH         Exercise 1    Exercise Name 1 Nustep  -      Time 1 5 min  -JH         Exercise 2    Exercise Name 2 Recumbent bike  -JH      Time 2 8  -JH         Exercise 3    Exercise Name 3 Leg press 5 pl  -JH      Sets 3 3  -JH      Reps 3 10  -JH         Exercise 4    Exercise Name 4 single leg STS  -JH      Sets 4 3  -JH      Reps 4 10  -JH         Exercise 5    Exercise Name 5 single leg balance on black table top and air ex pad  -      Time 5 4 min  -JH         Exercise 6    Exercise Name 6 LAQ and standing hamstring curls  -      Sets 6 2  -JH      Reps 6 15  -JH         Exercise 7    Exercise Name 7 sled push and pulls  -      Time 7 9 laps down and back  -            User Key  (r) = Recorded By, (t) = Taken By, (c) = Cosigned By    Initials Name Provider Type    Suman Perez, PT Physical Therapist                                                Time Calculation:   Start Time: 1300  Timed Charges  52365 - PT Therapeutic Exercise Minutes: 40  Total Minutes  Timed Charges Total Minutes: 40   Total Minutes: 40  Therapy Charges for Today     Code Description Service Date Service Provider Modifiers Qty    84895576338  PT THER PROC EA 15 MIN 11/9/2022 Suman Franco, PT GP 3                    Suman Franco PT  11/9/2022

## 2022-11-16 ENCOUNTER — HOSPITAL ENCOUNTER (OUTPATIENT)
Dept: PHYSICAL THERAPY | Facility: HOSPITAL | Age: 47
Setting detail: THERAPIES SERIES
Discharge: HOME | End: 2022-11-16

## 2022-11-16 DIAGNOSIS — Z96.652 STATUS POST TOTAL LEFT KNEE REPLACEMENT: Primary | ICD-10-CM

## 2022-11-16 PROCEDURE — 97110 THERAPEUTIC EXERCISES: CPT

## 2022-11-16 NOTE — THERAPY PROGRESS REPORT/RE-CERT
Outpatient Physical Therapy Ortho Progress Note  [facility]     Patient Name: Sully Yost  : 1975  MRN: [id number]  Today's Date: 2022      Visit Date: 2022    Visit Dx:    ICD-10-CM ICD-9-CM   1. Status post total left knee replacement  Z96.652 V43.65       Patient Active Problem List   Diagnosis   • Primary osteoarthritis of left knee   • Status post total left knee replacement   • Asthma   • Acute postoperative pain   • Acute blood loss anemia, asymptomatic        Past Medical History:   Diagnosis Date   • Anemia    • Anesthesia complication     per pt, she stopped breathing on table during cyst removal and required resucitation   • Anxiety    • Arthritis    • Asthma    • Depression    • GERD (gastroesophageal reflux disease)    • Heart murmur    • Migraine    • Sleep apnea     self-diagnosed, has not had study to confirm        Past Surgical History:   Procedure Laterality Date   • CYST REMOVAL     • TOTAL KNEE ARTHROPLASTY Left 2022    Procedure: TOTAL KNEE ARTHROPLASTY LEFT;  Surgeon: Cayetano King MD;  Location: [facility];  Service: Orthopedics;  Laterality: Left;   • VAGINAL DELIVERY      x5        PT Ortho     Row Name 22 1300       Posture/Observations    Observations Edema  left knee  -       Left Lower Ext    Lt Knee Extension/Flexion AROM 0-112, limited by stiffness and swelling  -       MMT (Manual Muscle Testing)    General MMT Comments listed in exercise section  -          User Key  (r) = Recorded By, (t) = Taken By, (c) = Cosigned By    Initials Name Provider Type     Suman Franco, PT Physical Therapist                             PT Assessment/Plan     Row Name 22 1300          PT Assessment    Functional Limitations Impaired gait;Performance in leisure activities;Performance in work activities  -     Impairments Balance;Endurance;Gait;Range of motion;Pain;Muscle strength  -     Assessment Comments Patient is doing well  within her plan of care and showing continued strength and overall functional mobility. She was able to improve LEFS to a 62 and meet some of her goals. She is mostly limited with gaining full knee flexion. She is about 10-15 degrees away from full flexion and seems to be limited due to increased swelling. She would benefit from continued services to gain full ROM, strength, and functional mobility.  -     Please refer to paper survey for additional self-reported information Yes  -     Rehab Potential Good  -     Patient/caregiver participated in establishment of treatment plan and goals Yes  -     Patient would benefit from skilled therapy intervention Yes  -        PT Plan    PT Frequency 2x/week  -     Predicted Duration of Therapy Intervention (PT) 20 visits total  -     Planned CPT's? PT EVAL LOW COMPLEXITY: 28588;PT THER PROC EA 15 MIN: 46190;PT MANUAL THERAPY EA 15 MIN: 28902;PT GAIT TRAINING EA 15 MIN: 53028  -     PT Plan Comments Focus on gaining full ROM, maximal strength, and working on proper movement patterns.  -           User Key  (r) = Recorded By, (t) = Taken By, (c) = Cosigned By    Initials Name Provider Type     Suman Franco, PT Physical Therapist                   OP Exercises     Row Name 11/16/22 1300             Subjective Comments    Subjective Comments Patient reports that she is wanting the swelling to go down.  -         Subjective Pain    Able to rate subjective pain? yes  -      Pre-Treatment Pain Level 0  -      Post-Treatment Pain Level 0  -         Total Minutes    81150 - PT Therapeutic Exercise Minutes 42  -         Exercise 1    Exercise Name 1 Nustep L6  -      Time 1 5 min  -         Exercise 2    Exercise Name 2 Air Squats  -      Cueing 2 Verbal;Tactile;Demo  -      Sets 2 3  -      Reps 2 10-15  -      Time 2 --  -         Exercise 3    Exercise Name 3 Leg press 5 pl  -      Sets 3 3  -JH      Reps 3 10  -JH         Exercise 4     Exercise Name 4 single leg STS  -      Sets 4 3  -JH      Reps 4 10  -JH         Exercise 5    Exercise Name 5 Table STS 15# KB  -      Sets 5 2  -      Reps 5 15  -      Time 5 --  -         Exercise 6    Exercise Name 6 Ball DKTC  -      Sets 6 --  -      Reps 6 20  -JH         Exercise 7    Exercise Name 7 sled push and pulls  -      Time 7 9 laps down and back  -         Exercise 8    Exercise Name 8 Time to update outcomes, goals, and HEP.  -      Reps 8 6 min  -            User Key  (r) = Recorded By, (t) = Taken By, (c) = Cosigned By    Initials Name Provider Type    Suman Perez, PT Physical Therapist                              PT OP Goals     Row Name 11/16/22 1300          PT Short Term Goals    STG Date to Achieve 11/02/22  -     STG 1 Compliant with HEP  -     STG 1 Progress Met  -     STG 2 Symptoms improved to 50% or more  -     STG 2 Progress Met  -     STG 3 PROM 0-125 left knee for improved function  -     STG 3 Progress Ongoing  -        Long Term Goals    LTG Date to Achieve 11/23/22  -     LTG 1 AROM 0-125 left knee for improved function with daily activities  -     LTG 1 Progress Ongoing  -     LTG 2 Patient will be able to navigate a flight of stairs ind with no safety issues  -     LTG 2 Progress Met  -     LTG 3 Pt will be able to ambulate with no significant gait deviations and no AD required  -     LTG 3 Progress Met  -     LTG 4 LEFS to 60 or more to indicate improved function  -     LT 4 Progress Goal Revised;Met  -        Time Calculation    PT Goal Re-Cert Due Date 01/10/23  -           User Key  (r) = Recorded By, (t) = Taken By, (c) = Cosigned By    Initials Name Provider Type    Suman Perez, PT Physical Therapist                Therapy Education  Education Details: HEP included: heel slides supine and on wall, bridges, SL squats, squats  Given: HEP, Symptoms/condition management, Pain management, Posture/body  mechanics, Fall prevention and home safety, Mobility training  Program: Progressed  How Provided: Verbal, Demonstration, Written  Provided to: Patient  Level of Understanding: Teach back education performed, Verbalized, Demonstrated       Lower Extremity Functional Index  Any of your usual work, housework or school activities: No difficulty  Your usual hobbies, recreational or sporting activities: No difficulty  Getting into or out of the bath: No difficulty  Walking between rooms: No difficulty  Putting on your shoes or socks: A little bit of difficulty  Squatting: No difficulty  Lifting an object, like a bag of groceries from the floor: No difficulty  Performing light activities around your home: No difficulty  Performing heavy activities around your home: No difficulty  Getting into or out of a car: No difficulty  Walking 2 blocks: No difficulty  Walking a mile: No difficulty  Going up or down 10 stairs (about 1 flight of stairs): A little bit of difficulty  Standing for 1 hour: No difficulty  Sitting for 1 hour: No difficulty  Running on even ground: Extreme difficulty or unable to perform activity  Running on uneven ground: Extreme difficulty or unable to perform activity  Making sharp turns while running fast: Extreme difficulty or unable to perform activity  Hopping: Extreme difficulty or unable to perform activity  Rolling over in bed: No difficulty  Total: 62      Time Calculation:   Start Time: 1300  Timed Charges  35701 - PT Therapeutic Exercise Minutes: 42  Total Minutes  Timed Charges Total Minutes: 42   Total Minutes: 42  Therapy Charges for Today     Code Description Service Date Service Provider Modifiers Qty    40158176697 HC PT THER PROC EA 15 MIN 11/16/2022 Suman Franco, PT GP 3          PT G-Codes  Total: 62         Suman Franco PT  11/16/2022

## 2022-11-18 ENCOUNTER — HOSPITAL ENCOUNTER (OUTPATIENT)
Dept: PHYSICAL THERAPY | Facility: HOSPITAL | Age: 47
Setting detail: THERAPIES SERIES
Discharge: HOME | End: 2022-11-18

## 2022-11-18 DIAGNOSIS — Z96.652 STATUS POST TOTAL LEFT KNEE REPLACEMENT: Primary | ICD-10-CM

## 2022-11-18 PROCEDURE — 97110 THERAPEUTIC EXERCISES: CPT

## 2022-11-18 NOTE — THERAPY DISCHARGE NOTE
Outpatient Physical Therapy Ortho Treatment Note/Discharge Summary  [facility]     Patient Name: Sully Yost  : 1975  MRN: [id number]  Today's Date: 2022      Visit Date: 2022    Visit Dx:    ICD-10-CM ICD-9-CM   1. Status post total left knee replacement  Z96.652 V43.65       Patient Active Problem List   Diagnosis   • Primary osteoarthritis of left knee   • Status post total left knee replacement   • Asthma   • Acute postoperative pain   • Acute blood loss anemia, asymptomatic        Past Medical History:   Diagnosis Date   • Anemia    • Anesthesia complication     per pt, she stopped breathing on table during cyst removal and required resucitation   • Anxiety    • Arthritis    • Asthma    • Depression    • GERD (gastroesophageal reflux disease)    • Heart murmur    • Migraine    • Sleep apnea     self-diagnosed, has not had study to confirm        Past Surgical History:   Procedure Laterality Date   • CYST REMOVAL     • TOTAL KNEE ARTHROPLASTY Left 2022    Procedure: TOTAL KNEE ARTHROPLASTY LEFT;  Surgeon: Cayetano King MD;  Location: [facility];  Service: Orthopedics;  Laterality: Left;   • VAGINAL DELIVERY      x5                        PT Assessment/Plan     Row Name 22 1300          PT Assessment    Assessment Comments Patient is showing good ability to perform exercises in clinic and can transition to independent home program at this point. She has sufficient range of motion to perform most functional activities but would benefit from continued ROM focus with HEP to regain full range. She continue to have some residual swelling limiting full flexion. Expect her to continue to have good progress with HEP.  -        PT Plan    PT Plan Comments d/c  -           User Key  (r) = Recorded By, (t) = Taken By, (c) = Cosigned By    Initials Name Provider Type    Suman Perez, PT Physical Therapist                     OP Exercises     Row Name 22  1300             Subjective Comments    Subjective Comments Patient reports feeling comfortable with d.c at this time.  -         Subjective Pain    Able to rate subjective pain? yes  -      Pre-Treatment Pain Level 0  -      Post-Treatment Pain Level 0  -         Total Minutes    95974 - PT Therapeutic Exercise Minutes 38  -JH         Exercise 1    Exercise Name 1 Nustep L8-105  -      Time 1 5 min  -         Exercise 2    Exercise Name 2 Single leg leg press  -      Cueing 2 --  -JH      Sets 2 3  -JH      Reps 2 10  -JH      Additional Comments 2 pl  -         Exercise 3    Exercise Name 3 Leg press 5 pl  -JH      Sets 3 3  -JH      Reps 3 10  -JH         Exercise 4    Exercise Name 4 retro treadmill walking  -      Sets 4 3  -JH      Reps 4 10  -JH         Exercise 5    Exercise Name 5 Table STS 15# KB  -JH      Sets 5 2  -JH      Reps 5 10  -JH         Exercise 6    Exercise Name 6 lateral banded walks yellow TB  -      Reps 6 3 x down and back  -JH         Exercise 7    Exercise Name 7 sled push and pulls  -      Time 7 9 laps down and back  -JH         Exercise 8    Exercise Name 8 hip extension and abduction machine  -      Sets 8 2  -JH      Reps 8 10  -JH         Exercise 9    Exercise Name 9 recumbent bike levl 5  -JH      Time 9 5 min  -            User Key  (r) = Recorded By, (t) = Taken By, (c) = Cosigned By    Initials Name Provider Type     Suman Franco, PT Physical Therapist                                PT OP Goals     Row Name 11/18/22 1300          PT Short Term Goals    STG Date to Achieve 11/02/22  -     STG 1 Compliant with HEP  -     STG 1 Progress Met  -     STG 2 Symptoms improved to 50% or more  -     STG 2 Progress Met  [facility]     STG 3 PROM 0-125 left knee for improved function  -     STG 3 Progress Not Met  [facility]     STG 3 Progress Comments limited by swelling  -        Long Term Goals    LTG Date to Achieve 11/23/22  -     LTG 1 AROM 0-125 left  knee for improved function with daily activities  -     LTG 1 Progress Not Met  -     LTG 1 Progress Comments limited by swelling  -     LTG 2 Patient will be able to navigate a flight of stairs ind with no safety issues  -     LTG 2 Progress Met  -     LTG 3 Pt will be able to ambulate with no significant gait deviations and no AD required  -     LTG 3 Progress Met  -[facility] 4 LEFS to 60 or more to indicate improved function  -[facility] 4 Progress Met  -        Time Calculation    PT Goal Re-Cert Due Date 01/10/23  -           User Key  (r) = Recorded By, (t) = Taken By, (c) = Cosigned By    Initials Name Provider Type    Suman Perez, PT Physical Therapist                               Time Calculation:   Start Time: 1300  Timed Charges  61804 - PT Therapeutic Exercise Minutes: 38  Total Minutes  Timed Charges Total Minutes: 38   Total Minutes: 38  Therapy Charges for Today     Code Description Service Date Service Provider Modifiers Qty    65685466974 HC PT THER PROC EA 15 MIN 11/18/2022 Suman Franco, PT GP 3                OP PT Discharge Summary  Date of Discharge: 11/18/22  Reason for Discharge: At baseline function  Outcomes Achieved: Patient able to partially acheive established goals  Discharge Destination: Home with home program      Suman Franco PT  11/18/2022

## 2022-11-22 ENCOUNTER — TELEPHONE (OUTPATIENT)
Dept: ORTHOPEDIC SURGERY | Facility: CLINIC | Age: 47
End: 2022-11-22

## 2022-11-22 NOTE — TELEPHONE ENCOUNTER
ATTEMPTED TO WARM TRANSFER    Provider:  DR. LOGAN BRANDT  Caller: MEGHAN KAYE  Relationship to Patient: SELF  Pharmacy:  [facility] [phone]  Phone Number:  [phone]  Reason for Call: PATIENT HAD LEFT TKA ON 9/14/22. ABOUT 1 1/2 WEEKS AGO PATIENT SAYS SHE HIT HER KNEE INCISION ON SOMETHING AND NOW SHE SAYS SHE HAS A SMALL OPENING IN THE INCISION AND A LITTLE CLEAR FLUID COMING OUT. SHE HAS NOTICED SOME LOWER EXTREMITY SWELLING AND TODAY SHE IS SWOLLEN FROM HER LEFT KNEE TO HER LEFT ANKLE AND HAS FLUID IN HER ANKLE. PLEASE ADVISE.  When was the patient last seen: 10/25/22

## 2022-11-22 NOTE — TELEPHONE ENCOUNTER
Spoke with patient.  She says she hit her operative knee on a corner of table while trying to stop from falling. There is a spot on the incision and she sent a picture for evaluation.  There is swelling from her knee to her ankle.  She says she does not have pain with bending or stairs and and flex the knee 110-120. I showed Dr. King the image patient sent, he would to see her in the office for evaluation.  She is unable to come in today due to staffing at work.  Dr. King is ok for her to see Georgette Erwin tomorrow.  Patient says she will try to make it at 0900 tomorrow morning with Georgette.  I went ahead and scheduled the appointment to secure her spot, if she calls back to reschedule please transfer her to the clinic and ask for Talia.    Talia Garcia RT(R)

## 2022-11-23 ENCOUNTER — TELEPHONE (OUTPATIENT)
Dept: ORTHOPEDIC SURGERY | Facility: CLINIC | Age: 47
End: 2022-11-23

## 2022-11-23 NOTE — TELEPHONE ENCOUNTER
Attempted to call patient at 8:25am, she has not shown up for her 9:00 am appt. Voicemail was not set up on her phone.     Called patient at 11:35, she answered and said she couldn't find her car keys this am and had to get a ride to work, couldn't make it to the office for an appointment. I offered her the rest of the day today to be seen, she declined. Scheduled her on Tuesday with Georgette, while Dr King is here for re-examination and wound check.

## 2022-11-29 ENCOUNTER — TELEPHONE (OUTPATIENT)
Dept: ORTHOPEDIC SURGERY | Facility: CLINIC | Age: 47
End: 2022-11-29

## 2022-11-29 NOTE — TELEPHONE ENCOUNTER
As long as there is no concern for DVT, I would recommend she use knee-high low pressure compression stockings every day.  This will help with the fatigue and swelling and soreness.  The left side is probably more swollen than the other secondary to recent knee replacement.

## 2022-11-29 NOTE — TELEPHONE ENCOUNTER
Delete after reviewing: Telephone encounter to be sent to clinical pool.    Caller: Sully Yost     Relationship: SELF     Best call back number: [phone]    What is your medical concern? HAS HAD SWELLING AND PAIN IN ANKLE AND WANTS TO KNOW IF IT NORMAL

## 2022-11-29 NOTE — TELEPHONE ENCOUNTER
Georgette,    I called patient and she explains that her left ankle is swollen. She does not have any redness it is not hot and she is not having any calf pain. She has not been able to do may exercises or elevate recently as she is back to work now. Please advise thank you.  Ludy Osborne RT (R), ROT

## 2022-12-02 ENCOUNTER — OFFICE VISIT (OUTPATIENT)
Dept: ORTHOPEDIC SURGERY | Facility: CLINIC | Age: 47
End: 2022-12-02

## 2022-12-02 ENCOUNTER — TELEPHONE (OUTPATIENT)
Dept: ORTHOPEDIC SURGERY | Facility: CLINIC | Age: 47
End: 2022-12-02

## 2022-12-02 DIAGNOSIS — I87.8 VENOUS STASIS OF BOTH LOWER EXTREMITIES: ICD-10-CM

## 2022-12-02 DIAGNOSIS — Z96.652 STATUS POST TOTAL LEFT KNEE REPLACEMENT: Primary | ICD-10-CM

## 2022-12-02 PROCEDURE — 99024 POSTOP FOLLOW-UP VISIT: CPT | Performed by: PHYSICIAN ASSISTANT

## 2022-12-02 NOTE — TELEPHONE ENCOUNTER
Called patient back and let her know that we usually suggest 8-15 or 10-20 pressure for the compression socks. She understood. She will call back with any further problems or questions.     Narcisa

## 2022-12-02 NOTE — PROGRESS NOTES
[facility] Orthopaedic Surgery Clinic Note      Subjective     CC: Post-op (5 week f/u--2.5 months status post TOTAL KNEE ARTHROPLASTY LEFT 9/14/22)      HPI    Sully Yost is a 47 y.o. female.  Patient returns today for her left knee.  She had concerns regarding the incision after a fall with a scab over the incision.  She is also concerned about increased swelling in the left leg as the day progresses.  She is not having any knee joint pain itself.  She is happy with the outcome of the knee replacement.  No fevers chills, no effusions no warmth no erythema    Overall, patient's symptoms are improved    ROS:    Constiutional:Pt denies fever, chills, nausea, or vomiting.  MSK:as above        Objective      Past Medical History  Past Medical History:   Diagnosis Date   • Anemia    • Anesthesia complication     per pt, she stopped breathing on table during cyst removal and required resucitation   • Anxiety    • Arthritis    • Asthma    • Depression    • GERD (gastroesophageal reflux disease)    • Heart murmur    • Migraine    • Sleep apnea     self-diagnosed, has not had study to confirm         Physical Exam  There were no vitals taken for this visit.    There is no height or weight on file to calculate BMI.    Patient is well nourished and well developed.        Ortho Exam  Left knee exam: Anterior knee incision is well-healed.  Range of motion 0-1 20.  Ligament stable to valgus and varus stress.  Resting intact distally.  1+ bilateral lower extremity edema        Assessment    Assessment:  1. Status post total left knee replacement    2. Venous stasis of both lower extremities        Plan:  1. Recommend over the counter anti-inflammatories for pain and/or swelling  2. Do not status post left total knee arthroplasty with Dr. King on 9/14/2022.  I reassured the patient that her x-rays look great.  Please components are well-positioned with no evidence of osteolysis, subsidence or fracture.  I explained  venous stasis and increased on the left secondary to surgery.  I recommended she wear compression stockings and given her places to get them.  She will return to see Dr. King in September 2023 or sooner if needed.    History, diagnosis and treatment plan discussed with Dr. King.          Georgette Erwin PA-C  12/06/22  09:26 EST      Dragon disclaimer:  Much of this encounter note is an electronic transcription/translation of spoken language to printed text. The electronic translation of spoken language may permit erroneous, or at times, nonsensical words or phrases to be inadvertently transcribed; Although I have reviewed the note for such errors, some may still exist.

## 2022-12-02 NOTE — TELEPHONE ENCOUNTER
"Provider:  ESTHER ROWLEY PA-C  Caller: MEGHAN KAYE  Relationship to Patient: SELF    Phone Number:  [phone]  Reason for Call:  PATIENT WAS SEEN IN OFFICE TODAY AND ADVISED TO GET \"LOW\" LEVEL COMPRESSION SOCKS. PATIENT ASKING IF \"MODERATE\" IS WHAT SHE IS SUPPOSED TO GET. PLEASE ADVISE.    "

## 2022-12-27 ENCOUNTER — TELEPHONE (OUTPATIENT)
Dept: ORTHOPEDIC SURGERY | Facility: CLINIC | Age: 47
End: 2022-12-27

## 2022-12-27 NOTE — TELEPHONE ENCOUNTER
Provider: KARLY  Caller: MEGHAN  Phone Number: [phone]  Reason for Call: PT IS CALLING TO MAKE SURE IF YOU NEED TO SEE HER IN JAN. SHE HAS A ONE YEAR IN SEPT AND WASN'T SURE IF YOU WANTED HER TO KEEP BOTH

## 2023-05-15 ENCOUNTER — TELEPHONE (OUTPATIENT)
Dept: ORTHOPEDIC SURGERY | Facility: CLINIC | Age: 48
End: 2023-05-15
Payer: COMMERCIAL

## 2023-05-15 NOTE — TELEPHONE ENCOUNTER
Verified with Mindi that we will not prescribe any sort of fluid pill.  I offered the patient the names and numbers of primary care providers through [facility].  She says she does not have time to see a primary care provider. Recommended continued use of compression socks and elevate as much as possible.  She has concerns for infection.  She is not running a fever and denies hot to the touch or redness. The knee is not stiff.  I scheduled her an appointment to see Dr. King on the requested day of May 30th.    Talia GARDNER(R)

## 2023-05-15 NOTE — TELEPHONE ENCOUNTER
Provider: KARLY  Caller: MEGHAN  Relationship to Patient: SELF  Pharmacy: [facility]  Phone Number: [phone]  Reason for Call: PT CALLING BECAUSE SHE IS CONCERNED THAT HER LEG IS STILL SWELLING AND POSSIBLY RETAINING LIQUID AFTER HAVING SX 8 MONTHS AGO, PT STATES SHE STILL HAS NUMBING BUT HAS GOTTEN SOME FEELING BACK, SHE WAS REQ IF SHE COULD GET SOME KIND OF FLUID PILLS TO GET RID OF THE FLUID

## 2023-05-15 NOTE — TELEPHONE ENCOUNTER
"Patient with known bilateral lower extremity venous stasis which could be what she is feeling when she describes swelling and retaining fluid in her legs.    She will need to go through her PCP for any type of \"fluid pill\".    Recommend she wear compression socks.  "

## 2023-05-30 ENCOUNTER — OFFICE VISIT (OUTPATIENT)
Dept: ORTHOPEDIC SURGERY | Facility: CLINIC | Age: 48
End: 2023-05-30

## 2023-05-30 VITALS
HEIGHT: 64 IN | SYSTOLIC BLOOD PRESSURE: 124 MMHG | DIASTOLIC BLOOD PRESSURE: 86 MMHG | WEIGHT: 225 LBS | BODY MASS INDEX: 38.41 KG/M2

## 2023-05-30 DIAGNOSIS — Z96.652 STATUS POST TOTAL LEFT KNEE REPLACEMENT: Primary | ICD-10-CM

## 2023-05-30 DIAGNOSIS — I89.0 LYMPHEDEMA OF LEFT LOWER EXTREMITY: ICD-10-CM

## 2023-05-30 NOTE — PROGRESS NOTES
Orthopaedic Clinic Note: Knee Established Patient    Chief Complaint   Patient presents with   • Follow-up     6 month recheck- 8.5 months status post LEFT TOTAL KNEE ARTHROPLASTY 22        HPI    It has been 6  month(s) since Ms. Yost's last visit. She returns to clinic today for follow-up left total knee arthroplasty.  She is 1/2 months out from surgery.  Rates her pain 0/10 on the pain scale.  She is ambulating with no assistive device.  Denies fevers chills or constitutional symptoms.  Overall she is happy with her outcome.    Past Medical History:   Diagnosis Date   • Anemia    • Anesthesia complication     per pt, she stopped breathing on table during cyst removal and required resucitation   • Anxiety    • Arthritis    • Asthma    • Depression    • GERD (gastroesophageal reflux disease)    • Heart murmur    • Migraine    • Sleep apnea     self-diagnosed, has not had study to confirm      Past Surgical History:   Procedure Laterality Date   • CYST REMOVAL     • TOTAL KNEE ARTHROPLASTY Left 2022    Procedure: TOTAL KNEE ARTHROPLASTY LEFT;  Surgeon: Cayetano King MD;  Location: [facility];  Service: Orthopedics;  Laterality: Left;   • VAGINAL DELIVERY      x5      Family History   Problem Relation Age of Onset   • Cancer Mother    • Diabetes Mother    • No Known Problems Father      Social History     Socioeconomic History   • Marital status:    Tobacco Use   • Smoking status: Former     Packs/day: 0.50     Types: Cigarettes     Quit date: 2022     Years since quittin.9   • Smokeless tobacco: Never   Vaping Use   • Vaping Use: Never used   Substance and Sexual Activity   • Alcohol use: Yes     Comment: OCCASIONALLY   • Drug use: No   • Sexual activity: Defer      Current Outpatient Medications on File Prior to Visit   Medication Sig Dispense Refill   • albuterol sulfate  (90 Base) MCG/ACT inhaler Inhale 2 puffs Every 6 (Six) Hours As Needed for Shortness of Air or  "Wheezing.     • aspirin 81 MG EC tablet Take 1 tablet by mouth 2 (Two) Times a Day. 60 tablet 0   • naproxen sodium (ALEVE) 220 MG tablet Take 1 tablet by mouth As Needed.       No current facility-administered medications on file prior to visit.      Allergies   Allergen Reactions   • Penicillins Other (See Comments)     \"MY WHOLE FAMILY IS ALLERGIC AND I DONT WANT TO TAKE A CHANCE\"         Review of Systems   Constitutional: Negative.    HENT: Negative.    Eyes: Negative.    Respiratory: Negative.    Cardiovascular: Negative.    Gastrointestinal: Negative.    Endocrine: Negative.    Genitourinary: Negative.    Musculoskeletal: Positive for arthralgias.   Skin: Negative.    Allergic/Immunologic: Negative.    Neurological: Negative.    Hematological: Negative.    Psychiatric/Behavioral: Negative.         The patient's Review of Systems was personally reviewed and confirmed as accurate.    Physical Exam  Blood pressure 124/86, height 162.6 cm (64\"), weight 102 kg (225 lb).    Body mass index is 38.62 kg/m².    GENERAL APPEARANCE: awake, alert, oriented, in no acute distress and well developed, well nourished  LUNGS:  breathing nonlabored  EXTREMITIES: no clubbing, cyanosis  PERIPHERAL PULSES: palpable dorsalis pedis and posterior tibial pulses bilaterally.    GAIT:  Normal        ----------  Left Knee Exam:  ----------  ALIGNMENT: neutral  ----------  RANGE OF MOTION:  Normal (0-120 degrees) with no extensor lag or flexion contracture  LIGAMENTOUS STABILITY:   stable to varus and valgus stress at terminal extension and 30 degrees without any evidence of laxity  ----------  STRENGTH:  KNEE FLEXION 5/5  KNEE EXTENSION  5/5  ANKLE DORSIFLEXION  5/5  ANKLE PLANTARFLEXION  5/5  ----------  PAIN WITH PALPATION:denies tenderness to palpation about the knee  KNEE EFFUSION: no  PAIN WITH KNEE ROM: no  PATELLAR CREPITUS:  no  ----------  SENSATION TO LIGHT TOUCH:  DEEP PERONEAL/SUPERFICIAL PERONEAL/SURAL/SAPHENOUS/TIBIAL:    " intact  ----------  EDEMA: 1+ edema in ankle  ERYTHEMA:    no  WOUNDS/INCISIONS:   yes, well healed surgical incision without evidence of erythema or drainage  _____________________________________________________________________  _____________________________________________________________________    RADIOGRAPHIC FINDINGS:   Indication: Status post left total knee arthroplasty    Comparison: Todays xrays were compared to previous xrays from 12/2/2022    Knee films: Demonstrate well positioned knee arthroplasty components in satisfactory alignment without evidence of wear, loosening, subsidence, fracture, or osteolysis and No significant changes compared to prior radiographs.    Assessment/Plan:   Diagnosis Plan   1. Status post total left knee replacement  XR Knee 3+ View With [place] Left      2. Lymphedema of left lower extremity          Patient doing well a 9-month status post left total knee arthroplasty.  I recommend activity as tolerated without restrictions.  Residual swelling in the ankle is due to lymphedema.  I explained that this can be treated with compression.  In regards to her knee, I see no radiographic or clinical complications.  I will see the patient back in 4 months for 1 year anniversary with x-ray 3 views left knee on return.  She is welcome follow-up sooner should problems arise.    Patient has evidence of lymphedema involving the lower extremity/extremities.  I recommended compression stockings to improve this condition.  A list of local retail stores where the compression stockings can be purchased were provided.  Patient was instructed to purchase compression stockings and wear whenever the lower extremities are in a dependent position.      Cayetano King MD  05/30/23  11:36 EDT

## 2023-09-29 ENCOUNTER — OFFICE VISIT (OUTPATIENT)
Dept: ORTHOPEDIC SURGERY | Facility: CLINIC | Age: 48
End: 2023-09-29
Payer: COMMERCIAL

## 2023-09-29 VITALS
SYSTOLIC BLOOD PRESSURE: 136 MMHG | WEIGHT: 214.4 LBS | HEIGHT: 64 IN | BODY MASS INDEX: 36.6 KG/M2 | DIASTOLIC BLOOD PRESSURE: 78 MMHG

## 2023-09-29 DIAGNOSIS — Z96.652 STATUS POST TOTAL LEFT KNEE REPLACEMENT: Primary | ICD-10-CM

## 2023-09-29 RX ORDER — NAPROXEN 500 MG/1
TABLET ORAL
COMMUNITY
Start: 2023-09-09

## 2023-09-29 RX ORDER — LIDOCAINE 50 MG/G
PATCH TOPICAL
COMMUNITY
Start: 2023-09-09

## 2023-09-29 NOTE — PROGRESS NOTES
Orthopaedic Clinic Note: Knee Established Patient    Chief Complaint   Patient presents with    Follow-up     4 month f/u; 1 year status post LEFT TOTAL KNEE ARTHROPLASTY 22        HPI    It has been 4  month(s) since Ms. Yost's last visit. She returns to clinic today for follow-up left total knee arthroplasty.  She is 1 year out from surgery.  Rates her pain 0/10 on the pain scale today.  She is ambulating with no assistive device.  Denies fevers chills or constitutional symptoms.  Overall she is happy with her outcome.    Past Medical History:   Diagnosis Date    Anemia     Anesthesia complication     per pt, she stopped breathing on table during cyst removal and required resucitation    Anxiety     Arthritis     Asthma     Depression     GERD (gastroesophageal reflux disease)     Heart murmur     Migraine     Sleep apnea     self-diagnosed, has not had study to confirm      Past Surgical History:   Procedure Laterality Date    CYST REMOVAL      TOTAL KNEE ARTHROPLASTY Left 2022    Procedure: TOTAL KNEE ARTHROPLASTY LEFT;  Surgeon: Cayetano King MD;  Location: [facility];  Service: Orthopedics;  Laterality: Left;    VAGINAL DELIVERY      x5      Family History   Problem Relation Age of Onset    Cancer Mother     Diabetes Mother     No Known Problems Father      Social History     Socioeconomic History    Marital status:    Tobacco Use    Smoking status: Former     Packs/day: 0.50     Types: Cigarettes     Quit date: 2022     Years since quittin.3    Smokeless tobacco: Never   Vaping Use    Vaping Use: Never used   Substance and Sexual Activity    Alcohol use: Yes     Comment: OCCASIONALLY    Drug use: No    Sexual activity: Defer      Current Outpatient Medications on File Prior to Visit   Medication Sig Dispense Refill    albuterol sulfate  (90 Base) MCG/ACT inhaler Inhale 2 puffs Every 6 (Six) Hours As Needed for Shortness of Air or Wheezing.      aspirin 81 MG EC  "tablet Take 1 tablet by mouth 2 (Two) Times a Day. 60 tablet 0    lidocaine (LIDODERM) 5 % PLACE 1 PATCH ONTO SKIN EVERY DAY AS NEEDED FOR UP TO 5 DAYS. REMOVE AND DISCARD WITHIN 12 HOURS OR AS DIRECTED BY MD      naproxen (NAPROSYN) 500 MG tablet       naproxen sodium (ALEVE) 220 MG tablet Take 1 tablet by mouth As Needed.       No current facility-administered medications on file prior to visit.      Allergies   Allergen Reactions    Penicillins Other (See Comments)     \"MY WHOLE FAMILY IS ALLERGIC AND I DONT WANT TO TAKE A CHANCE\"         Review of Systems   Constitutional:  Negative for activity change, appetite change, chills, diaphoresis, fatigue, fever and unexpected weight change.   HENT:  Negative for congestion, dental problem, drooling, ear discharge, ear pain, facial swelling, hearing loss, mouth sores, nosebleeds, postnasal drip, rhinorrhea, sinus pressure, sneezing, sore throat, tinnitus, trouble swallowing and voice change.    Eyes:  Negative for photophobia, pain, discharge, redness, itching and visual disturbance.   Respiratory:  Negative for apnea, cough, choking, chest tightness, shortness of breath, wheezing and stridor.    Cardiovascular:  Negative for chest pain, palpitations and leg swelling.   Gastrointestinal:  Negative for abdominal distention, abdominal pain, anal bleeding, blood in stool, constipation, diarrhea, nausea, rectal pain and vomiting.   Endocrine: Negative for cold intolerance, heat intolerance, polydipsia, polyphagia and polyuria.   Genitourinary:  Negative for decreased urine volume, difficulty urinating, dysuria, enuresis, flank pain, frequency, genital sores, hematuria and urgency.   Musculoskeletal:  Positive for arthralgias. Negative for back pain, gait problem, joint swelling, myalgias, neck pain and neck stiffness.   Skin:  Negative for color change, pallor, rash and wound.   Allergic/Immunologic: Negative for environmental allergies, food allergies and " "immunocompromised state.   Neurological:  Negative for dizziness, tremors, seizures, syncope, facial asymmetry, speech difficulty, weakness, light-headedness, numbness and headaches.   Hematological:  Negative for adenopathy. Does not bruise/bleed easily.   Psychiatric/Behavioral:  Negative for agitation, behavioral problems, confusion, decreased concentration, dysphoric mood, hallucinations, self-injury, sleep disturbance and suicidal ideas. The patient is not nervous/anxious and is not hyperactive.       The patient's Review of Systems was personally reviewed and confirmed as accurate.    Physical Exam  Blood pressure 136/78, height 162.6 cm (64\"), weight 97.3 kg (214 lb 6.4 oz).    Body mass index is 36.8 kg/m².    GENERAL APPEARANCE: awake, alert, oriented, in no acute distress and well developed, well nourished  LUNGS:  breathing nonlabored  EXTREMITIES: no clubbing, cyanosis  PERIPHERAL PULSES: palpable dorsalis pedis and posterior tibial pulses bilaterally.    GAIT:  Normal        ----------  Left Knee Exam:  ----------  ALIGNMENT: neutral  ----------  RANGE OF MOTION:  Normal (0-120 degrees) with no extensor lag or flexion contracture  LIGAMENTOUS STABILITY:   stable to varus and valgus stress at terminal extension and 30 degrees without any evidence of laxity  ----------  STRENGTH:  KNEE FLEXION 5/5  KNEE EXTENSION  5/5  ANKLE DORSIFLEXION  5/5  ANKLE PLANTARFLEXION  5/5  ----------  PAIN WITH PALPATION:denies tenderness to palpation about the knee  KNEE EFFUSION: no  PAIN WITH KNEE ROM: no  PATELLAR CREPITUS:  no  ----------  SENSATION TO LIGHT TOUCH:  DEEP PERONEAL/SUPERFICIAL PERONEAL/SURAL/SAPHENOUS/TIBIAL:    intact  ----------  EDEMA: 1+ edema in ankle  ERYTHEMA:    no  WOUNDS/INCISIONS:   yes, well healed surgical incision without evidence of erythema or " drainage  _____________________________________________________________________  _____________________________________________________________________    RADIOGRAPHIC FINDINGS:   Indication: Status post left total knee arthroplasty    Comparison: Todays xrays were compared to previous xrays from 5/30/2023    Knee films: Demonstrate well positioned knee arthroplasty components in satisfactory alignment without evidence of wear, loosening, subsidence, fracture, or osteolysis and No significant changes compared to prior radiographs.    Assessment/Plan:   Diagnosis Plan   1. Status post total left knee replacement  XR Knee 3+ View With [place] Left        Patient is doing well 1 year status post left total knee arthroplasty.  I recommend activity as tolerated without restrictions.  I will see the patient back in 5 years for repeat assessment x-ray 3 views left knee on return.  Patient is welcome follow-up sooner should problems arise.    I recommend prophylactic antibiotics prior to invasive procedures indefinitely to minimize risk of prosthetic joint infection.  Amoxicillin 2 g orally 1 hour prior to procedure is recommended.        Cayetano King MD  09/29/23  10:00 EDT

## 2024-10-11 NOTE — TELEPHONE ENCOUNTER
CALLED TO SCHEDULE INJECTIONS ON A M,W, OR F. PATIENT STATED THAT SHE WAS AT WORK AND WILL CALL LATER TO SCHEDULE.  
[FreeTextEntry1] : Status post upper respiratory tract infection symptoms resolved. Increase in shortness of breath off bronchodilator therapy. Elevated left hemidiaphragm with diaphragmatic dysfunction but no paradoxical motion. Component of airflow limitation.  Decrease in FEV1 off of bronchodilator therapy. Pulmonary nodule.  Low risk patient small.

## 2025-03-07 ENCOUNTER — TELEPHONE (OUTPATIENT)
Age: 50
End: 2025-03-07

## 2025-03-07 ENCOUNTER — OFFICE VISIT (OUTPATIENT)
Age: 50
End: 2025-03-07
Payer: COMMERCIAL

## 2025-03-07 ENCOUNTER — LAB (OUTPATIENT)
Age: 50
End: 2025-03-07
Payer: COMMERCIAL

## 2025-03-07 VITALS
OXYGEN SATURATION: 98 % | WEIGHT: 240.2 LBS | BODY MASS INDEX: 41.23 KG/M2 | SYSTOLIC BLOOD PRESSURE: 132 MMHG | DIASTOLIC BLOOD PRESSURE: 84 MMHG | HEART RATE: 70 BPM

## 2025-03-07 DIAGNOSIS — D64.9 ANEMIA, UNSPECIFIED TYPE: ICD-10-CM

## 2025-03-07 DIAGNOSIS — E78.2 MIXED HYPERLIPIDEMIA: ICD-10-CM

## 2025-03-07 DIAGNOSIS — E66.01 CLASS 3 SEVERE OBESITY DUE TO EXCESS CALORIES WITH SERIOUS COMORBIDITY AND BODY MASS INDEX (BMI) OF 40.0 TO 44.9 IN ADULT: ICD-10-CM

## 2025-03-07 DIAGNOSIS — J45.20 MILD INTERMITTENT ASTHMA WITHOUT COMPLICATION: ICD-10-CM

## 2025-03-07 DIAGNOSIS — Z79.899 CONTROLLED SUBSTANCE AGREEMENT SIGNED: Primary | ICD-10-CM

## 2025-03-07 DIAGNOSIS — K20.0 EOSINOPHILIC ESOPHAGITIS: ICD-10-CM

## 2025-03-07 DIAGNOSIS — Z13.1 SCREENING FOR DIABETES MELLITUS: ICD-10-CM

## 2025-03-07 DIAGNOSIS — E66.813 CLASS 3 SEVERE OBESITY DUE TO EXCESS CALORIES WITH SERIOUS COMORBIDITY AND BODY MASS INDEX (BMI) OF 40.0 TO 44.9 IN ADULT: ICD-10-CM

## 2025-03-07 LAB
BASOPHILS # BLD AUTO: 0.06 10*3/MM3 (ref 0–0.2)
BASOPHILS NFR BLD AUTO: 0.5 % (ref 0–1.5)
DEPRECATED RDW RBC AUTO: 40.4 FL (ref 37–54)
EOSINOPHIL # BLD AUTO: 0.45 10*3/MM3 (ref 0–0.4)
EOSINOPHIL NFR BLD AUTO: 4.1 % (ref 0.3–6.2)
ERYTHROCYTE [DISTWIDTH] IN BLOOD BY AUTOMATED COUNT: 14.5 % (ref 12.3–15.4)
HCT VFR BLD AUTO: 36.5 % (ref 34–46.6)
HGB BLD-MCNC: 11.3 G/DL (ref 12–15.9)
IMM GRANULOCYTES # BLD AUTO: 0.05 10*3/MM3 (ref 0–0.05)
IMM GRANULOCYTES NFR BLD AUTO: 0.5 % (ref 0–0.5)
LYMPHOCYTES # BLD AUTO: 3.16 10*3/MM3 (ref 0.7–3.1)
LYMPHOCYTES NFR BLD AUTO: 28.5 % (ref 19.6–45.3)
MCH RBC QN AUTO: 24.1 PG (ref 26.6–33)
MCHC RBC AUTO-ENTMCNC: 31 G/DL (ref 31.5–35.7)
MCV RBC AUTO: 77.8 FL (ref 79–97)
MONOCYTES # BLD AUTO: 0.88 10*3/MM3 (ref 0.1–0.9)
MONOCYTES NFR BLD AUTO: 7.9 % (ref 5–12)
NEUTROPHILS NFR BLD AUTO: 58.5 % (ref 42.7–76)
NEUTROPHILS NFR BLD AUTO: 6.5 10*3/MM3 (ref 1.7–7)
NRBC BLD AUTO-RTO: 0 /100 WBC (ref 0–0.2)
PLATELET # BLD AUTO: 313 10*3/MM3 (ref 140–450)
PMV BLD AUTO: 10.3 FL (ref 6–12)
RBC # BLD AUTO: 4.69 10*6/MM3 (ref 3.77–5.28)
WBC NRBC COR # BLD AUTO: 11.1 10*3/MM3 (ref 3.4–10.8)

## 2025-03-07 PROCEDURE — 80061 LIPID PANEL: CPT | Performed by: STUDENT IN AN ORGANIZED HEALTH CARE EDUCATION/TRAINING PROGRAM

## 2025-03-07 PROCEDURE — 84466 ASSAY OF TRANSFERRIN: CPT | Performed by: STUDENT IN AN ORGANIZED HEALTH CARE EDUCATION/TRAINING PROGRAM

## 2025-03-07 PROCEDURE — 83036 HEMOGLOBIN GLYCOSYLATED A1C: CPT | Performed by: STUDENT IN AN ORGANIZED HEALTH CARE EDUCATION/TRAINING PROGRAM

## 2025-03-07 PROCEDURE — 80050 GENERAL HEALTH PANEL: CPT | Performed by: STUDENT IN AN ORGANIZED HEALTH CARE EDUCATION/TRAINING PROGRAM

## 2025-03-07 PROCEDURE — 36415 COLL VENOUS BLD VENIPUNCTURE: CPT | Performed by: STUDENT IN AN ORGANIZED HEALTH CARE EDUCATION/TRAINING PROGRAM

## 2025-03-07 PROCEDURE — 82728 ASSAY OF FERRITIN: CPT | Performed by: STUDENT IN AN ORGANIZED HEALTH CARE EDUCATION/TRAINING PROGRAM

## 2025-03-07 PROCEDURE — 83540 ASSAY OF IRON: CPT | Performed by: STUDENT IN AN ORGANIZED HEALTH CARE EDUCATION/TRAINING PROGRAM

## 2025-03-07 RX ORDER — IBUPROFEN 600 MG/1
600 TABLET, FILM COATED ORAL AS NEEDED
COMMUNITY
Start: 2025-02-10

## 2025-03-07 RX ORDER — ALBUTEROL SULFATE 90 UG/1
2 INHALANT RESPIRATORY (INHALATION) EVERY 6 HOURS PRN
Qty: 18 G | Refills: 11 | Status: SHIPPED | OUTPATIENT
Start: 2025-03-07

## 2025-03-07 NOTE — PROGRESS NOTES
Office Note     Name: Sully Yost    : 1975     MRN: [id number]     Chief Complaint  Establish Care    Subjective     History of Present Illness:  Sully Yost is a 50 y.o. female who presents today for initial visit to establish care.  She has not seen a primary care doctor in several years.  She does have multiple chronic issues for which she follows with specialist or goes to the ER.  She has eosinophilic esophagitis which is being treated as GERD with just a PPI and managing symptoms fairly well.  She also has asthma which is just on a rescue inhaler.  She takes an aspirin every now and then but this is not for prevention (primary or secondary) of cardiac issues.  She has gone through menopause and is struggling with weight gain.    Past Medical History:   Past Medical History:   Diagnosis Date    Anemia     Anesthesia complication     per pt, she stopped breathing on table during cyst removal and required resucitation    Anxiety     Arthritis     Asthma     Depression     GERD (gastroesophageal reflux disease)     Heart murmur     Migraine     Sleep apnea     self-diagnosed, has not had study to confirm       Past Surgical History:   Past Surgical History:   Procedure Laterality Date    CYST REMOVAL      TOTAL KNEE ARTHROPLASTY Left 2022    Procedure: TOTAL KNEE ARTHROPLASTY LEFT;  Surgeon: Cayetano King MD;  Location: [facility];  Service: Orthopedics;  Laterality: Left;    VAGINAL DELIVERY      x5       Immunizations:   Immunization History   Administered Date(s) Administered    COVID-19 (PFIZER) Purple Cap Monovalent 2021, 2021    Hepatitis B Adult/Adolescent IM 2015    Influenza, Unspecified 2015    Tdap 2015        Medications:     Current Outpatient Medications:     albuterol sulfate  (90 Base) MCG/ACT inhaler, Inhale 2 puffs Every 6 (Six) Hours As Needed for Shortness of Air or Wheezing., Disp: 18 g, Rfl: 11    aspirin 81 MG EC  "tablet, Take 1 tablet by mouth 2 (Two) Times a Day. (Patient taking differently: Take 1 tablet by mouth 2 (Two) Times a Day.), Disp: 60 tablet, Rfl: 0    ibuprofen (ADVIL,MOTRIN) 600 MG tablet, Take 1 tablet by mouth As Needed. Take 1 tablet by mouth every 6-8 hours as needed for pain, Disp: , Rfl:     naproxen sodium (ALEVE) 220 MG tablet, Take 1 tablet by mouth As Needed., Disp: , Rfl:     lidocaine (LIDODERM) 5 %, PLACE 1 PATCH ONTO SKIN EVERY DAY AS NEEDED FOR UP TO 5 DAYS. REMOVE AND DISCARD WITHIN 12 HOURS OR AS DIRECTED BY MD (Patient not taking: Reported on 3/7/2025), Disp: , Rfl:     naproxen (NAPROSYN) 500 MG tablet, , Disp: , Rfl:     Allergies:   Allergies   Allergen Reactions    Penicillins Other (See Comments)     \"MY WHOLE FAMILY IS ALLERGIC AND I DONT WANT TO TAKE A CHANCE\"        Family History:   Family History   Problem Relation Age of Onset    Cancer Mother     Diabetes Mother     Cancer Father     Cancer Sister     ADD / ADHD Daughter     Heart attack Son     Heart defect Son        Social History:   Social History     Socioeconomic History    Marital status:    Tobacco Use    Smoking status: Former     Current packs/day: 0.00     Types: Cigarettes     Quit date: 2022     Years since quittin.7    Smokeless tobacco: Never   Vaping Use    Vaping status: Never Used   Substance and Sexual Activity    Alcohol use: Yes     Comment: OCCASIONALLY    Drug use: No    Sexual activity: Defer         Objective     Vital Signs  /84 (BP Location: Left arm, Patient Position: Sitting, Cuff Size: Adult)   Pulse 70   Wt 109 kg (240 lb 3.2 oz)   SpO2 98%   BMI 41.23 kg/m²   Estimated body mass index is 41.23 kg/m² as calculated from the following:    Height as of 23: 162.6 cm (64\").    Weight as of this encounter: 109 kg (240 lb 3.2 oz).    Class 3 Severe Obesity (BMI >=40). Obesity-related health conditions include the following: GERD. Obesity is newly identified. BMI is is above " average; BMI management plan is completed. We discussed portion control, increasing exercise, and an fabrice-based approach such as MyFitness Pal or Lose It.      Physical Exam  Constitutional:       General: She is not in acute distress.     Appearance: She is not toxic-appearing.   Cardiovascular:      Rate and Rhythm: Normal rate and regular rhythm.      Heart sounds: No murmur heard.     No friction rub. No gallop.   Pulmonary:      Effort: Pulmonary effort is normal.      Breath sounds: Normal breath sounds.   Abdominal:      General: Abdomen is flat. There is no distension.   Skin:     General: Skin is warm and dry.   Neurological:      Mental Status: She is alert.   Psychiatric:         Mood and Affect: Mood normal.         Behavior: Behavior normal.          Assessment and Plan     1. Controlled substance agreement signed  CSA and UDS for phentermine  - ToxAssure Flex 23, Ur -; Future  - ToxAssure Flex 23, Ur -    2. Eosinophilic esophagitis  Chronic uncontrolled continue pantoprazole. She does have asthma as well so could consider injectable therapy in the future   - CBC Auto Differential; Future  - Comprehensive Metabolic Panel; Future  - TSH Rfx On Abnormal To Free T4; Future    3. Mixed hyperlipidemia  Check lipids  - Lipid Panel; Future    4. Screening for diabetes mellitus  Check A1c  - Hemoglobin A1c; Future    5. Class 3 severe obesity due to excess calories with serious comorbidity and body mass index (BMI) of 40.0 to 44.9 in adult  Chronic uncontrolled  Discussed diet, refer to nutrition for  follow up on this  Will do short course of phentermine after UDS  - Ambulatory Referral to Nutrition Services    6. Mild intermittent asthma without complication  Chronic stable continue albuterol  - albuterol sulfate  (90 Base) MCG/ACT inhaler; Inhale 2 puffs Every 6 (Six) Hours As Needed for Shortness of Air or Wheezing.  Dispense: 18 g; Refill: 11       Counseling was given to patient for the following  topics: instructions for management.    Follow Up  Return in about 3 months (around 6/7/2025) for Annual physical.    MD JOSH DuarteE PC [facility]  [address] [address]  [phone]

## 2025-03-08 LAB
ALBUMIN SERPL-MCNC: 4.2 G/DL (ref 3.5–5.2)
ALBUMIN/GLOB SERPL: 1.4 G/DL
ALP SERPL-CCNC: 98 U/L (ref 39–117)
ALT SERPL W P-5'-P-CCNC: 5 U/L (ref 1–33)
ANION GAP SERPL CALCULATED.3IONS-SCNC: 10.2 MMOL/L (ref 5–15)
AST SERPL-CCNC: 16 U/L (ref 1–32)
BILIRUB SERPL-MCNC: <0.2 MG/DL (ref 0–1.2)
BUN SERPL-MCNC: 9 MG/DL (ref 6–20)
BUN/CREAT SERPL: 12.3 (ref 7–25)
CALCIUM SPEC-SCNC: 9.2 MG/DL (ref 8.6–10.5)
CHLORIDE SERPL-SCNC: 108 MMOL/L (ref 98–107)
CHOLEST SERPL-MCNC: 153 MG/DL (ref 0–200)
CO2 SERPL-SCNC: 26.8 MMOL/L (ref 22–29)
CREAT SERPL-MCNC: 0.73 MG/DL (ref 0.57–1)
EGFRCR SERPLBLD CKD-EPI 2021: 99.1 ML/MIN/1.73
GLOBULIN UR ELPH-MCNC: 2.9 GM/DL
GLUCOSE SERPL-MCNC: 82 MG/DL (ref 65–99)
HBA1C MFR BLD: 6.1 % (ref 4.8–5.6)
HDLC SERPL-MCNC: 58 MG/DL (ref 40–60)
LDLC SERPL CALC-MCNC: 83 MG/DL (ref 0–100)
LDLC/HDLC SERPL: 1.44 {RATIO}
POTASSIUM SERPL-SCNC: 4.2 MMOL/L (ref 3.5–5.2)
PROT SERPL-MCNC: 7.1 G/DL (ref 6–8.5)
SODIUM SERPL-SCNC: 145 MMOL/L (ref 136–145)
TRIGL SERPL-MCNC: 58 MG/DL (ref 0–150)
TSH SERPL DL<=0.05 MIU/L-ACNC: 3.19 UIU/ML (ref 0.27–4.2)
VLDLC SERPL-MCNC: 12 MG/DL (ref 5–40)

## 2025-03-10 ENCOUNTER — RESULTS FOLLOW-UP (OUTPATIENT)
Age: 50
End: 2025-03-10
Payer: COMMERCIAL

## 2025-03-10 DIAGNOSIS — D64.9 ANEMIA, UNSPECIFIED TYPE: Primary | ICD-10-CM

## 2025-03-10 DIAGNOSIS — E66.813 CLASS 3 SEVERE OBESITY DUE TO EXCESS CALORIES WITH SERIOUS COMORBIDITY AND BODY MASS INDEX (BMI) OF 40.0 TO 44.9 IN ADULT: ICD-10-CM

## 2025-03-10 DIAGNOSIS — E66.01 CLASS 3 SEVERE OBESITY DUE TO EXCESS CALORIES WITH SERIOUS COMORBIDITY AND BODY MASS INDEX (BMI) OF 40.0 TO 44.9 IN ADULT: ICD-10-CM

## 2025-03-10 LAB
FERRITIN SERPL-MCNC: 71.5 NG/ML (ref 13–150)
IRON 24H UR-MRATE: 35 MCG/DL (ref 37–145)
IRON SATN MFR SERPL: 8 % (ref 20–50)
TIBC SERPL-MCNC: 426 MCG/DL (ref 298–536)
TRANSFERRIN SERPL-MCNC: 286 MG/DL (ref 200–360)

## 2025-03-10 RX ORDER — FERROUS SULFATE 324(65)MG
324 TABLET, DELAYED RELEASE (ENTERIC COATED) ORAL
Qty: 30 TABLET | Refills: 2 | Status: SHIPPED | OUTPATIENT
Start: 2025-03-10

## 2025-03-10 NOTE — TELEPHONE ENCOUNTER
Spoke with patient informed we are waiting on UDS results before medication can be sent to pharmacy   Pt verbalized understanding

## 2025-03-11 ENCOUNTER — TELEPHONE (OUTPATIENT)
Age: 50
End: 2025-03-11
Payer: COMMERCIAL

## 2025-03-11 LAB
1OH-MIDAZOLAM UR QL SCN: NOT DETECTED NG/MG CREAT
6MAM UR QL SCN: NEGATIVE NG/ML
7AMINOCLONAZEPAM/CREAT UR: NOT DETECTED NG/MG CREAT
A-OH ALPRAZ/CREAT UR: NOT DETECTED NG/MG CREAT
A-OH-TRIAZOLAM/CREAT UR CFM: NOT DETECTED NG/MG CREAT
ACP UR QL CFM: NOT DETECTED
ALPRAZ/CREAT UR CFM: NOT DETECTED NG/MG CREAT
AMPHETAMINES UR QL SCN: NEGATIVE NG/ML
APAP UR QL SCN: NEGATIVE UG/ML
BARBITURATES UR QL SCN: NEGATIVE NG/ML
BENZODIAZ SCN METH UR: NEGATIVE
BUPRENORPHINE UR QL SCN: NEGATIVE
BUPRENORPHINE/CREAT UR: NOT DETECTED NG/MG CREAT
CANNABINOIDS UR QL SCN: NEGATIVE NG/ML
CARISOPRODOL UR QL: NEGATIVE NG/ML
CLONAZEPAM/CREAT UR CFM: NOT DETECTED NG/MG CREAT
COCAINE+BZE UR QL SCN: NEGATIVE NG/ML
CREAT UR-MCNC: 186 MG/DL
D-METHORPHAN UR-MCNC: NOT DETECTED NG/ML
D-METHORPHAN+LEVORPHANOL UR QL: NOT DETECTED
DESALKYLFLURAZ/CREAT UR: NOT DETECTED NG/MG CREAT
DIAZEPAM/CREAT UR: NOT DETECTED NG/MG CREAT
ETHANOL UR QL SCN: NEGATIVE G/DL
ETHANOL UR QL SCN: NEGATIVE NG/ML
FENTANYL CTO UR SCN-MCNC: NEGATIVE NG/ML
FENTANYL/CREAT UR: NOT DETECTED NG/MG CREAT
FLUNITRAZEPAM UR QL SCN: NOT DETECTED NG/MG CREAT
GABAPENTIN UR-MCNC: NEGATIVE UG/ML
HALLUCINOGENS UR: NEGATIVE
HYPNOTICS UR QL SCN: NEGATIVE
KETAMINE UR QL: NOT DETECTED
LORAZEPAM/CREAT UR: NOT DETECTED NG/MG CREAT
MEPERIDINE UR QL SCN: NEGATIVE NG/ML
METHADONE UR QL SCN: NEGATIVE NG/ML
METHADONE+METAB UR QL SCN: NEGATIVE NG/ML
MIDAZOLAM/CREAT UR CFM: NOT DETECTED NG/MG CREAT
MISCELLANEOUS, UR: NEGATIVE
NORBUPRENORPHINE/CREAT UR: NOT DETECTED NG/MG CREAT
NORDIAZEPAM/CREAT UR: NOT DETECTED NG/MG CREAT
NORFENTANYL/CREAT UR: NOT DETECTED NG/MG CREAT
NORFLUNITRAZEPAM UR-MCNC: NOT DETECTED NG/MG CREAT
NORKETAMINE UR-MCNC: NOT DETECTED UG/ML
OPIATES UR SCN-MCNC: NEGATIVE NG/ML
OXAZEPAM/CREAT UR: NOT DETECTED NG/MG CREAT
OXYCODONE CTO UR SCN-MCNC: NEGATIVE NG/ML
PCP UR QL SCN: NEGATIVE NG/ML
PRESCRIBED MEDICATIONS: NORMAL
PROPOXYPH UR QL SCN: NEGATIVE NG/ML
TAPENTADOL CTO UR SCN-MCNC: NEGATIVE NG/ML
TEMAZEPAM/CREAT UR: NOT DETECTED NG/MG CREAT
TRAMADOL UR QL SCN: NEGATIVE NG/ML
ZALEPLON UR-MCNC: NOT DETECTED NG/ML
ZOLPIDEM PHENYL-4-CARB UR QL SCN: NOT DETECTED
ZOLPIDEM UR QL SCN: NOT DETECTED
ZOPICLONE-N-OXIDE UR-MCNC: NOT DETECTED NG/ML

## 2025-03-11 NOTE — TELEPHONE ENCOUNTER
Name: Priyank Sully Trinidad    Relationship: Self    Best Callback Number:      [phone] (Mobile)     HUB PROVIDED THE RELAY MESSAGE FROM THE OFFICE   PATIENT VOICED UNDERSTANDING AND HAS NO FURTHER QUESTIONS AT THIS TIME    ADDITIONAL INFORMATION:

## 2025-03-11 NOTE — TELEPHONE ENCOUNTER
Lvm    Relay    The urine drug screen can sometimes take up to a week as it is processed through [facility]. We are unable to send in her phentermine until we have a negative uds because it is a controlled substance. Weight loss medications do not work immediately and are never an emergency medication and they do not work immediately so we can't go outside of MELVA requirements on the phentermine. d

## 2025-03-11 NOTE — TELEPHONE ENCOUNTER
Caller: Sully Yost    Relationship: Self    Best call back number: [phone]     What is the best time to reach you: ANYTIME    Who are you requesting to speak with (clinical staff, provider,  specific staff member): CLINICAL    Do you know the name of the person who called: PATIENT    What was the call regarding: PATIENT STATES SHE HAS GAINED MORE WEIGHT SINCE SEEING DR SHARMA ON 3.7.25 AND IS CONCERNED.  DUE TO THIS, SHE ONLY WANTS PCP TO RETURN HER CALL.  SHE WOULD ALSO LIKE TO KNOW WHY THERE IS A DELAY IN GETTING THE URINALYSIS RESULTS BACK.    Is it okay if the provider responds through MyChart: NO

## 2025-03-12 ENCOUNTER — TELEPHONE (OUTPATIENT)
Age: 50
End: 2025-03-12
Payer: COMMERCIAL

## 2025-03-12 NOTE — TELEPHONE ENCOUNTER
Caller: Sully Yost    Relationship: Self    Best call back number: [phone]     Caller requesting test results: SELF    What test was performed: URINE TEST    When was the test performed: 3/7/25        Additional notes: PATIENT REQUESTS A CALLBACK TO DISCUSS THE RESULTS OF HER URINALYSIS.

## 2025-03-13 RX ORDER — PHENTERMINE HYDROCHLORIDE 15 MG/1
15 CAPSULE ORAL EVERY MORNING
Qty: 30 CAPSULE | Refills: 0 | Status: SHIPPED | OUTPATIENT
Start: 2025-03-13

## 2025-03-24 ENCOUNTER — TELEPHONE (OUTPATIENT)
Age: 50
End: 2025-03-24
Payer: COMMERCIAL

## 2025-03-24 NOTE — TELEPHONE ENCOUNTER
PATIENT REQUESTS CALL BACK TO DISCUSS TAKING PHENTERMINE. THE DIRECTIONS SAY TO TAKE EARLY IN THE MORNING. 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 DIRECTIONS ALSO SAY CAN CAUSE DROWSINESS. IS IT OKAY TO TAKE EVENING OR AFTERNOON INSTEAD OF MORNING?

## 2025-03-25 ENCOUNTER — HOSPITAL ENCOUNTER (OUTPATIENT)
Dept: NUTRITION | Facility: HOSPITAL | Age: 50
Discharge: HOME | End: 2025-03-25
Admitting: STUDENT IN AN ORGANIZED HEALTH CARE EDUCATION/TRAINING PROGRAM
Payer: COMMERCIAL

## 2025-03-25 PROCEDURE — 97802 MEDICAL NUTRITION INDIV IN: CPT

## 2025-03-25 NOTE — CONSULTS
[facility] Nutrition Services          Initial 60 Minute Nutrition Visit    Date: 2025   Patient Name: Sully Yost  : 1975   MRN: [id number]   Referring Provider: Elvis Mills MD    Reason for Visit: Weight management  Visit Format: Phone    Nutrition Assessment       Social History:   Social History     Socioeconomic History    Marital status:    Tobacco Use    Smoking status: Former     Current packs/day: 0.00     Types: Cigarettes     Quit date: 2022     Years since quittin.8    Smokeless tobacco: Never   Vaping Use    Vaping status: Never Used   Substance and Sexual Activity    Alcohol use: Yes     Comment: OCCASIONALLY    Drug use: No    Sexual activity: Defer     Active Problem List:   Patient Active Problem List    Diagnosis     Acute postoperative pain [G89.18]     Acute blood loss anemia, asymptomatic [D62]     Status post total left knee replacement [Z96.652]     Asthma [J45.909]     Primary osteoarthritis of left knee [M17.12]       Current Medications:   Current Outpatient Medications:     albuterol sulfate  (90 Base) MCG/ACT inhaler, Inhale 2 puffs Every 6 (Six) Hours As Needed for Shortness of Air or Wheezing., Disp: 18 g, Rfl: 11    aspirin 81 MG EC tablet, Take 1 tablet by mouth 2 (Two) Times a Day. (Patient taking differently: Take 1 tablet by mouth 2 (Two) Times a Day.), Disp: 60 tablet, Rfl: 0    ferrous sulfate 324 (65 Fe) MG tablet delayed-release EC tablet, Take 1 tablet by mouth Daily With Breakfast., Disp: 30 tablet, Rfl: 2    ibuprofen (ADVIL,MOTRIN) 600 MG tablet, Take 1 tablet by mouth As Needed. Take 1 tablet by mouth every 6-8 hours as needed for pain, Disp: , Rfl:     lidocaine (LIDODERM) 5 %, PLACE 1 PATCH ONTO SKIN EVERY DAY AS NEEDED FOR UP TO 5 DAYS. REMOVE AND DISCARD WITHIN 12 HOURS OR AS DIRECTED BY MD (Patient not taking: Reported on 3/7/2025), Disp: , Rfl:     naproxen (NAPROSYN) 500 MG tablet, , Disp: , Rfl:      "naproxen sodium (ALEVE) 220 MG tablet, Take 1 tablet by mouth As Needed., Disp: , Rfl:     phentermine 15 MG capsule, Take 1 capsule by mouth Every Morning., Disp: 30 capsule, Rfl: 0    Recent Pertinent Labs: HgbA1c 6.1%, low iron    Hunger Vital Sign Food Insecurity Assessment:  Within the past 12 months I/we worried whether our food would run out before I/we got money to buy more: No   Within the past 12 months the food I/we bought just didn't last and I/we didn't have money to get more: No   Use of food assistance programs (WIC, food stamps, food shi) No       Food & Nutrition Related History       Food Allergies: None noted  Food Intolerances: Eggs (okay in foods, not as is)  Food Behavior: Patient states that she \"doesn't eat much\" throughout the day, sometimes goes all day without eating. She skips breakfast or has a pastry + hot cocoa, skips lunch, and sometimes has a snack for dinner. She drinks soda or juice throughout the night.  Nutrition Impact Symptoms: constipation  Gastrointestinal conditions that impact intake or food choices: EoE, GERD  Details at home: Works as  1st shift  Who prepares most meals: patient  Who does grocery shopping: patient  How many meals are purchased from fast food/sit down restaurants per week: 4-5  Difficulty chewin - Normal  Difficulty swallowin - Normal  Diet requirement related to personal preference or cultural belief: No eggs (okay in foods, not as is)  History of eating disorder/disordered eating habits: Patient says she sometimes doesn't eat the entire day, but when prompted, she mentions foods that she snacks on. If patient is not eating all day, it is likely due to schedule and fatigue rather than intentional restriction  Language/communication details: English; phone appointment - no visuals  Barriers to learning: None    24 Hour Recall:   Time Food/beverages consumed   Breakfast Hot chocolate + honey bun   Dinner Chips + juice or soda   Kailyn Juice, " "soda, hot cocoa, occasionally water                         Additional comments: Patient's diet is poor. High in sat fat, added sugars, simple carbs. Low in protein, fiber, healthy fats, water    Anthropometrics      Height:   Ht Readings from Last 1 Encounters:   09/29/23 162.6 cm (64\")     Weight:   Wt Readings from Last 3 Encounters:   03/07/25 109 kg (240 lb 3.2 oz)   09/29/23 97.3 kg (214 lb 6.4 oz)   05/30/23 102 kg (225 lb)     BMI: There is no height or weight on file to calculate BMI.   Weight Change: None     Physical Activity     Barriers to physical activity: Unknown     Physical activity comments: Did not discuss in this appt     Estimated Needs     Estimated Energy Needs: 0858-6118 kcal (MSJ x 1.2 - 500)    Estimated Protein Needs: 80-100g     Estimated Fluid Needs: At least 64 oz/day     Discussion / Education      Sully Yost is a 50 y.o. female who has been referred for weight management. Her primary motivation is to improve confidence, reduce risk of chronic disease/long term complications, improve energy levels, and reduce need for medications. Her primary barriers to change are portion control, time management, convenience, lack of cooking skill/resources, snacking, emotional eating, overcoming cravings, lack of nutrition knowledge, and consistency. Cooking and grocery shopping are done by patient. She dines out 4-5 times per week. She has not previously seen a dietitian/nutritionist.     RD provided education about the three macronutrients and the roles of each for promoting good health and balance. RD emphasized the importance of incorporating a variety of sources for each, as well as increasing fiber rich choices such as whole grains and vegetables/fruits to improve blood sugar, cholesterol, and satiety.  RD also discussed the importance of eating a protein and fiber rich breakfast to improve energy levels and blood glucose throughout the rest of the day. Patient's HgbA1c of 6.1% " places her in the prediabetic range. Her diet is currently a major risk factor for worsening blood sugar control and diabetes risk. Patient may benefit from preDM education if no improvement seen. RD focused on discussing breakfast and beverage choices with patient. She was reasonably confident in her goals. She did request a meal plan, and RD encouraged her to review handouts and ask for support if needed for more meal ideas.    RD and patient worked to set several goals for patient. RD provided contact info and encouraged patient to reach out with any additional questions or concerns following the appointment.    Assessment of patient engagement: Engaged    Measurement of understanding: Patient verbalized understanding    Resources Provided:  Weight management packet  Lunch boxes  [place] handout    Goal (s)      Goal 1: Pair carbs and protein at all meals and snacks.   Goal 2: Eat a snack during work.   Goal 3: Increase water intake, decrease SSB intake.     Plan of Care     PES Statement:   Inadequate oral intake related to skipping meals and not eating balanced meals as evidenced by dietary recall and interview.     Follow Up Visit      Follow Up not scheduled at this time     Total of 60 minutes spent with patient on nutrition counseling. Education based on Academy of Nutrition and Dietetics guidelines. Patient was provided with RD's contact information. Thank you for this referral.

## 2025-06-03 ENCOUNTER — TELEPHONE (OUTPATIENT)
Age: 50
End: 2025-06-03
Payer: COMMERCIAL

## 2025-06-03 NOTE — TELEPHONE ENCOUNTER
Caller: Sully Yost    Relationship: Self    Best call back number: [phone]     Requested Prescriptions:   PILL FORM OF WEIGHTLOSS MEDICATION     Pharmacy where request should be sent: [id number] - [place], [place] - [address] AT [facility] [address] [facility] [phone]  - [phone]     Last office visit with prescribing clinician: 3/7/2025   Last telemedicine visit with prescribing clinician: Visit date not found   Next office visit with prescribing clinician: 6/9/2025     Additional details provided by patient: PATIENT HAS BEEN OUT FOR A COUPLE OF WEEKS. CAN NOT REMEMBER THE NAME OF IT. THE MEDICATION DID NOT HELP HER LOSE WEIGHT. MAYBE 5 POUNDS AT MOST. PATIENT ASKING FOR CALL BACK TO GIVE LAST RECORDED WEIGHT. FEELS LIKE SHE IS GAINING THE WEIGHT BACK AFTER BEING OFF THE MEDICATION.     Does the patient have less than a 3 day supply:  [x] Yes  OUT FOR A COUPLE OF WEEKS    Would you like a call back once the refill request has been completed: [x] Yes TO GIVE LAST RECORDED WEIGHT    If the office needs to give you a call back, can they leave a voicemail: [x] Yes [] No    Ingrid Ward Rep   06/03/25 12:36 EDT

## 2025-06-09 ENCOUNTER — OFFICE VISIT (OUTPATIENT)
Age: 50
End: 2025-06-09
Payer: COMMERCIAL

## 2025-06-09 ENCOUNTER — LAB (OUTPATIENT)
Age: 50
End: 2025-06-09
Payer: COMMERCIAL

## 2025-06-09 VITALS
WEIGHT: 232.6 LBS | SYSTOLIC BLOOD PRESSURE: 122 MMHG | HEIGHT: 64 IN | DIASTOLIC BLOOD PRESSURE: 80 MMHG | HEART RATE: 70 BPM | OXYGEN SATURATION: 97 % | BODY MASS INDEX: 39.71 KG/M2

## 2025-06-09 DIAGNOSIS — E66.01 CLASS 3 SEVERE OBESITY DUE TO EXCESS CALORIES WITH SERIOUS COMORBIDITY AND BODY MASS INDEX (BMI) OF 40.0 TO 44.9 IN ADULT: ICD-10-CM

## 2025-06-09 DIAGNOSIS — D64.9 ANEMIA, UNSPECIFIED TYPE: ICD-10-CM

## 2025-06-09 DIAGNOSIS — D64.9 ANEMIA, UNSPECIFIED TYPE: Primary | ICD-10-CM

## 2025-06-09 DIAGNOSIS — E66.813 CLASS 3 SEVERE OBESITY DUE TO EXCESS CALORIES WITH SERIOUS COMORBIDITY AND BODY MASS INDEX (BMI) OF 40.0 TO 44.9 IN ADULT: ICD-10-CM

## 2025-06-09 DIAGNOSIS — Z00.00 HEALTHCARE MAINTENANCE: ICD-10-CM

## 2025-06-09 PROCEDURE — 83540 ASSAY OF IRON: CPT | Performed by: STUDENT IN AN ORGANIZED HEALTH CARE EDUCATION/TRAINING PROGRAM

## 2025-06-09 PROCEDURE — 84466 ASSAY OF TRANSFERRIN: CPT | Performed by: STUDENT IN AN ORGANIZED HEALTH CARE EDUCATION/TRAINING PROGRAM

## 2025-06-09 PROCEDURE — 1160F RVW MEDS BY RX/DR IN RCRD: CPT | Performed by: STUDENT IN AN ORGANIZED HEALTH CARE EDUCATION/TRAINING PROGRAM

## 2025-06-09 PROCEDURE — 99396 PREV VISIT EST AGE 40-64: CPT | Performed by: STUDENT IN AN ORGANIZED HEALTH CARE EDUCATION/TRAINING PROGRAM

## 2025-06-09 PROCEDURE — 82728 ASSAY OF FERRITIN: CPT | Performed by: STUDENT IN AN ORGANIZED HEALTH CARE EDUCATION/TRAINING PROGRAM

## 2025-06-09 PROCEDURE — 36415 COLL VENOUS BLD VENIPUNCTURE: CPT | Performed by: STUDENT IN AN ORGANIZED HEALTH CARE EDUCATION/TRAINING PROGRAM

## 2025-06-09 PROCEDURE — 1159F MED LIST DOCD IN RCRD: CPT | Performed by: STUDENT IN AN ORGANIZED HEALTH CARE EDUCATION/TRAINING PROGRAM

## 2025-06-09 RX ORDER — PHENTERMINE HYDROCHLORIDE 30 MG/1
30 CAPSULE ORAL EVERY MORNING
Qty: 90 CAPSULE | Refills: 0 | Status: SHIPPED | OUTPATIENT
Start: 2025-06-09

## 2025-06-09 NOTE — PROGRESS NOTES
Office Note     Name: Sully Yost    : 1975     MRN: [id number]     Chief Complaint  Annual Exam    Subjective     History of Present Illness:  Sully Yost is a 50 y.o. female who presents today for annual health maintenance examination.  She is doing fairly well overall but requests a higher dose of her phentermine.  She has been noticing increased energy with the supplementation of iron.  She has been working very hard on diet and exercise. She has lost about 8 lbs in 3 months. No other concerns at this time.      Past Medical History:   Past Medical History:   Diagnosis Date    Anemia     Anesthesia complication     per pt, she stopped breathing on table during cyst removal and required resucitation    Anxiety     Arthritis     Asthma     Depression     GERD (gastroesophageal reflux disease)     Heart murmur     Migraine     Sleep apnea     self-diagnosed, has not had study to confirm       Past Surgical History:   Past Surgical History:   Procedure Laterality Date    CYST REMOVAL      TOTAL KNEE ARTHROPLASTY Left 2022    Procedure: TOTAL KNEE ARTHROPLASTY LEFT;  Surgeon: Cayetano King MD;  [facility];  Service: Orthopedics;  Laterality: Left;    VAGINAL DELIVERY      x5       Immunizations:   Immunization History   Administered Date(s) Administered    COVID-19 (PFIZER) Purple Cap Monovalent 2021, 2021    Hepatitis B Adult/Adolescent IM 2015    Influenza, Unspecified 2015    Tdap 2015        Medications:     Current Outpatient Medications:     albuterol sulfate  (90 Base) MCG/ACT inhaler, Inhale 2 puffs Every 6 (Six) Hours As Needed for Shortness of Air or Wheezing., Disp: 18 g, Rfl: 11    ferrous sulfate 324 (65 Fe) MG tablet delayed-release EC tablet, Take 1 tablet by mouth Daily With Breakfast., Disp: 30 tablet, Rfl: 2    ibuprofen (ADVIL,MOTRIN) 600 MG tablet, Take 1 tablet by mouth As Needed. Take 1 tablet by mouth every  "6-8 hours as needed for pain, Disp: , Rfl:     aspirin 81 MG EC tablet, Take 1 tablet by mouth 2 (Two) Times a Day. (Patient not taking: Reported on 2025), Disp: 60 tablet, Rfl: 0    lidocaine (LIDODERM) 5 %, PLACE 1 PATCH ONTO SKIN EVERY DAY AS NEEDED FOR UP TO 5 DAYS. REMOVE AND DISCARD WITHIN 12 HOURS OR AS DIRECTED BY MD (Patient not taking: Reported on 2025), Disp: , Rfl:     naproxen (NAPROSYN) 500 MG tablet, , Disp: , Rfl:     naproxen sodium (ALEVE) 220 MG tablet, Take 1 tablet by mouth As Needed. (Patient not taking: Reported on 2025), Disp: , Rfl:     phentermine 30 MG capsule, Take 1 capsule by mouth Every Morning., Disp: 90 capsule, Rfl: 0    Allergies:   Allergies   Allergen Reactions    Penicillins Other (See Comments)     \"MY WHOLE FAMILY IS ALLERGIC AND I DONT WANT TO TAKE A CHANCE\"        Family History:   Family History   Problem Relation Age of Onset    Cancer Mother     Diabetes Mother     Cancer Father     Cancer Sister     ADD / ADHD Daughter     Heart attack Son     Heart defect Son        Social History:   Social History     Socioeconomic History    Marital status:    Tobacco Use    Smoking status: Former     Current packs/day: 0.00     Average packs/day: 0.3 packs/day for 35.9 years (9.0 ttl pk-yrs)     Types: Cigarettes     Start date:      Quit date: 2024     Years since quittin.5    Smokeless tobacco: Never   Vaping Use    Vaping status: Never Used   Substance and Sexual Activity    Alcohol use: Yes     Comment: OCCASIONALLY    Drug use: No    Sexual activity: Not Currently         Objective     Vital Signs  /80 (BP Location: Right arm, Patient Position: Sitting, Cuff Size: Large Adult)   Pulse 70   Ht 162.6 cm (64.02\")   Wt 106 kg (232 lb 9.6 oz)   SpO2 97%   BMI 39.91 kg/m²   Estimated body mass index is 39.91 kg/m² as calculated from the following:    Height as of this encounter: 162.6 cm (64.02\").    Weight as of this encounter: 106 kg (232 lb " 9.6 oz).            Physical Exam  Constitutional:       General: She is not in acute distress.     Appearance: She is not toxic-appearing.   Cardiovascular:      Rate and Rhythm: Normal rate and regular rhythm.      Heart sounds: No murmur heard.     No friction rub. No gallop.   Pulmonary:      Effort: Pulmonary effort is normal.      Breath sounds: Normal breath sounds.   Abdominal:      General: Abdomen is flat. There is no distension.   Skin:     General: Skin is warm and dry.   Neurological:      Mental Status: She is alert.   Psychiatric:         Mood and Affect: Mood normal.         Behavior: Behavior normal.          Assessment and Plan     1. Anemia, unspecified type  Chronic stable  Check labs, continue ferrous sulfate  - Ferritin; Future  - Iron Profile w/o Ferritin; Future    2. Class 3 severe obesity due to excess calories with serious comorbidity and body mass index (BMI) of 40.0 to 44.9 in adult  Chronic stable  Increase phentermine to 30 mg   - phentermine 30 MG capsule; Take 1 capsule by mouth Every Morning.  Dispense: 90 capsule; Refill: 0    3. Healthcare maintenance  #HCM  Cancer screening  -Colon Cancer: Colonoscopy  -Lung cancer: LDCT Not indicated  -Breast cancer: mammogram due 10/25  -Lipids, metabolic panel and A1c Up to date  Depression  -PHQ-2 Depression Screening  Little interest or pleasure in doing things?     Feeling down, depressed, or hopeless?     PHQ-2 Total Score        Infections: NI    Immunizations  Immunization History   Administered Date(s) Administered    COVID-19 (PFIZER) Purple Cap Monovalent 08/01/2021, 08/22/2021    Hepatitis B Adult/Adolescent IM 11/05/2015    Influenza, Unspecified 11/05/2015    Tdap 11/05/2015           Counseling was given to patient for the following topics: instructions for management.    Follow Up  Return in about 3 months (around 9/9/2025) for Follow Up.    Horacio Mills MD  [facility]  [address] [facility]  [place] [address] [address]  [phone]

## 2025-06-10 ENCOUNTER — RESULTS FOLLOW-UP (OUTPATIENT)
Age: 50
End: 2025-06-10
Payer: COMMERCIAL

## 2025-06-10 LAB
FERRITIN SERPL-MCNC: 104 NG/ML (ref 13–150)
IRON 24H UR-MRATE: 57 MCG/DL (ref 37–145)
IRON SATN MFR SERPL: 15 % (ref 20–50)
TIBC SERPL-MCNC: 380 MCG/DL (ref 298–536)
TRANSFERRIN SERPL-MCNC: 255 MG/DL (ref 200–360)

## 2025-06-10 RX ORDER — FERROUS SULFATE 324(65)MG
324 TABLET, DELAYED RELEASE (ENTERIC COATED) ORAL
Qty: 90 TABLET | Refills: 0 | Status: SHIPPED | OUTPATIENT
Start: 2025-06-10

## (undated) DEVICE — BNDG ELAS CO-FLEX SLF ADHR 6IN 5YD LF STRL

## (undated) DEVICE — TBG PENCL TELESCP MEGADYNE SMOKE EVAC 10FT

## (undated) DEVICE — GLV SURG SENSICARE PI ORTHO SZ8 LF STRL

## (undated) DEVICE — PK KN TOTL 10

## (undated) DEVICE — UNDERCAST PADDING: Brand: DEROYAL

## (undated) DEVICE — STRYKER PERFORMANCE SERIES SAGITTAL BLADE: Brand: STRYKER PERFORMANCE SERIES

## (undated) DEVICE — UNDERGLV SURG BIOGEL INDICAT PI SZ8 BLU

## (undated) DEVICE — SYR LUERLOK 30CC

## (undated) DEVICE — PIN HOLD TEMP NOHEAD FLUT 1/8X3.5IN

## (undated) DEVICE — GLV SURG SENSICARE PI MIC PF SZ9 LF STRL

## (undated) DEVICE — NEEDLE, QUINCKE 22GX3.5": Brand: MEDLINE INDUSTRIES, INC.

## (undated) DEVICE — ADHS SKIN PREMIERPRO EXOFIN TOPICAL HI/VISC .5ML

## (undated) DEVICE — TRAP FLD MINIVAC MEGADYNE 100ML

## (undated) DEVICE — BLANKT WARM UPPR/BDY ARM/OUT 57X196CM

## (undated) DEVICE — SUT MONOCRYL PLS ANTIB UND 3/0  PS1 27IN

## (undated) DEVICE — SUT VIC 1 CTX 36IN OBGYN VCP977H

## (undated) DEVICE — PAD ARMBRD SURG CONVOL 7.5X20X2IN

## (undated) DEVICE — ANTIBACTERIAL UNDYED BRAIDED (POLYGLACTIN 910), SYNTHETIC ABSORBABLE SUTURE: Brand: COATED VICRYL

## (undated) DEVICE — TRY EPID SFTY 18G 3.5IN 1T7680

## (undated) DEVICE — TB SXN FRAZIER 12F STRL

## (undated) DEVICE — DRSNG SURG AQUACEL AG/ADVNTGE 9X25CM 3.5X10IN

## (undated) DEVICE — BNDG ELAS W/CLIP 6IN 10YD LF STRL

## (undated) DEVICE — STERILE PVP: Brand: MEDLINE INDUSTRIES, INC.

## (undated) DEVICE — PATIENT RETURN ELECTRODE, SINGLE-USE, CONTACT QUALITY MONITORING, ADULT, WITH 9FT CORD, FOR PATIENTS WEIGING OVER 33LBS. (15KG): Brand: MEGADYNE